# Patient Record
Sex: FEMALE | Race: BLACK OR AFRICAN AMERICAN | Employment: FULL TIME | URBAN - METROPOLITAN AREA
[De-identification: names, ages, dates, MRNs, and addresses within clinical notes are randomized per-mention and may not be internally consistent; named-entity substitution may affect disease eponyms.]

---

## 2017-05-05 ENCOUNTER — APPOINTMENT (OUTPATIENT)
Dept: GENERAL RADIOLOGY | Age: 57
End: 2017-05-05
Attending: EMERGENCY MEDICINE
Payer: SELF-PAY

## 2017-05-05 ENCOUNTER — HOSPITAL ENCOUNTER (EMERGENCY)
Age: 57
Discharge: HOME | End: 2017-05-05
Attending: EMERGENCY MEDICINE
Payer: SELF-PAY

## 2017-05-05 VITALS
BODY MASS INDEX: 26.68 KG/M2 | RESPIRATION RATE: 18 BRPM | TEMPERATURE: 97.9 F | SYSTOLIC BLOOD PRESSURE: 202 MMHG | HEART RATE: 88 BPM | WEIGHT: 170 LBS | OXYGEN SATURATION: 98 % | HEIGHT: 67 IN | DIASTOLIC BLOOD PRESSURE: 86 MMHG

## 2017-05-05 DIAGNOSIS — R60.0 BILATERAL LEG EDEMA: Primary | ICD-10-CM

## 2017-05-05 LAB
ALBUMIN SERPL BCP-MCNC: 1.9 G/DL (ref 3.5–5)
ALBUMIN/GLOB SERPL: 0.3 {RATIO} (ref 1.2–3.5)
ALP SERPL-CCNC: 231 U/L (ref 50–136)
ALT SERPL-CCNC: 55 U/L (ref 12–65)
ANION GAP BLD CALC-SCNC: 12 MMOL/L (ref 7–16)
AST SERPL W P-5'-P-CCNC: 110 U/L (ref 15–37)
BACTERIA URNS QL MICRO: ABNORMAL /HPF
BASOPHILS # BLD AUTO: 0 K/UL (ref 0–0.2)
BASOPHILS # BLD: 1 % (ref 0–2)
BILIRUB SERPL-MCNC: 1.5 MG/DL (ref 0.2–1.1)
BNP SERPL-MCNC: 270 PG/ML
BUN SERPL-MCNC: 14 MG/DL (ref 6–23)
CALCIUM SERPL-MCNC: 8.7 MG/DL (ref 8.3–10.4)
CASTS URNS QL MICRO: 0 /LPF
CHLORIDE SERPL-SCNC: 112 MMOL/L (ref 98–107)
CO2 SERPL-SCNC: 22 MMOL/L (ref 21–32)
CREAT SERPL-MCNC: 1.05 MG/DL (ref 0.6–1)
CRYSTALS URNS QL MICRO: 0 /LPF
DIFFERENTIAL METHOD BLD: ABNORMAL
EOSINOPHIL # BLD: 0.1 K/UL (ref 0–0.8)
EOSINOPHIL NFR BLD: 1 % (ref 0.5–7.8)
EPI CELLS #/AREA URNS HPF: ABNORMAL /HPF
ERYTHROCYTE [DISTWIDTH] IN BLOOD BY AUTOMATED COUNT: 16 % (ref 11.9–14.6)
GLOBULIN SER CALC-MCNC: 6.7 G/DL (ref 2.3–3.5)
GLUCOSE SERPL-MCNC: 110 MG/DL (ref 65–100)
HCT VFR BLD AUTO: 32.3 % (ref 35.8–46.3)
HGB BLD-MCNC: 11.2 G/DL (ref 11.7–15.4)
IMM GRANULOCYTES # BLD: 0 K/UL (ref 0–0.5)
IMM GRANULOCYTES NFR BLD AUTO: 0 % (ref 0–5)
LYMPHOCYTES # BLD AUTO: 42 % (ref 13–44)
LYMPHOCYTES # BLD: 1.8 K/UL (ref 0.5–4.6)
MCH RBC QN AUTO: 30.6 PG (ref 26.1–32.9)
MCHC RBC AUTO-ENTMCNC: 34.7 G/DL (ref 31.4–35)
MCV RBC AUTO: 88.3 FL (ref 79.6–97.8)
MONOCYTES # BLD: 0.4 K/UL (ref 0.1–1.3)
MONOCYTES NFR BLD AUTO: 9 % (ref 4–12)
MUCOUS THREADS URNS QL MICRO: 0 /LPF
NEUTS SEG # BLD: 1.9 K/UL (ref 1.7–8.2)
NEUTS SEG NFR BLD AUTO: 47 % (ref 43–78)
OTHER OBSERVATIONS,UCOM: ABNORMAL
PLATELET # BLD AUTO: 100 K/UL (ref 150–450)
PMV BLD AUTO: 11.7 FL (ref 10.8–14.1)
POTASSIUM SERPL-SCNC: 3.6 MMOL/L (ref 3.5–5.1)
PROT SERPL-MCNC: 8.6 G/DL (ref 6.3–8.2)
RBC # BLD AUTO: 3.66 M/UL (ref 4.05–5.25)
RBC #/AREA URNS HPF: ABNORMAL /HPF
SODIUM SERPL-SCNC: 146 MMOL/L (ref 136–145)
WBC # BLD AUTO: 4.2 K/UL (ref 4.3–11.1)
WBC URNS QL MICRO: ABNORMAL /HPF

## 2017-05-05 PROCEDURE — 81003 URINALYSIS AUTO W/O SCOPE: CPT | Performed by: EMERGENCY MEDICINE

## 2017-05-05 PROCEDURE — 85025 COMPLETE CBC W/AUTO DIFF WBC: CPT | Performed by: EMERGENCY MEDICINE

## 2017-05-05 PROCEDURE — 99284 EMERGENCY DEPT VISIT MOD MDM: CPT | Performed by: EMERGENCY MEDICINE

## 2017-05-05 PROCEDURE — 80053 COMPREHEN METABOLIC PANEL: CPT | Performed by: EMERGENCY MEDICINE

## 2017-05-05 PROCEDURE — 83880 ASSAY OF NATRIURETIC PEPTIDE: CPT | Performed by: EMERGENCY MEDICINE

## 2017-05-05 PROCEDURE — 81015 MICROSCOPIC EXAM OF URINE: CPT | Performed by: EMERGENCY MEDICINE

## 2017-05-05 PROCEDURE — 71020 XR CHEST PA LAT: CPT

## 2017-05-05 RX ORDER — FUROSEMIDE 20 MG/1
20 TABLET ORAL DAILY
Qty: 14 TAB | Refills: 0 | Status: SHIPPED | OUTPATIENT
Start: 2017-05-05 | End: 2017-05-19

## 2017-05-05 RX ORDER — SPIRONOLACTONE 25 MG/1
25 TABLET ORAL DAILY
Qty: 14 TAB | Refills: 0 | Status: SHIPPED | OUTPATIENT
Start: 2017-05-05 | End: 2017-05-19

## 2017-05-05 NOTE — ED PROVIDER NOTES
HPI Comments: 54-year-old lady with a history of bilateral leg swelling that goes up to her abdomen. The patient says as far she knows she has never had this kind of problem before. Patient says that both legs are sore but she's had no redness in them. She denies any fevers or chills. The patient says that the symptoms have progressed over the last several months. She says she has not seen any other  doctors about it. Patient denies any chest pain but says that when she does get up and walk around she gets winded easily. She denies any injuries. Elements of this note were created using speech recognition software. As such, errors of speech recognition may be present. Patient is a 64 y.o. female presenting with ankle swelling. The history is provided by the patient. Ankle swelling           Past Medical History:   Diagnosis Date    Hypertension     Infectious disease     hep c       Past Surgical History:   Procedure Laterality Date    HX ORTHOPAEDIC      tj in right leg         Family History:   Problem Relation Age of Onset    Lung Disease Mother        Social History     Social History    Marital status: SINGLE     Spouse name: N/A    Number of children: N/A    Years of education: N/A     Occupational History    Not on file. Social History Main Topics    Smoking status: Current Every Day Smoker     Packs/day: 2.00     Years: 19.00    Smokeless tobacco: Current User    Alcohol use Yes      Comment: daily- 3 beers daily    Drug use: No    Sexual activity: Not on file     Other Topics Concern    Not on file     Social History Narrative         ALLERGIES: Review of patient's allergies indicates no known allergies. Review of Systems   Constitutional: Negative for chills, diaphoresis and fever. HENT: Negative for congestion, rhinorrhea and sore throat. Eyes: Negative for redness and visual disturbance. Respiratory: Positive for shortness of breath.  Negative for cough, chest tightness and wheezing. Cardiovascular: Positive for leg swelling. Negative for chest pain and palpitations. Gastrointestinal: Negative for abdominal pain, blood in stool, diarrhea, nausea and vomiting. Endocrine: Negative for polydipsia and polyuria. Genitourinary: Negative for dysuria and hematuria. Musculoskeletal: Positive for arthralgias, joint swelling and myalgias. Negative for neck stiffness. Skin: Negative for rash. Allergic/Immunologic: Negative for environmental allergies and food allergies. Neurological: Negative for dizziness, weakness and headaches. Hematological: Negative for adenopathy. Does not bruise/bleed easily. Psychiatric/Behavioral: Negative for confusion and sleep disturbance. The patient is not nervous/anxious. Vitals:    05/05/17 1230 05/05/17 1232   BP:  (!) 206/115   Pulse: 86    Resp: 18    Temp: 97.7 °F (36.5 °C)    SpO2: 98%    Weight: 77.1 kg (170 lb)    Height: 5' 7\" (1.702 m)             Physical Exam   Constitutional: She is oriented to person, place, and time. She appears well-developed and well-nourished. HENT:   Head: Normocephalic and atraumatic. Eyes: Conjunctivae and EOM are normal. Pupils are equal, round, and reactive to light. Neck: Normal range of motion. Cardiovascular: Normal rate and regular rhythm. Pulmonary/Chest: Effort normal and breath sounds normal. No respiratory distress. She has no wheezes. She has no rales. She exhibits no tenderness. Abdominal: Soft. Bowel sounds are normal. There is no rebound and no guarding. Musculoskeletal: Normal range of motion. She exhibits edema. She exhibits no tenderness. 4+ edema up to her umbilicus   Lymphadenopathy:     She has no cervical adenopathy. Neurological: She is alert and oriented to person, place, and time. Skin: Skin is warm and dry. Psychiatric: She has a normal mood and affect. Nursing note and vitals reviewed.        MDM  Number of Diagnoses or Management Options  Bilateral leg edema:   Diagnosis management comments: I'll check a BNP as well as her kidney function and electrolytes function. I will also check her liver function. I will get an x-ray to look for any evidence of pulmonary edema. Given the slow and gradual nature of her symptoms and the lack of chest pain I do not think she has a PE. After reviewing the patient's chart from the [address] she has a fairly extensive history of cirrhosis and liver disease leading to significant ascites. Patient says that she has been out of all of the medicines that were prescribed to her so she has not been taking anything. Pending her test results I may be able to discharge her home on some of her previous medicines. Patient's BNP is mildly elevated but her other labs are not significantly different from her baseline. Therefore, I will re-prescribe her previous medicines and plan to discharge her home. Patient's urine appears to be contaminated and I do not think it represents an infection.     ED Course       Procedures

## 2017-05-05 NOTE — ED TRIAGE NOTES
Per patient bilateral leg swelling 2 weeks ago, and abd distention, patient states sob upon ambulation. Patient denies hx of chf, patient states that she is itching throughout her entire body for 1 year.

## 2017-05-05 NOTE — DISCHARGE INSTRUCTIONS
Return with any difficulty breathing, chest pain, worsening symptoms, or additional concerns. As we discussed, it is very important for you to follow-up with your primary care doctor for further evaluation in the next 3-5 days.

## 2021-10-22 ENCOUNTER — TRANSCRIBE ORDER (OUTPATIENT)
Dept: SCHEDULING | Age: 61
End: 2021-10-22

## 2021-10-22 DIAGNOSIS — Z12.31 ENCOUNTER FOR SCREENING MAMMOGRAM FOR MALIGNANT NEOPLASM OF BREAST: Primary | ICD-10-CM

## 2021-11-02 ENCOUNTER — HOSPITAL ENCOUNTER (EMERGENCY)
Age: 61
Discharge: HOME | End: 2021-11-02
Attending: EMERGENCY MEDICINE
Payer: MEDICAID

## 2021-11-02 ENCOUNTER — APPOINTMENT (OUTPATIENT)
Dept: GENERAL RADIOLOGY | Age: 61
End: 2021-11-02
Attending: PHYSICIAN ASSISTANT
Payer: MEDICAID

## 2021-11-02 VITALS
SYSTOLIC BLOOD PRESSURE: 148 MMHG | TEMPERATURE: 98 F | RESPIRATION RATE: 16 BRPM | HEIGHT: 67 IN | OXYGEN SATURATION: 99 % | BODY MASS INDEX: 25.11 KG/M2 | WEIGHT: 160 LBS | DIASTOLIC BLOOD PRESSURE: 80 MMHG | HEART RATE: 76 BPM

## 2021-11-02 DIAGNOSIS — S63.501A SPRAIN OF RIGHT WRIST, INITIAL ENCOUNTER: Primary | ICD-10-CM

## 2021-11-02 PROCEDURE — 74011250636 HC RX REV CODE- 250/636: Performed by: EMERGENCY MEDICINE

## 2021-11-02 PROCEDURE — 90471 IMMUNIZATION ADMIN: CPT

## 2021-11-02 PROCEDURE — 90715 TDAP VACCINE 7 YRS/> IM: CPT | Performed by: EMERGENCY MEDICINE

## 2021-11-02 PROCEDURE — 73110 X-RAY EXAM OF WRIST: CPT

## 2021-11-02 PROCEDURE — 74011250637 HC RX REV CODE- 250/637: Performed by: PHYSICIAN ASSISTANT

## 2021-11-02 PROCEDURE — 99283 EMERGENCY DEPT VISIT LOW MDM: CPT

## 2021-11-02 RX ORDER — ONDANSETRON 4 MG/1
4 TABLET, ORALLY DISINTEGRATING ORAL
Status: COMPLETED | OUTPATIENT
Start: 2021-11-02 | End: 2021-11-02

## 2021-11-02 RX ORDER — IBUPROFEN 800 MG/1
800 TABLET ORAL
Qty: 30 TABLET | Refills: 0 | Status: SHIPPED | OUTPATIENT
Start: 2021-11-02 | End: 2021-11-12

## 2021-11-02 RX ORDER — HYDROCODONE BITARTRATE AND ACETAMINOPHEN 5; 325 MG/1; MG/1
1 TABLET ORAL
Status: COMPLETED | OUTPATIENT
Start: 2021-11-02 | End: 2021-11-02

## 2021-11-02 RX ADMIN — ONDANSETRON 4 MG: 4 TABLET, ORALLY DISINTEGRATING ORAL at 17:28

## 2021-11-02 RX ADMIN — HYDROCODONE BITARTRATE AND ACETAMINOPHEN 1 TABLET: 5; 325 TABLET ORAL at 17:28

## 2021-11-02 RX ADMIN — TETANUS TOXOID, REDUCED DIPHTHERIA TOXOID AND ACELLULAR PERTUSSIS VACCINE, ADSORBED 0.5 ML: 5; 2.5; 8; 8; 2.5 SUSPENSION INTRAMUSCULAR at 17:28

## 2021-11-02 NOTE — ED NOTES
I have reviewed discharge instructions with the patient. The patient verbalized understanding. Patient left ED via Discharge Method: ambulatory to Home with family      Opportunity for questions and clarification provided. Patient given 1 scripts. To continue your aftercare when you leave the hospital, you may receive an automated call from our care team to check in on how you are doing. This is a free service and part of our promise to provide the best care and service to meet your aftercare needs.  If you have questions, or wish to unsubscribe from this service please call [phone]. Thank you for Choosing our [facility] Emergency Department.

## 2021-11-02 NOTE — ED PROVIDER NOTES
Patient states she fell this morning taking her trash. Landed on the right wrist and right knee. Continued pain and swelling to the right wrist, she is right-hand dominant no relief with over-the-counter meds and ice she brought here by family member she denies any head injury    The history is provided by the patient. Fall  The accident occurred 6 to 12 hours ago. The fall occurred while walking. She fell from a height of ground level. She landed on grass. There was no blood loss. The point of impact was the right wrist and right knee. The pain is present in the right wrist. The pain is at a severity of 10/10. The pain is moderate. She was ambulatory at the scene. There was no entrapment after the fall. There was no drug use involved in the accident. There was no alcohol use involved in the accident. Pertinent negatives include no loss of consciousness. The risk factors include being elderly. Associated symptoms comments: Abrasion rt knee. The symptoms are aggravated by pressure on injury. She has tried ice and acetaminophen for the symptoms. The treatment provided mild relief. It is unknown when the patient last had a tetanus shot.         Past Medical History:   Diagnosis Date    Hypertension     Infectious disease     hep c       Past Surgical History:   Procedure Laterality Date    HX ORTHOPAEDIC      tj in right leg         Family History:   Problem Relation Age of Onset    Lung Disease Mother        Social History     Socioeconomic History    Marital status: SINGLE     Spouse name: Not on file    Number of children: Not on file    Years of education: Not on file    Highest education level: Not on file   Occupational History    Not on file   Tobacco Use    Smoking status: Current Every Day Smoker     Packs/day: 2.00     Years: 19.00     Pack years: 38.00    Smokeless tobacco: Current User   Substance and Sexual Activity    Alcohol use: Yes     Comment: daily- 3 beers daily    Drug use: No    Sexual activity: Not on file   Other Topics Concern    Not on file   Social History Narrative    Not on file     Social Determinants of Health     Financial Resource Strain:     Difficulty of Paying Living Expenses:    Food Insecurity:     Worried About Running Out of Food in the Last Year:     [address] in the Last Year:    Transportation Needs:     Lack of Transportation (Medical):  Lack of Transportation (Non-Medical):    Physical Activity:     Days of Exercise per Week:     Minutes of Exercise per Session:    Stress:     Feeling of Stress :    Social Connections:     Frequency of Communication with Friends and Family:     Frequency of Social Gatherings with Friends and Family:     Attends Zoroastrian Services:     Active Member of Clubs or Organizations:     Attends Club or Organization Meetings:     Marital Status:    Intimate Partner Violence:     Fear of Current or Ex-Partner:     Emotionally Abused:     Physically Abused:     Sexually Abused: ALLERGIES: Patient has no known allergies. Review of Systems   Neurological: Negative for loss of consciousness. All other systems reviewed and are negative. Vitals:    11/02/21 1637   BP: (!) 151/80   Pulse: 79   Resp: 18   Temp: 97.9 °F (36.6 °C)   SpO2: 99%   Weight: 72.6 kg (160 lb)   Height: 5' 7\" (1.702 m)            Physical Exam  Vitals and nursing note reviewed. Constitutional:       General: She is not in acute distress. Appearance: Normal appearance. She is well-developed. She is not diaphoretic. HENT:      Head: Normocephalic and atraumatic. Right Ear: External ear normal.      Left Ear: External ear normal.      Nose: Nose normal.      Mouth/Throat:      Mouth: Mucous membranes are moist.   Eyes:      Pupils: Pupils are equal, round, and reactive to light. Cardiovascular:      Rate and Rhythm: Normal rate and regular rhythm.    Pulmonary:      Effort: Pulmonary effort is normal.      Breath sounds: Normal breath sounds. Abdominal:      General: Abdomen is flat. Bowel sounds are normal.      Palpations: Abdomen is soft. Musculoskeletal:         General: Swelling and tenderness present. Normal range of motion. Cervical back: Normal range of motion and neck supple. Comments: Right wrist with dorsal swelling tenderness to palpation limited range of motion due to pain. No abrasions noted. No pain to the elbow or left shoulder. Right knee with slight abrasion, no swelling full range of motion, she is status post remote total knee arthroplasty   Skin:     General: Skin is warm. Neurological:      General: No focal deficit present. Mental Status: She is alert and oriented to person, place, and time. Psychiatric:         Mood and Affect: Mood normal.         Behavior: Behavior normal.          MDM  Number of Diagnoses or Management Options  Diagnosis management comments: XR WRIST RT AP/LAT/OBL MIN 3V   Final Result    Negative right wrist      Right wrist sprain and knee abrasion.   Patient placed in Velcro wrist splint given prescription for motrin see primary care for routine recheck       Amount and/or Complexity of Data Reviewed  Tests in the radiology section of CPT®: ordered and reviewed  Review and summarize past medical records: yes    Risk of Complications, Morbidity, and/or Mortality  Presenting problems: moderate  Diagnostic procedures: moderate  Management options: low    Patient Progress  Patient progress: improved         Procedures

## 2021-11-02 NOTE — DISCHARGE INSTRUCTIONS
Use med sas directed, ice 2-3 times per day,wear splint for comfort, see your primary md for recheck.  Keep abrasion clean

## 2022-01-13 ENCOUNTER — TRANSCRIBE ORDER (OUTPATIENT)
Dept: SCHEDULING | Age: 62
End: 2022-01-13

## 2022-01-13 DIAGNOSIS — Z12.39 ENCOUNTER FOR OTHER SCREENING FOR MALIGNANT NEOPLASM OF BREAST: Primary | ICD-10-CM

## 2022-09-23 ENCOUNTER — HOSPITAL ENCOUNTER (EMERGENCY)
Age: 62
Discharge: HOME | End: 2022-09-24
Attending: EMERGENCY MEDICINE
Payer: MEDICAID

## 2022-09-23 DIAGNOSIS — R21 RASH AND OTHER NONSPECIFIC SKIN ERUPTION: ICD-10-CM

## 2022-09-23 DIAGNOSIS — E80.6 HYPERBILIRUBINEMIA: Primary | ICD-10-CM

## 2022-09-23 DIAGNOSIS — K59.00 CONSTIPATION, UNSPECIFIED CONSTIPATION TYPE: ICD-10-CM

## 2022-09-23 PROCEDURE — 99284 EMERGENCY DEPT VISIT MOD MDM: CPT

## 2022-09-23 ASSESSMENT — PAIN - FUNCTIONAL ASSESSMENT: PAIN_FUNCTIONAL_ASSESSMENT: NONE - DENIES PAIN

## 2022-09-24 ENCOUNTER — APPOINTMENT (OUTPATIENT)
Dept: GENERAL RADIOLOGY | Age: 62
End: 2022-09-24
Payer: MEDICAID

## 2022-09-24 ENCOUNTER — TELEPHONE (OUTPATIENT)
Dept: EMERGENCY DEPT | Age: 62
End: 2022-09-24

## 2022-09-24 VITALS
DIASTOLIC BLOOD PRESSURE: 92 MMHG | OXYGEN SATURATION: 97 % | SYSTOLIC BLOOD PRESSURE: 186 MMHG | BODY MASS INDEX: 24.64 KG/M2 | TEMPERATURE: 98 F | HEART RATE: 59 BPM | RESPIRATION RATE: 18 BRPM | HEIGHT: 67 IN | WEIGHT: 157 LBS

## 2022-09-24 LAB
ALBUMIN SERPL-MCNC: 2.3 G/DL (ref 3.2–4.6)
ALBUMIN/GLOB SERPL: 0.5 {RATIO} (ref 1.2–3.5)
ALP SERPL-CCNC: 165 U/L (ref 50–136)
ALT SERPL-CCNC: 18 U/L (ref 12–65)
ANION GAP SERPL CALC-SCNC: 5 MMOL/L (ref 4–13)
AST SERPL-CCNC: 48 U/L (ref 15–37)
BILIRUB SERPL-MCNC: 2.3 MG/DL (ref 0.2–1.1)
BUN SERPL-MCNC: 15 MG/DL (ref 8–23)
CALCIUM SERPL-MCNC: 8.2 MG/DL (ref 8.3–10.4)
CHLORIDE SERPL-SCNC: 105 MMOL/L (ref 101–110)
CO2 SERPL-SCNC: 27 MMOL/L (ref 21–32)
CREAT SERPL-MCNC: 1.2 MG/DL (ref 0.6–1)
ERYTHROCYTE [DISTWIDTH] IN BLOOD BY AUTOMATED COUNT: 13.2 % (ref 11.9–14.6)
GLOBULIN SER CALC-MCNC: 4.9 G/DL (ref 2.3–3.5)
GLUCOSE SERPL-MCNC: 79 MG/DL (ref 65–100)
HAV IGM SER QL: NONREACTIVE
HBV CORE IGM SER QL: NONREACTIVE
HBV SURFACE AG SER QL: NONREACTIVE
HCT VFR BLD AUTO: 24.9 % (ref 35.8–46.3)
HCV AB SER QL: REACTIVE
HGB BLD-MCNC: 8.5 G/DL (ref 11.7–15.4)
MCH RBC QN AUTO: 34.7 PG (ref 26.1–32.9)
MCHC RBC AUTO-ENTMCNC: 34.1 G/DL (ref 31.4–35)
MCV RBC AUTO: 101.6 FL (ref 79.6–97.8)
NRBC # BLD: 0 K/UL (ref 0–0.2)
PLATELET # BLD AUTO: 74 K/UL (ref 150–450)
PMV BLD AUTO: 12.2 FL (ref 9.4–12.3)
POTASSIUM SERPL-SCNC: 2.6 MMOL/L (ref 3.5–5.1)
PROT SERPL-MCNC: 7.2 G/DL (ref 6.3–8.2)
RBC # BLD AUTO: 2.45 M/UL (ref 4.05–5.2)
SODIUM SERPL-SCNC: 137 MMOL/L (ref 136–145)
WBC # BLD AUTO: 5.8 K/UL (ref 4.3–11.1)

## 2022-09-24 PROCEDURE — 6370000000 HC RX 637 (ALT 250 FOR IP)

## 2022-09-24 PROCEDURE — 80074 ACUTE HEPATITIS PANEL: CPT

## 2022-09-24 PROCEDURE — 74022 RADEX COMPL AQT ABD SERIES: CPT

## 2022-09-24 PROCEDURE — 80053 COMPREHEN METABOLIC PANEL: CPT

## 2022-09-24 PROCEDURE — 6370000000 HC RX 637 (ALT 250 FOR IP): Performed by: EMERGENCY MEDICINE

## 2022-09-24 PROCEDURE — 85027 COMPLETE CBC AUTOMATED: CPT

## 2022-09-24 RX ORDER — POTASSIUM CHLORIDE 20 MEQ/1
40 TABLET, EXTENDED RELEASE ORAL ONCE
Status: COMPLETED | OUTPATIENT
Start: 2022-09-24 | End: 2022-09-24

## 2022-09-24 RX ORDER — PREDNISONE 50 MG/1
TABLET ORAL
Status: DISCONTINUED
Start: 2022-09-24 | End: 2022-09-24 | Stop reason: HOSPADM

## 2022-09-24 RX ORDER — HYDROXYZINE HYDROCHLORIDE 25 MG/1
TABLET, FILM COATED ORAL
Status: DISCONTINUED
Start: 2022-09-24 | End: 2022-09-24 | Stop reason: HOSPADM

## 2022-09-24 RX ORDER — POTASSIUM CHLORIDE 20 MEQ/1
TABLET, EXTENDED RELEASE ORAL
Status: COMPLETED
Start: 2022-09-24 | End: 2022-09-24

## 2022-09-24 RX ORDER — PREDNISONE 10 MG/1
TABLET ORAL
Status: DISCONTINUED
Start: 2022-09-24 | End: 2022-09-24 | Stop reason: HOSPADM

## 2022-09-24 RX ORDER — HYDROXYZINE HYDROCHLORIDE 25 MG/1
TABLET, FILM COATED ORAL
Status: DISCONTINUED
Start: 2022-09-24 | End: 2022-09-24 | Stop reason: WASHOUT

## 2022-09-24 RX ORDER — HYDROXYZINE HYDROCHLORIDE 25 MG/1
50 TABLET, FILM COATED ORAL
Status: COMPLETED | OUTPATIENT
Start: 2022-09-24 | End: 2022-09-24

## 2022-09-24 RX ADMIN — POTASSIUM CHLORIDE 40 MEQ: 20 TABLET, EXTENDED RELEASE ORAL at 05:20

## 2022-09-24 RX ADMIN — HYDROXYZINE HYDROCHLORIDE 50 MG: 25 TABLET, FILM COATED ORAL at 05:19

## 2022-09-24 RX ADMIN — PREDNISONE 60 MG: 10 TABLET ORAL at 05:21

## 2022-09-24 NOTE — ED NOTES
I have reviewed discharge instructions with the patient. The patient verbalized understanding. Patient left ED via Discharge Method: wheelchair to Home with . Opportunity for questions and clarification provided. Patient given 4 scripts. To continue your aftercare when you leave the hospital, you may receive an automated call from our care team to check in on how you are doing. This is a free service and part of our promise to provide the best care and service to meet your aftercare needs.  If you have questions, or wish to unsubscribe from this service please call [phone]. Thank you for Choosing our [facility] Emergency Department.        Li Kim RN  09/24/22 6059

## 2022-09-24 NOTE — ED TRIAGE NOTES
Pt arrives via POV coming from home c/o having something under her skin \"all over\". Pt states \"I have something under my skin no matter what anyone says\". Pt states she is constipated. Pt had a small bowel movement in the waiting room of the ER. No other complaints at time of triage.

## 2022-09-30 ASSESSMENT — ENCOUNTER SYMPTOMS
NAUSEA: 0
CONSTIPATION: 1
EYE DISCHARGE: 0
VOMITING: 0
BACK PAIN: 0
COLOR CHANGE: 1
ABDOMINAL PAIN: 0
RHINORRHEA: 0
SHORTNESS OF BREATH: 0
COUGH: 0
FACIAL SWELLING: 0
EYE REDNESS: 0

## 2022-09-30 NOTE — ED PROVIDER NOTES
[facility] Provider Note                   PCP:                Fabienne Gutierrez. NYA De Guzman NP               Age: 58 y.o. Sex: female       ICD-10-CM    1. Hyperbilirubinemia  E80.6       2. Constipation, unspecified constipation type  K59.00       3. Rash and other nonspecific skin eruption  R21           DISPOSITION Decision To Discharge 09/24/2022 05:18:40 AM       Discharge Medication List as of 9/24/2022  5:22 AM          Orders Placed This Encounter   Procedures    XR ACUTE ABD SERIES CHEST 1 VW    CBC    Comprehensive Metabolic Panel    Hepatitis Panel, Acute         Nahid Roger is a 58 y.o. female who presents to the Emergency Department with chief complaint of    Chief Complaint   Patient presents with    Skin Problem    Constipation      Chief complaint : Rash and constipation    HISTORY OF PRESENT ILLNESS :  Location : Diffuse rash,    Quality : Pruritic: Slight jaundice/icterus    Quantity : Constant    Timing : 2 weeks    Severity : Mild    Context : BMs are usually pretty regular    Alleviating / exacerbating factors : No remedies attempted    Associated Symptoms : No nausea or vomiting    -------------------------------    SOCIAL HISTORY : Single, smoker, drinks 3 beers a day        Review of Systems   Constitutional:  Negative for chills and fever. HENT:  Negative for facial swelling and rhinorrhea. Eyes:  Negative for discharge and redness. Respiratory:  Negative for cough and shortness of breath. Cardiovascular:  Negative for chest pain and palpitations. Gastrointestinal:  Positive for constipation. Negative for abdominal pain, nausea and vomiting. Endocrine: Negative for polydipsia and polyuria. Genitourinary:  Negative for difficulty urinating and dysuria. Musculoskeletal:  Negative for arthralgias and back pain. Skin:  Positive for color change. Negative for pallor. Neurological:  Negative for dizziness and headaches.    All other systems reviewed and are negative. All other systems reviewed and are negative. Past Medical History:   Diagnosis Date    Hypertension     Infectious disease     hep c        Past Surgical History:   Procedure Laterality Date    ORTHOPEDIC SURGERY      evie in right leg        Family History   Problem Relation Age of Onset    Lung Disease Mother         Social Connections: Not on file        No Known Allergies     Vitals signs and nursing note reviewed. No data found. Physical Exam  Vitals and nursing note reviewed. Constitutional:       General: She is not in acute distress. Appearance: Normal appearance. She is obese. She is not ill-appearing, toxic-appearing or diaphoretic. HENT:      Head: Normocephalic and atraumatic. Right Ear: External ear normal.      Left Ear: External ear normal.      Nose: No rhinorrhea. Mouth/Throat:      Mouth: Mucous membranes are moist.      Pharynx: Oropharynx is clear. No oropharyngeal exudate or posterior oropharyngeal erythema. Eyes:      General: Scleral icterus present. Right eye: No discharge. Left eye: No discharge. Extraocular Movements: Extraocular movements intact. Conjunctiva/sclera: Conjunctivae normal.   Cardiovascular:      Rate and Rhythm: Normal rate and regular rhythm. Pulmonary:      Effort: Pulmonary effort is normal. No respiratory distress. Breath sounds: Normal breath sounds. Abdominal:      General: Abdomen is flat. Palpations: Abdomen is soft. There is no fluid wave or pulsatile mass. Tenderness: There is no abdominal tenderness. There is no guarding or rebound. Genitourinary:     Rectum: Normal.   Musculoskeletal:         General: No deformity or signs of injury. Normal range of motion. Cervical back: Normal range of motion and neck supple. No rigidity. Skin:     General: Skin is warm and dry. Coloration: Skin is not jaundiced or pale.    Neurological:      General: No focal deficit present. Mental Status: She is alert and oriented to person, place, and time. Mental status is at baseline. Psychiatric:         Mood and Affect: Mood normal.         Behavior: Behavior normal.         Thought Content:  Thought content normal.        MDM  Number of Diagnoses or Management Options  Constipation, unspecified constipation type  Hyperbilirubinemia  Rash and other nonspecific skin eruption  Diagnosis management comments: Constipation, no fecal impaction, recommend MiraLAX washout and some Colace  Elevated bilirubin, will have her follow-up with GI,       Amount and/or Complexity of Data Reviewed  Clinical lab tests: ordered and reviewed  Tests in the radiology section of CPT®: ordered and reviewed  Decide to obtain previous medical records or to obtain history from someone other than the patient: yes  Obtain history from someone other than the patient: yes (Family member at bedside)  Discuss the patient with other providers: yes (Gi on-call)    Risk of Complications, Morbidity, and/or Mortality  Presenting problems: moderate  Diagnostic procedures: low  Management options: low  General comments: Patient was seen during a computer downtime, chart reconstructed from memory    Patient Progress  Patient progress: improved      Procedures    Labs Reviewed   CBC - Abnormal; Notable for the following components:       Result Value    RBC 2.45 (*)     Hemoglobin 8.5 (*)     Hematocrit 24.9 (*)     .6 (*)     MCH 34.7 (*)     Platelets 74 (*)     All other components within normal limits   COMPREHENSIVE METABOLIC PANEL - Abnormal; Notable for the following components:    Potassium 2.6 (*)     Creatinine 1.20 (*)     GFR  59 (*)     GFR Non-African American 48 (*)     Calcium 8.2 (*)     Total Bilirubin 2.3 (*)     AST 48 (*)     Alk Phosphatase 165 (*)     Albumin 2.3 (*)     Globulin 4.9 (*)     Albumin/Globulin Ratio 0.5 (*)     All other components within normal limits   HEPATITIS PANEL, ACUTE - Abnormal; Notable for the following components:    Hepatitis C Ab REACTIVE (*)     All other components within normal limits        XR ACUTE ABD SERIES CHEST 1 VW   Final Result      1. Cholelithiasis. 2. Normal/nonobstructive bowel gas pattern. 3. No acute pulmonary disease. [place] Coma Scale  Eye Opening: Spontaneous  Best Verbal Response: Oriented  Best Motor Response: Obeys commands  [place] Coma Scale Score: 15                     Voice dictation software was used during the making of this note. This software is not perfect and grammatical and other typographical errors may be present. This note has not been completely proofread for errors.        Mario West MD  09/30/22 1540

## 2022-10-26 ENCOUNTER — HOSPITAL ENCOUNTER (INPATIENT)
Age: 62
LOS: 4 days | Discharge: HOME HEALTH | DRG: 384 | End: 2022-10-31
Attending: EMERGENCY MEDICINE | Admitting: HOSPITALIST
Payer: MEDICAID

## 2022-10-26 DIAGNOSIS — D69.6 THROMBOCYTOPENIA, UNSPECIFIED (HCC): ICD-10-CM

## 2022-10-26 DIAGNOSIS — I16.1 HYPERTENSIVE EMERGENCY: Primary | ICD-10-CM

## 2022-10-26 DIAGNOSIS — N28.9 RENAL INSUFFICIENCY: ICD-10-CM

## 2022-10-26 DIAGNOSIS — I24.8 DEMAND ISCHEMIA (HCC): ICD-10-CM

## 2022-10-26 LAB
ALBUMIN SERPL-MCNC: 2.8 G/DL (ref 3.2–4.6)
ALBUMIN/GLOB SERPL: 0.5 {RATIO} (ref 0.4–1.6)
ALP SERPL-CCNC: 81 U/L (ref 50–136)
ALT SERPL-CCNC: 14 U/L (ref 12–65)
ANION GAP SERPL CALC-SCNC: 8 MMOL/L (ref 2–11)
AST SERPL-CCNC: 26 U/L (ref 15–37)
BASOPHILS # BLD: 0 K/UL (ref 0–0.2)
BASOPHILS NFR BLD: 0 % (ref 0–2)
BILIRUB SERPL-MCNC: 1.5 MG/DL (ref 0.2–1.1)
BUN SERPL-MCNC: 10 MG/DL (ref 8–23)
CALCIUM SERPL-MCNC: 9.1 MG/DL (ref 8.3–10.4)
CHLORIDE SERPL-SCNC: 110 MMOL/L (ref 101–110)
CO2 SERPL-SCNC: 20 MMOL/L (ref 21–32)
CREAT SERPL-MCNC: 1.3 MG/DL (ref 0.6–1)
DIFFERENTIAL METHOD BLD: ABNORMAL
EOSINOPHIL # BLD: 0 K/UL (ref 0–0.8)
EOSINOPHIL NFR BLD: 0 % (ref 0.5–7.8)
ERYTHROCYTE [DISTWIDTH] IN BLOOD BY AUTOMATED COUNT: 13.5 % (ref 11.9–14.6)
GLOBULIN SER CALC-MCNC: 5.5 G/DL (ref 2.8–4.5)
GLUCOSE SERPL-MCNC: 109 MG/DL (ref 65–100)
HCT VFR BLD AUTO: 26.5 % (ref 35.8–46.3)
HGB BLD-MCNC: 8.8 G/DL (ref 11.7–15.4)
IMM GRANULOCYTES # BLD AUTO: 0 K/UL (ref 0–0.5)
IMM GRANULOCYTES NFR BLD AUTO: 1 % (ref 0–5)
LIPASE SERPL-CCNC: 155 U/L (ref 73–393)
LYMPHOCYTES # BLD: 0.7 K/UL (ref 0.5–4.6)
LYMPHOCYTES NFR BLD: 14 % (ref 13–44)
MCH RBC QN AUTO: 31.7 PG (ref 26.1–32.9)
MCHC RBC AUTO-ENTMCNC: 33.2 G/DL (ref 31.4–35)
MCV RBC AUTO: 95.3 FL (ref 82–102)
MONOCYTES # BLD: 0.3 K/UL (ref 0.1–1.3)
MONOCYTES NFR BLD: 6 % (ref 4–12)
NEUTS SEG # BLD: 4 K/UL (ref 1.7–8.2)
NEUTS SEG NFR BLD: 79 % (ref 43–78)
NRBC # BLD: 0 K/UL (ref 0–0.2)
PLATELET # BLD AUTO: 161 K/UL (ref 150–450)
PMV BLD AUTO: 10.8 FL (ref 9.4–12.3)
POTASSIUM SERPL-SCNC: 3.6 MMOL/L (ref 3.5–5.1)
PROT SERPL-MCNC: 8.3 G/DL (ref 6.3–8.2)
RBC # BLD AUTO: 2.78 M/UL (ref 4.05–5.2)
SODIUM SERPL-SCNC: 138 MMOL/L (ref 133–143)
WBC # BLD AUTO: 5.1 K/UL (ref 4.3–11.1)

## 2022-10-26 PROCEDURE — 80053 COMPREHEN METABOLIC PANEL: CPT

## 2022-10-26 PROCEDURE — 99285 EMERGENCY DEPT VISIT HI MDM: CPT | Performed by: EMERGENCY MEDICINE

## 2022-10-26 PROCEDURE — 85025 COMPLETE CBC W/AUTO DIFF WBC: CPT

## 2022-10-26 PROCEDURE — 83690 ASSAY OF LIPASE: CPT

## 2022-10-26 PROCEDURE — 84484 ASSAY OF TROPONIN QUANT: CPT

## 2022-10-26 PROCEDURE — 93005 ELECTROCARDIOGRAM TRACING: CPT | Performed by: EMERGENCY MEDICINE

## 2022-10-26 ASSESSMENT — PAIN SCALES - GENERAL: PAINLEVEL_OUTOF10: 10

## 2022-10-26 ASSESSMENT — PAIN DESCRIPTION - LOCATION: LOCATION: ABDOMEN

## 2022-10-26 ASSESSMENT — PAIN - FUNCTIONAL ASSESSMENT: PAIN_FUNCTIONAL_ASSESSMENT: 0-10

## 2022-10-26 ASSESSMENT — PAIN DESCRIPTION - DESCRIPTORS: DESCRIPTORS: ACHING;SORE

## 2022-10-27 ENCOUNTER — APPOINTMENT (OUTPATIENT)
Dept: CT IMAGING | Age: 62
DRG: 384 | End: 2022-10-27
Payer: MEDICAID

## 2022-10-27 ENCOUNTER — APPOINTMENT (OUTPATIENT)
Dept: ULTRASOUND IMAGING | Age: 62
DRG: 384 | End: 2022-10-27
Payer: MEDICAID

## 2022-10-27 ENCOUNTER — APPOINTMENT (OUTPATIENT)
Dept: GENERAL RADIOLOGY | Age: 62
DRG: 384 | End: 2022-10-27
Payer: MEDICAID

## 2022-10-27 ENCOUNTER — APPOINTMENT (OUTPATIENT)
Dept: NON INVASIVE DIAGNOSTICS | Age: 62
DRG: 384 | End: 2022-10-27
Payer: MEDICAID

## 2022-10-27 ENCOUNTER — ANESTHESIA EVENT (OUTPATIENT)
Dept: ENDOSCOPY | Age: 62
DRG: 384 | End: 2022-10-27
Payer: MEDICAID

## 2022-10-27 PROBLEM — I16.1 HYPERTENSIVE EMERGENCY: Status: ACTIVE | Noted: 2022-10-27

## 2022-10-27 PROBLEM — R10.13: Status: ACTIVE | Noted: 2022-10-27

## 2022-10-27 PROBLEM — R18.8 ASCITES: Status: ACTIVE | Noted: 2022-10-27

## 2022-10-27 PROBLEM — I24.89 DEMAND ISCHEMIA: Status: ACTIVE | Noted: 2022-10-27

## 2022-10-27 PROBLEM — R94.31 QT PROLONGATION: Status: ACTIVE | Noted: 2022-10-27

## 2022-10-27 PROBLEM — I16.0 HYPERTENSIVE URGENCY: Status: ACTIVE | Noted: 2022-10-27

## 2022-10-27 PROBLEM — I24.8 DEMAND ISCHEMIA (HCC): Status: ACTIVE | Noted: 2022-10-27

## 2022-10-27 LAB
AMMONIA PLAS-SCNC: 46 UMOL/L (ref 11–32)
B PERT DNA SPEC QL NAA+PROBE: NOT DETECTED
BORDETELLA PARAPERTUSSIS BY PCR: NOT DETECTED
C PNEUM DNA SPEC QL NAA+PROBE: NOT DETECTED
ECHO AO ASC DIAM: 3.2 CM
ECHO AO ROOT DIAM: 3 CM
ECHO AR MAX VEL PISA: 3.7 M/S
ECHO AV AREA PEAK VELOCITY: 2 CM2
ECHO AV AREA VTI: 2.2 CM2
ECHO AV MEAN GRADIENT: 4 MMHG
ECHO AV MEAN VELOCITY: 1 M/S
ECHO AV PEAK GRADIENT: 9 MMHG
ECHO AV PEAK VELOCITY: 1.5 M/S
ECHO AV REGURGITANT PHT: 502 MS
ECHO AV VELOCITY RATIO: 0.8
ECHO AV VTI: 26.9 CM
ECHO IVC PROX: 1.5 CM
ECHO LA AREA 2C: 17.3 CM2
ECHO LA AREA 4C: 18.5 CM2
ECHO LA DIAMETER: 4 CM
ECHO LA MAJOR AXIS: 5.4 CM
ECHO LA MINOR AXIS: 5.9 CM
ECHO LA TO AORTIC ROOT RATIO: 1.33
ECHO LA VOL BP: 48 ML (ref 22–52)
ECHO LV E' LATERAL VELOCITY: 6 CM/S
ECHO LV E' SEPTAL VELOCITY: 5 CM/S
ECHO LV EDV A2C: 88 ML
ECHO LV EDV A4C: 98 ML
ECHO LV EJECTION FRACTION A2C: 60 %
ECHO LV EJECTION FRACTION A4C: 52 %
ECHO LV EJECTION FRACTION BIPLANE: 56 % (ref 55–100)
ECHO LV ESV A2C: 35 ML
ECHO LV ESV A4C: 47 ML
ECHO LV FRACTIONAL SHORTENING: 33 % (ref 28–44)
ECHO LV INTERNAL DIMENSION DIASTOLIC: 4.2 CM (ref 3.9–5.3)
ECHO LV INTERNAL DIMENSION SYSTOLIC: 2.8 CM
ECHO LV IVSD: 1.1 CM (ref 0.6–0.9)
ECHO LV MASS 2D: 157.1 G (ref 67–162)
ECHO LV POSTERIOR WALL DIASTOLIC: 1.1 CM (ref 0.6–0.9)
ECHO LV RELATIVE WALL THICKNESS RATIO: 0.52
ECHO LVOT AREA: 2.5 CM2
ECHO LVOT AV VTI INDEX: 0.87
ECHO LVOT DIAM: 1.8 CM
ECHO LVOT MEAN GRADIENT: 3 MMHG
ECHO LVOT PEAK GRADIENT: 6 MMHG
ECHO LVOT PEAK VELOCITY: 1.2 M/S
ECHO LVOT SV: 59.8 ML
ECHO LVOT VTI: 23.5 CM
ECHO MV A VELOCITY: 1.03 M/S
ECHO MV AREA VTI: 2 CM2
ECHO MV E DECELERATION TIME (DT): 246 MS
ECHO MV E VELOCITY: 0.79 M/S
ECHO MV E/A RATIO: 0.77
ECHO MV E/E' LATERAL: 13.17
ECHO MV E/E' RATIO (AVERAGED): 14.48
ECHO MV E/E' SEPTAL: 15.8
ECHO MV LVOT VTI INDEX: 1.3
ECHO MV MAX VELOCITY: 1.1 M/S
ECHO MV MEAN GRADIENT: 2 MMHG
ECHO MV MEAN VELOCITY: 0.6 M/S
ECHO MV PEAK GRADIENT: 4 MMHG
ECHO MV VTI: 30.5 CM
ECHO PV ACCELERATION TIME (AT): 141 MS
ECHO PV MAX VELOCITY: 0.9 M/S
ECHO PV PEAK GRADIENT: 3 MMHG
ECHO RV BASAL DIMENSION: 4 CM
ECHO RV INTERNAL DIMENSION: 3.5 CM
ECHO RV TAPSE: 2.5 CM (ref 1.7–?)
ECHO TV REGURGITANT MAX VELOCITY: 2.21 M/S
ECHO TV REGURGITANT PEAK GRADIENT: 20 MMHG
EKG ATRIAL RATE: 101 BPM
EKG DIAGNOSIS: NORMAL
EKG P AXIS: 46 DEGREES
EKG P-R INTERVAL: 152 MS
EKG Q-T INTERVAL: 426 MS
EKG QRS DURATION: 78 MS
EKG QTC CALCULATION (BAZETT): 552 MS
EKG R AXIS: 26 DEGREES
EKG T AXIS: 42 DEGREES
EKG VENTRICULAR RATE: 101 BPM
FLUAV SUBTYP SPEC NAA+PROBE: NOT DETECTED
FLUBV RNA SPEC QL NAA+PROBE: NOT DETECTED
HADV DNA SPEC QL NAA+PROBE: NOT DETECTED
HCOV 229E RNA SPEC QL NAA+PROBE: NOT DETECTED
HCOV HKU1 RNA SPEC QL NAA+PROBE: NOT DETECTED
HCOV NL63 RNA SPEC QL NAA+PROBE: NOT DETECTED
HCOV OC43 RNA SPEC QL NAA+PROBE: NOT DETECTED
HMPV RNA SPEC QL NAA+PROBE: NOT DETECTED
HPIV1 RNA SPEC QL NAA+PROBE: NOT DETECTED
HPIV2 RNA SPEC QL NAA+PROBE: NOT DETECTED
HPIV3 RNA SPEC QL NAA+PROBE: NOT DETECTED
HPIV4 RNA SPEC QL NAA+PROBE: NOT DETECTED
LV EF: 53 %
LVEF MODALITY: ABNORMAL
M PNEUMO DNA SPEC QL NAA+PROBE: NOT DETECTED
RSV RNA SPEC QL NAA+PROBE: NOT DETECTED
RV+EV RNA SPEC QL NAA+PROBE: DETECTED
SARS-COV-2 RNA RESP QL NAA+PROBE: NOT DETECTED
T4 FREE SERPL-MCNC: 1.7 NG/DL (ref 0.78–1.46)
TROPONIN I SERPL HS-MCNC: 335.2 PG/ML (ref 0–14)
TROPONIN I SERPL HS-MCNC: 464.7 PG/ML (ref 0–14)
TSH, 3RD GENERATION: 2.99 UIU/ML (ref 0.36–3.74)

## 2022-10-27 PROCEDURE — 76700 US EXAM ABDOM COMPLETE: CPT

## 2022-10-27 PROCEDURE — 96375 TX/PRO/DX INJ NEW DRUG ADDON: CPT | Performed by: EMERGENCY MEDICINE

## 2022-10-27 PROCEDURE — 84439 ASSAY OF FREE THYROXINE: CPT

## 2022-10-27 PROCEDURE — 6370000000 HC RX 637 (ALT 250 FOR IP): Performed by: FAMILY MEDICINE

## 2022-10-27 PROCEDURE — 6360000002 HC RX W HCPCS: Performed by: PHYSICIAN ASSISTANT

## 2022-10-27 PROCEDURE — 6360000004 HC RX CONTRAST MEDICATION: Performed by: FAMILY MEDICINE

## 2022-10-27 PROCEDURE — 2500000003 HC RX 250 WO HCPCS: Performed by: EMERGENCY MEDICINE

## 2022-10-27 PROCEDURE — 99223 1ST HOSP IP/OBS HIGH 75: CPT | Performed by: INTERNAL MEDICINE

## 2022-10-27 PROCEDURE — 6360000002 HC RX W HCPCS: Performed by: EMERGENCY MEDICINE

## 2022-10-27 PROCEDURE — 1100000000 HC RM PRIVATE

## 2022-10-27 PROCEDURE — 82140 ASSAY OF AMMONIA: CPT

## 2022-10-27 PROCEDURE — 93306 TTE W/DOPPLER COMPLETE: CPT | Performed by: INTERNAL MEDICINE

## 2022-10-27 PROCEDURE — 6370000000 HC RX 637 (ALT 250 FOR IP): Performed by: HOSPITALIST

## 2022-10-27 PROCEDURE — 74176 CT ABD & PELVIS W/O CONTRAST: CPT

## 2022-10-27 PROCEDURE — 71046 X-RAY EXAM CHEST 2 VIEWS: CPT

## 2022-10-27 PROCEDURE — 84443 ASSAY THYROID STIM HORMONE: CPT

## 2022-10-27 PROCEDURE — 0202U NFCT DS 22 TRGT SARS-COV-2: CPT

## 2022-10-27 PROCEDURE — 96374 THER/PROPH/DIAG INJ IV PUSH: CPT | Performed by: EMERGENCY MEDICINE

## 2022-10-27 PROCEDURE — 93306 TTE W/DOPPLER COMPLETE: CPT

## 2022-10-27 PROCEDURE — 2580000003 HC RX 258: Performed by: HOSPITALIST

## 2022-10-27 PROCEDURE — C9113 INJ PANTOPRAZOLE SODIUM, VIA: HCPCS | Performed by: EMERGENCY MEDICINE

## 2022-10-27 PROCEDURE — A4216 STERILE WATER/SALINE, 10 ML: HCPCS | Performed by: EMERGENCY MEDICINE

## 2022-10-27 PROCEDURE — 2580000003 HC RX 258: Performed by: EMERGENCY MEDICINE

## 2022-10-27 PROCEDURE — 6360000002 HC RX W HCPCS: Performed by: HOSPITALIST

## 2022-10-27 RX ORDER — LEVETIRACETAM 500 MG/1
750 TABLET ORAL 2 TIMES DAILY
Status: DISCONTINUED | OUTPATIENT
Start: 2022-10-27 | End: 2022-10-31 | Stop reason: HOSPADM

## 2022-10-27 RX ORDER — HYDRALAZINE HYDROCHLORIDE 20 MG/ML
10 INJECTION INTRAMUSCULAR; INTRAVENOUS EVERY 6 HOURS PRN
Status: DISCONTINUED | OUTPATIENT
Start: 2022-10-27 | End: 2022-10-31 | Stop reason: HOSPADM

## 2022-10-27 RX ORDER — MAGNESIUM HYDROXIDE/ALUMINUM HYDROXICE/SIMETHICONE 120; 1200; 1200 MG/30ML; MG/30ML; MG/30ML
30 SUSPENSION ORAL EVERY 6 HOURS PRN
Status: DISCONTINUED | OUTPATIENT
Start: 2022-10-27 | End: 2022-10-31 | Stop reason: HOSPADM

## 2022-10-27 RX ORDER — ACETAMINOPHEN 325 MG/1
650 TABLET ORAL EVERY 6 HOURS PRN
Status: DISCONTINUED | OUTPATIENT
Start: 2022-10-27 | End: 2022-10-31 | Stop reason: HOSPADM

## 2022-10-27 RX ORDER — ONDANSETRON 2 MG/ML
4 INJECTION INTRAMUSCULAR; INTRAVENOUS EVERY 6 HOURS PRN
Status: DISCONTINUED | OUTPATIENT
Start: 2022-10-27 | End: 2022-10-27

## 2022-10-27 RX ORDER — HYDROXYZINE HYDROCHLORIDE 25 MG/1
25 TABLET, FILM COATED ORAL EVERY 4 HOURS PRN
Status: DISCONTINUED | OUTPATIENT
Start: 2022-10-27 | End: 2022-10-27

## 2022-10-27 RX ORDER — LANOLIN ALCOHOL/MO/W.PET/CERES
6 CREAM (GRAM) TOPICAL NIGHTLY PRN
Status: DISCONTINUED | OUTPATIENT
Start: 2022-10-27 | End: 2022-10-31 | Stop reason: HOSPADM

## 2022-10-27 RX ORDER — ENOXAPARIN SODIUM 100 MG/ML
40 INJECTION SUBCUTANEOUS DAILY
Status: DISCONTINUED | OUTPATIENT
Start: 2022-10-27 | End: 2022-10-27 | Stop reason: ALTCHOICE

## 2022-10-27 RX ORDER — CLONIDINE HYDROCHLORIDE 0.2 MG/1
0.1 TABLET ORAL EVERY 4 HOURS PRN
Status: DISCONTINUED | OUTPATIENT
Start: 2022-10-27 | End: 2022-10-31 | Stop reason: HOSPADM

## 2022-10-27 RX ORDER — FUROSEMIDE 10 MG/ML
40 INJECTION INTRAMUSCULAR; INTRAVENOUS DAILY
Status: DISCONTINUED | OUTPATIENT
Start: 2022-10-27 | End: 2022-10-30

## 2022-10-27 RX ORDER — ACETAMINOPHEN 650 MG/1
650 SUPPOSITORY RECTAL EVERY 6 HOURS PRN
Status: DISCONTINUED | OUTPATIENT
Start: 2022-10-27 | End: 2022-10-31 | Stop reason: HOSPADM

## 2022-10-27 RX ORDER — POTASSIUM CHLORIDE 7.45 MG/ML
10 INJECTION INTRAVENOUS
Status: COMPLETED | OUTPATIENT
Start: 2022-10-27 | End: 2022-10-27

## 2022-10-27 RX ORDER — SODIUM CHLORIDE 0.9 % (FLUSH) 0.9 %
5-40 SYRINGE (ML) INJECTION PRN
Status: DISCONTINUED | OUTPATIENT
Start: 2022-10-27 | End: 2022-10-31 | Stop reason: HOSPADM

## 2022-10-27 RX ORDER — KETOROLAC TROMETHAMINE 15 MG/ML
15 INJECTION, SOLUTION INTRAMUSCULAR; INTRAVENOUS
Status: COMPLETED | OUTPATIENT
Start: 2022-10-27 | End: 2022-10-27

## 2022-10-27 RX ORDER — ONDANSETRON 4 MG/1
4 TABLET, ORALLY DISINTEGRATING ORAL EVERY 8 HOURS PRN
Status: DISCONTINUED | OUTPATIENT
Start: 2022-10-27 | End: 2022-10-27

## 2022-10-27 RX ORDER — SODIUM CHLORIDE 0.9 % (FLUSH) 0.9 %
5-40 SYRINGE (ML) INJECTION EVERY 12 HOURS SCHEDULED
Status: DISCONTINUED | OUTPATIENT
Start: 2022-10-27 | End: 2022-10-31 | Stop reason: HOSPADM

## 2022-10-27 RX ORDER — AMLODIPINE BESYLATE 10 MG/1
10 TABLET ORAL ONCE
Status: COMPLETED | OUTPATIENT
Start: 2022-10-27 | End: 2022-10-27

## 2022-10-27 RX ORDER — SODIUM CHLORIDE, SODIUM LACTATE, POTASSIUM CHLORIDE, AND CALCIUM CHLORIDE .6; .31; .03; .02 G/100ML; G/100ML; G/100ML; G/100ML
500 INJECTION, SOLUTION INTRAVENOUS
Status: COMPLETED | OUTPATIENT
Start: 2022-10-27 | End: 2022-10-27

## 2022-10-27 RX ORDER — POTASSIUM CHLORIDE 7.45 MG/ML
10 INJECTION INTRAVENOUS
Status: DISPENSED | OUTPATIENT
Start: 2022-10-27 | End: 2022-10-27

## 2022-10-27 RX ORDER — SODIUM CHLORIDE 9 MG/ML
INJECTION, SOLUTION INTRAVENOUS PRN
Status: DISCONTINUED | OUTPATIENT
Start: 2022-10-27 | End: 2022-10-31 | Stop reason: HOSPADM

## 2022-10-27 RX ORDER — LOSARTAN POTASSIUM 50 MG/1
50 TABLET ORAL DAILY
Status: ON HOLD | COMMUNITY
End: 2022-10-31 | Stop reason: HOSPADM

## 2022-10-27 RX ORDER — SPIRONOLACTONE 50 MG/1
50 TABLET, FILM COATED ORAL DAILY
COMMUNITY

## 2022-10-27 RX ORDER — SPIRONOLACTONE 25 MG/1
50 TABLET ORAL DAILY
Status: DISCONTINUED | OUTPATIENT
Start: 2022-10-27 | End: 2022-10-31 | Stop reason: HOSPADM

## 2022-10-27 RX ORDER — HYDROXYZINE HYDROCHLORIDE 25 MG/1
25 TABLET, FILM COATED ORAL EVERY 6 HOURS PRN
Status: DISCONTINUED | OUTPATIENT
Start: 2022-10-27 | End: 2022-10-31 | Stop reason: HOSPADM

## 2022-10-27 RX ORDER — HEPARIN SODIUM 5000 [USP'U]/ML
5000 INJECTION, SOLUTION INTRAVENOUS; SUBCUTANEOUS EVERY 8 HOURS SCHEDULED
Status: DISCONTINUED | OUTPATIENT
Start: 2022-10-27 | End: 2022-10-31 | Stop reason: HOSPADM

## 2022-10-27 RX ORDER — LACTULOSE 10 G/15ML
20 SOLUTION ORAL 2 TIMES DAILY
Status: DISCONTINUED | OUTPATIENT
Start: 2022-10-27 | End: 2022-10-31 | Stop reason: HOSPADM

## 2022-10-27 RX ORDER — POLYETHYLENE GLYCOL 3350 17 G/17G
17 POWDER, FOR SOLUTION ORAL DAILY PRN
Status: DISCONTINUED | OUTPATIENT
Start: 2022-10-27 | End: 2022-10-31 | Stop reason: HOSPADM

## 2022-10-27 RX ORDER — LOSARTAN POTASSIUM 50 MG/1
50 TABLET ORAL DAILY
Status: DISCONTINUED | OUTPATIENT
Start: 2022-10-27 | End: 2022-10-28

## 2022-10-27 RX ADMIN — AMLODIPINE BESYLATE 10 MG: 10 TABLET ORAL at 03:16

## 2022-10-27 RX ADMIN — LOSARTAN POTASSIUM 50 MG: 50 TABLET, FILM COATED ORAL at 08:49

## 2022-10-27 RX ADMIN — SODIUM CHLORIDE, PRESERVATIVE FREE 10 ML: 5 INJECTION INTRAVENOUS at 20:54

## 2022-10-27 RX ADMIN — CLONIDINE HYDROCHLORIDE 0.1 MG: 0.1 TABLET ORAL at 13:25

## 2022-10-27 RX ADMIN — FUROSEMIDE 40 MG: 10 INJECTION, SOLUTION INTRAMUSCULAR; INTRAVENOUS at 08:48

## 2022-10-27 RX ADMIN — LEVETIRACETAM 750 MG: 500 TABLET, FILM COATED ORAL at 20:47

## 2022-10-27 RX ADMIN — LEVETIRACETAM 750 MG: 500 TABLET, FILM COATED ORAL at 12:46

## 2022-10-27 RX ADMIN — POTASSIUM CHLORIDE 10 MEQ: 7.46 INJECTION, SOLUTION INTRAVENOUS at 18:09

## 2022-10-27 RX ADMIN — SPIRONOLACTONE 50 MG: 25 TABLET ORAL at 08:49

## 2022-10-27 RX ADMIN — SODIUM CHLORIDE 5 MG/HR: 9 INJECTION, SOLUTION INTRAVENOUS at 01:34

## 2022-10-27 RX ADMIN — LACTULOSE 20 G: 20 SOLUTION ORAL at 08:48

## 2022-10-27 RX ADMIN — DIATRIZOATE MEGLUMINE AND DIATRIZOATE SODIUM 15 ML: 660; 100 LIQUID ORAL; RECTAL at 08:49

## 2022-10-27 RX ADMIN — SODIUM CHLORIDE, POTASSIUM CHLORIDE, SODIUM LACTATE AND CALCIUM CHLORIDE 500 ML: 600; 310; 30; 20 INJECTION, SOLUTION INTRAVENOUS at 01:28

## 2022-10-27 RX ADMIN — POTASSIUM CHLORIDE 10 MEQ: 7.46 INJECTION, SOLUTION INTRAVENOUS at 17:01

## 2022-10-27 RX ADMIN — SODIUM CHLORIDE 40 MG: 9 INJECTION, SOLUTION INTRAMUSCULAR; INTRAVENOUS; SUBCUTANEOUS at 01:44

## 2022-10-27 RX ADMIN — KETOROLAC TROMETHAMINE 15 MG: 15 INJECTION, SOLUTION INTRAMUSCULAR; INTRAVENOUS at 01:30

## 2022-10-27 ASSESSMENT — PAIN SCALES - GENERAL: PAINLEVEL_OUTOF10: 10

## 2022-10-27 ASSESSMENT — ENCOUNTER SYMPTOMS
ABDOMINAL PAIN: 1
COUGH: 1

## 2022-10-27 NOTE — PROGRESS NOTES
TRANSFER - IN REPORT:    Verbal report received from [facility] on Cletus Kocher  being received from [facility] ED for routine progression of patient care      Report consisted of patient's Situation, Background, Assessment and   Recommendations(SBAR). Information from the following report(s) Adult Overview was reviewed with the receiving nurse. Opportunity for questions and clarification was provided. Assessment completed upon patient's arrival to unit and care assumed.

## 2022-10-27 NOTE — ED PROVIDER NOTES
Emergency Department Provider Note                   PCP:                Mustapha Olivas. NYA De Guzman NP               Age: 58 y.o. Sex: female     No diagnosis found. DISPOSITION          MDM  Number of Diagnoses or Management Options  Diagnosis management comments: Patient does not appear clinically ill or toxic and does not appear to be in any acute distress from pain. Given reports of a decreased activity over the course of the day today and ammonia level will be obtained due to the patient's history of liver disease as well as thyroid studies in addition to evaluation for possible respiratory illness. Patient's hemoglobin is stable and there are no significant abnormalities on laboratory testing at this time to suggest an acute or significant intra-abdominal pathology.        Amount and/or Complexity of Data Reviewed  Clinical lab tests: ordered and reviewed  Tests in the radiology section of CPT®: ordered and reviewed  Review and summarize past medical records: yes  Independent visualization of images, tracings, or specimens: yes (EKG at presentation performed at 2216 on 26 October 2022 shows sinus tachycardia with a rate of 101 bpm.  Poor R wave progression is noted but no acute ischemic changes.)    Risk of Complications, Morbidity, and/or Mortality  Presenting problems: moderate  Diagnostic procedures: moderate  Management options: moderate    Patient Progress  Patient progress: stable             Orders Placed This Encounter   Procedures    Respiratory Panel, Molecular, with COVID-19 (Restricted: peds pts or suitable admitted adults)    XR CHEST (2 VW)    CBC with Diff    CMP    Lipase    Troponin    Troponin    Ammonia    TSH    T4, Free    Diet NPO    POCT Urine Dipstick    EKG 12 Lead (Select if Upper Abd Pain, or SOB, Diaphoresis or Tachy)    Saline lock IV        Medications   lactated ringers bolus (has no administration in time range)   ketorolac (TORADOL) injection 15 mg (has no administration in time range)   pantoprazole (PROTONIX) 40 mg in sodium chloride (PF) 0.9 % 10 mL injection (has no administration in time range)       New Prescriptions    No medications on file        Harrison Alanis is a 58 y.o. female who presents to the Emergency Department with chief complaint of    Chief Complaint   Patient presents with    Abdominal Pain     Epigastric and lower abdominal        Triage note:Patient presents in wheelchair to triage in mild distress with friend. Patient st she has been feeling bad for about a week. Patient sts she just stays on the couch at home and doesn't get up or go out much. Pt has abdominal pain, localized to epigastric region and lower abdomen as well as a cough. Pt expresses that she has had chills at home but no fever. Pt sts she has had nothing but ginger ale and water to drink. Pt cough is junky. Patient's primary complaint on my evaluation is epigastric abdominal pain. She states its been coming and going for better part of a week. She describes it as burning sensation in the epigastric region and nonradiating. No increasing or decreasing factors are noted. She states the only time it seems to go away as if she can get to sleep. She reports having been laying on the couch just all day today. She denies any fever or chills. She does report one episode of vomiting but denies any diarrhea or constipation. She is not currently nauseated. She currently rates the pain as 10/10 in intensity. The history is provided by the patient and medical records. Review of Systems   Constitutional:  Negative for chills and fever. Respiratory:  Positive for cough. Gastrointestinal:  Positive for abdominal pain. All other systems reviewed and are negative.     Past Medical History:   Diagnosis Date    Hypertension     Infectious disease     hep c        Past Surgical History:   Procedure Laterality Date    ORTHOPEDIC SURGERY      evie in right leg        Family History   Problem Relation Age of Onset    Lung Disease Mother         Social History     Socioeconomic History    Marital status: Single   Tobacco Use    Smoking status: Every Day     Packs/day: 2.00     Types: Cigarettes    Smokeless tobacco: Current   Substance and Sexual Activity    Alcohol use: Yes    Drug use: No         Patient has no known allergies. Previous Medications    CHOLESTYRAMINE LIGHT (PREVALITE) 4 GM/DOSE POWDER    Take by mouth 2 times daily (with meals)    DOXEPIN (SINEQUAN) 25 MG CAPSULE    Take 25 mg by mouth    ERGOCALCIFEROL (ERGOCALCIFEROL) 1.25 MG (49477 UT) CAPSULE    Take 50,000 Units by mouth    HYDROXYZINE (ATARAX) 25 MG TABLET    Take 25 mg by mouth every 4 hours as needed    LISINOPRIL (PRINIVIL;ZESTRIL) 20 MG TABLET    Take 20 mg by mouth daily    MAGNESIUM OXIDE (MAG-OX) 400 MG TABLET    Take 400 mg by mouth 2 times daily    POTASSIUM CHLORIDE (KLOR-CON M) 20 MEQ EXTENDED RELEASE TABLET    Take 20 mEq by mouth daily    TRAMADOL (ULTRAM) 50 MG TABLET    Take 50 mg by mouth every 6 hours as needed. Vitals signs and nursing note reviewed. Patient Vitals for the past 4 hrs:   Temp Pulse Resp BP SpO2   10/26/22 2222 98.6 °F (37 °C) (!) 102 16 (!) 208/133 97 %          Physical Exam  Vitals and nursing note reviewed. Constitutional:       General: She is not in acute distress. Appearance: Normal appearance. She is not ill-appearing, toxic-appearing or diaphoretic. HENT:      Head: Normocephalic and atraumatic. Nose: Nose normal.   Eyes:      Extraocular Movements: Extraocular movements intact. Conjunctiva/sclera: Conjunctivae normal.      Pupils: Pupils are equal, round, and reactive to light. Cardiovascular:      Rate and Rhythm: Normal rate and regular rhythm. Pulses: Normal pulses. Heart sounds: Normal heart sounds. Pulmonary:      Effort: Pulmonary effort is normal.      Breath sounds: Normal breath sounds.    Abdominal:      General: Bowel sounds are normal. There is no distension. Palpations: Abdomen is soft. Tenderness: There is abdominal tenderness. There is no right CVA tenderness, left CVA tenderness, guarding or rebound. Comments: Epigastric tenderness is present without rebound rigidity or guarding. Musculoskeletal:      Cervical back: Normal range of motion and neck supple. No rigidity. Lymphadenopathy:      Cervical: No cervical adenopathy. Skin:     General: Skin is warm and dry. Neurological:      General: No focal deficit present. Mental Status: She is alert and oriented to person, place, and time. Mental status is at baseline. Psychiatric:         Mood and Affect: Mood normal.         Behavior: Behavior normal.         Thought Content:  Thought content normal.        Procedures    Results for orders placed or performed during the hospital encounter of 10/26/22   CBC with Diff   Result Value Ref Range    WBC 5.1 4.3 - 11.1 K/uL    RBC 2.78 (L) 4.05 - 5.2 M/uL    Hemoglobin 8.8 (L) 11.7 - 15.4 g/dL    Hematocrit 26.5 (L) 35.8 - 46.3 %    MCV 95.3 82 - 102 FL    MCH 31.7 26.1 - 32.9 PG    MCHC 33.2 31.4 - 35.0 g/dL    RDW 13.5 11.9 - 14.6 %    Platelets 297 505 - 301 K/uL    MPV 10.8 9.4 - 12.3 FL    nRBC 0.00 0.0 - 0.2 K/uL    Differential Type AUTOMATED      Seg Neutrophils 79 (H) 43 - 78 %    Lymphocytes 14 13 - 44 %    Monocytes 6 4.0 - 12.0 %    Eosinophils % 0 (L) 0.5 - 7.8 %    Basophils 0 0.0 - 2.0 %    Immature Granulocytes 1 0.0 - 5.0 %    Segs Absolute 4.0 1.7 - 8.2 K/UL    Absolute Lymph # 0.7 0.5 - 4.6 K/UL    Absolute Mono # 0.3 0.1 - 1.3 K/UL    Absolute Eos # 0.0 0.0 - 0.8 K/UL    Basophils Absolute 0.0 0.0 - 0.2 K/UL    Absolute Immature Granulocyte 0.0 0.0 - 0.5 K/UL   CMP   Result Value Ref Range    Sodium 138 133 - 143 mmol/L    Potassium 3.6 3.5 - 5.1 mmol/L    Chloride 110 101 - 110 mmol/L    CO2 20 (L) 21 - 32 mmol/L    Anion Gap 8 2 - 11 mmol/L    Glucose 109 (H) 65 - 100 mg/dL    BUN 10 8 - 23 MG/DL    Creatinine 1.30 (H) 0.6 - 1.0 MG/DL    Est, Glom Filt Rate 46 (L) >60 ml/min/1.73m2    Calcium 9.1 8.3 - 10.4 MG/DL    Total Bilirubin 1.5 (H) 0.2 - 1.1 MG/DL    ALT 14 12 - 65 U/L    AST 26 15 - 37 U/L    Alk Phosphatase 81 50 - 136 U/L    Total Protein 8.3 (H) 6.3 - 8.2 g/dL    Albumin 2.8 (L) 3.2 - 4.6 g/dL    Globulin 5.5 (H) 2.8 - 4.5 g/dL    Albumin/Globulin Ratio 0.5 0.4 - 1.6     Lipase   Result Value Ref Range    Lipase 155 73 - 393 U/L   EKG 12 Lead (Select if Upper Abd Pain, or SOB, Diaphoresis or Tachy)   Result Value Ref Range    Ventricular Rate 101 BPM    Atrial Rate 101 BPM    P-R Interval 152 ms    QRS Duration 78 ms    Q-T Interval 426 ms    QTc Calculation (Bazett) 552 ms    P Axis 46 degrees    R Axis 26 degrees    T Axis 42 degrees    Diagnosis Sinus tachycardia         XR CHEST (2 VW)    (Results Pending)                       Voice dictation software was used during the making of this note. This software is not perfect and grammatical and other typographical errors may be present. This note has not been completely proofread for errors.      Poppy Ellis, DO  10/27/22 [address], DO  10/27/22 [address], DO  10/27/22 4643

## 2022-10-27 NOTE — ED TRIAGE NOTES
Patient presents in wheelchair to triage in mild distress with friend. Patient st she has been feeling bad for about a week. Patient sts she just stays on the couch at home and doesn't get up or go out much. Pt has abdominal pain, localized to epigastric region and lower abdomen as well as a cough. Pt expresses that she has had chills at home but no fever. Pt sts she has had nothing but ginger ale and water to drink. Pt cough is junky.

## 2022-10-27 NOTE — ED NOTES
Patient given lunch tray, assisted to set up. No further needs or requests at this time.       Jaspal Marrero RN  10/27/22 2977

## 2022-10-27 NOTE — PROGRESS NOTES
Hospitalist Progress Note   Admit Date:  10/26/2022 11:59 PM   Name:  Sophie Flores   Age:  58 y.o. Sex:  female  :  1960   MRN:  [id number]   Room:  ER05/    Presenting Complaint: Abdominal Pain (Epigastric and lower abdominal/)     Reason(s) for Admission: Hypertensive urgency [I16.0]     Hospital Course:   Sophie Flores is a 58 y.o. female with medical history of hypertension, seizure disorder, noncompliance, who presented to triage in mild distress. Patient st she has been feeling bad for about a week. She has lethargy and weakness. Pt has abdominal pain, localized to epigastric region and lower abdomen as well as a cough. Pt expresses that she has had chills at home but no fever. Pt sts she has had nothing but ginger ale and water to drink. Pt cough is junky. Patient's primary complaint on my evaluation is epigastric abdominal pain. She states its been coming and going for better part of a week. She describes it as burning sensation in the epigastric region and nonradiating. No increasing or decreasing factors are noted. She states the only time it seems to go away as if she can get to sleep. She reports having been laying on the couch just all day today. She denies any fever or chills. She does report one episode of vomiting but denies any diarrhea or constipation. She is not currently nauseated. She currently rates the pain as 10/10 in intensity. Initial blood pressure in the emergency room was 208/133. She was placed on a Cardene drip and hospitalist asked to admit. Labs show a hemoglobin of 8.8. Ammonia level 46 and troponin is 464.7.         Subjective & 24hr Events (10/27/22):     C/o chest pain and upper abdominal pain  No chest pain at the time of my examination    Assessment & Plan:         Hypertensive urgency  Off cardene drip  Cont home blood pressure medications        Demand ischemia [facility])  Cardiology following, echo pending      Ascites  GI following, pt for egd Tomorrow  On lasix, spironolactone      QT prolongation    HCV cirrhosis (HCC)    Seizure disorder  Cont keppra    Diet:  ADULT DIET; Regular  DVT PPx: heparin  Code status: Full Code    Hospital Problems:  Principal Problem:    Hypertensive urgency  Active Problems:    Demand ischemia (HCC)    Ascites    QT prolongation    Decompensated HCV cirrhosis ([facility] [address]  Resolved Problems:    * No resolved hospital problems.  *      Objective:   Patient Vitals for the past 24 hrs:   Temp Pulse Resp BP SpO2   10/27/22 0855 -- 93 -- (!) 171/107 97 %   10/27/22 0815 -- 87 -- (!) 169/97 (!) 88 %   10/27/22 0656 -- 91 -- (!) 162/98 97 %   10/27/22 0646 -- 94 -- (!) 173/95 97 %   10/27/22 0626 -- 91 -- (!) 145/96 96 %   10/27/22 0616 -- 88 -- (!) 151/95 93 %   10/27/22 0555 -- 88 -- (!) 144/93 93 %   10/27/22 0540 -- 88 -- (!) 141/84 93 %   10/27/22 0526 -- 92 -- -- 93 %   10/27/22 0525 -- 91 -- (!) 141/81 94 %   10/27/22 0516 -- 91 -- (!) 145/89 96 %   10/27/22 0506 -- 93 -- -- 94 %   10/27/22 0456 -- 95 -- (!) 142/85 96 %   10/27/22 0446 -- 92 -- 137/86 94 %   10/27/22 0436 -- 89 -- -- 93 %   10/27/22 0426 -- 90 -- 139/81 --   10/27/22 0416 -- 93 -- 135/80 --   10/27/22 0406 -- 93 -- -- 100 %   10/27/22 0356 -- 94 -- 134/84 93 %   10/27/22 0346 -- 99 -- -- 94 %   10/27/22 0342 -- 94 -- (!) 144/89 93 %   10/27/22 0336 -- 93 -- -- 94 %   10/27/22 0332 -- 94 -- 138/84 93 %   10/27/22 0326 -- 95 -- -- 94 %   10/27/22 0316 -- -- -- (!) 144/88 --   10/27/22 0312 -- (!) 105 -- (!) 144/88 98 %   10/27/22 0302 -- (!) 101 -- (!) 163/99 95 %   10/27/22 0252 -- (!) 103 -- -- 96 %   10/27/22 0242 -- (!) 101 -- (!) 148/90 96 %   10/27/22 0232 -- 100 -- (!) 143/112 97 %   10/27/22 0222 -- 96 24 -- 94 %   10/27/22 0212 -- 100 28 (!) 165/104 94 %   10/27/22 0202 -- (!) 117 25 (!) 187/128 97 %   10/27/22 0152 -- 95 (!) 32 -- 96 %   10/27/22 0142 -- 94 24 (!) 190/123 96 %   10/27/22 0132 -- 95 17 (!) 194/130 96 %   10/27/22 0112 -- 89 (!) 37 (!) 194/126 94 %   10/27/22 0102 -- 92 17 (!) 192/122 97 %   10/27/22 0052 -- 96 25 (!) 185/123 96 %   10/27/22 0012 -- 94 18 (!) 176/128 96 %   10/26/22 2222 98.6 °F (37 °C) (!) 102 16 (!) 208/133 97 %       Oxygen Therapy  SpO2: 97 %  Pulse via Oximetry: 90 beats per minute  O2 Device: None (Room air)    Estimated body mass index is 24.59 kg/m² as calculated from the following:    Height as of this encounter: 5' 7\" (1.702 m). Weight as of 9/23/22: 157 lb (71.2 kg). No intake or output data in the 24 hours ending 10/27/22 1210      Physical Exam:     Blood pressure (!) 171/107, pulse 93, temperature 98.6 °F (37 °C), temperature source Oral, resp. rate 24, height 5' 7\" (1.702 m), SpO2 97 %. General:    Well nourished. Head:  Normocephalic, atraumatic  Eyes:  Sclerae appear normal.  Pupils equally round. ENT:  Nares appear normal, no drainage. Moist oral mucosa  Neck:  No restricted ROM. Trachea midline   CV:   RRR. No m/r/g. No jugular venous distension. Lungs:   CTAB. No wheezing, rhonchi, or rales. Symmetric expansion. Abdomen: Bowel sounds present. Mild epigastric tenderness, mild guarding,no rebound   Extremities: No cyanosis or clubbing. No edema  Skin:     No rashes and normal coloration. Warm and dry. Neuro:  CN II-XII grossly intact. Sensation intact. A&Ox3  Psych:  Normal mood and affect.       I have personally reviewed labs and tests showing:  Recent Labs:  Recent Results (from the past 48 hour(s))   EKG 12 Lead (Select if Upper Abd Pain, or SOB, Diaphoresis or Tachy)    Collection Time: 10/26/22 10:16 PM   Result Value Ref Range    Ventricular Rate 101 BPM    Atrial Rate 101 BPM    P-R Interval 152 ms    QRS Duration 78 ms    Q-T Interval 426 ms    QTc Calculation (Bazett) 552 ms    P Axis 46 degrees    R Axis 26 degrees    T Axis 42 degrees    Diagnosis Sinus tachycardia    CBC with Diff    Collection Time: 10/26/22 10:35 PM   Result Value Ref Range    WBC 5.1 4.3 - 11.1 K/uL RBC 2.78 (L) 4.05 - 5.2 M/uL    Hemoglobin 8.8 (L) 11.7 - 15.4 g/dL    Hematocrit 26.5 (L) 35.8 - 46.3 %    MCV 95.3 82 - 102 FL    MCH 31.7 26.1 - 32.9 PG    MCHC 33.2 31.4 - 35.0 g/dL    RDW 13.5 11.9 - 14.6 %    Platelets 722 584 - 957 K/uL    MPV 10.8 9.4 - 12.3 FL    nRBC 0.00 0.0 - 0.2 K/uL    Differential Type AUTOMATED      Seg Neutrophils 79 (H) 43 - 78 %    Lymphocytes 14 13 - 44 %    Monocytes 6 4.0 - 12.0 %    Eosinophils % 0 (L) 0.5 - 7.8 %    Basophils 0 0.0 - 2.0 %    Immature Granulocytes 1 0.0 - 5.0 %    Segs Absolute 4.0 1.7 - 8.2 K/UL    Absolute Lymph # 0.7 0.5 - 4.6 K/UL    Absolute Mono # 0.3 0.1 - 1.3 K/UL    Absolute Eos # 0.0 0.0 - 0.8 K/UL    Basophils Absolute 0.0 0.0 - 0.2 K/UL    Absolute Immature Granulocyte 0.0 0.0 - 0.5 K/UL   CMP    Collection Time: 10/26/22 10:35 PM   Result Value Ref Range    Sodium 138 133 - 143 mmol/L    Potassium 3.6 3.5 - 5.1 mmol/L    Chloride 110 101 - 110 mmol/L    CO2 20 (L) 21 - 32 mmol/L    Anion Gap 8 2 - 11 mmol/L    Glucose 109 (H) 65 - 100 mg/dL    BUN 10 8 - 23 MG/DL    Creatinine 1.30 (H) 0.6 - 1.0 MG/DL    Est, Glom Filt Rate 46 (L) >60 ml/min/1.73m2    Calcium 9.1 8.3 - 10.4 MG/DL    Total Bilirubin 1.5 (H) 0.2 - 1.1 MG/DL    ALT 14 12 - 65 U/L    AST 26 15 - 37 U/L    Alk Phosphatase 81 50 - 136 U/L    Total Protein 8.3 (H) 6.3 - 8.2 g/dL    Albumin 2.8 (L) 3.2 - 4.6 g/dL    Globulin 5.5 (H) 2.8 - 4.5 g/dL    Albumin/Globulin Ratio 0.5 0.4 - 1.6     Lipase    Collection Time: 10/26/22 10:35 PM   Result Value Ref Range    Lipase 155 73 - 393 U/L   Troponin    Collection Time: 10/26/22 10:35 PM   Result Value Ref Range    Troponin, High Sensitivity 464.7 (HH) 0 - 14 pg/mL   Respiratory Panel, Molecular, with COVID-19 (Restricted: peds pts or suitable admitted adults)    Collection Time: 10/27/22 12:17 AM    Specimen: Nasopharyngeal   Result Value Ref Range    Adenovirus by PCR NOT DETECTED NOTDET      Coronavirus 229E by PCR NOT DETECTED NOTDET Coronavirus HKU1 by PCR NOT DETECTED NOTDET      Coronavirus NL63 by PCR NOT DETECTED NOTDET      Coronavirus OC43 by PCR NOT DETECTED NOTDET      SARS-CoV-2, PCR NOT DETECTED NOTDET      Human Metapneumovirus by PCR NOT DETECTED NOTDET      Rhinovirus Enterovirus PCR Detected (A) NOTDET      Influenza A by PCR NOT DETECTED NOTDET      Influenza B PCR NOT DETECTED NOTDET      Parainfluenza 1 PCR NOT DETECTED NOTDET      Parainfluenza 2 PCR NOT DETECTED NOTDET      Parainfluenza 3 PCR NOT DETECTED NOTDET      Parainfluenza 4 PCR NOT DETECTED NOTDET      Respiratory Syncytial Virus by PCR NOT DETECTED NOTDET      Bordetella parapertussis by PCR NOT DETECTED NOTDET      Bordetella pertussis by PCR NOT DETECTED NOTDET      Chlamydophila Pneumonia PCR NOT DETECTED NOTDET      Mycoplasma pneumo by PCR NOT DETECTED NOTDET     TSH    Collection Time: 10/27/22 12:30 AM   Result Value Ref Range    TSH, 3RD GENERATION 2.990 0.358 - 3.740 uIU/mL   T4, Free    Collection Time: 10/27/22 12:30 AM   Result Value Ref Range    T4 Free 1.7 (H) 0.78 - 1.46 NG/DL   Ammonia    Collection Time: 10/27/22 12:52 AM   Result Value Ref Range    Ammonia 46 (H) 11 - 32 UMOL/L   Troponin    Collection Time: 10/27/22  9:31 AM   Result Value Ref Range    Troponin, High Sensitivity 335.2 (HH) 0 - 14 pg/mL       I have personally reviewed imaging studies showing: Other Studies:  US ABDOMEN COMPLETE   Final Result   1. Cirrhotic appearance of the liver. Possible mass in the left lobe. Liver   protocol CT/MRI is recommended. 2.  Cholelithiasis. 3.  Mild ascites. XR CHEST (2 VW)   Final Result   Mild blunting of the right costophrenic angle may be due to atelectasis. Otherwise, no evidence of an acute intrathoracic process.          CT ABDOMEN PELVIS WO CONTRAST Additional Contrast? Oral    (Results Pending)       Current Meds:  Current Facility-Administered Medications   Medication Dose Route Frequency    spironolactone (ALDACTONE) tablet 50 mg  50 mg Oral Daily    losartan (COZAAR) tablet 50 mg  50 mg Oral Daily    sodium chloride flush 0.9 % injection 5-40 mL  5-40 mL IntraVENous 2 times per day    sodium chloride flush 0.9 % injection 5-40 mL  5-40 mL IntraVENous PRN    0.9 % sodium chloride infusion   IntraVENous PRN    ondansetron (ZOFRAN-ODT) disintegrating tablet 4 mg  4 mg Oral Q8H PRN    Or    ondansetron (ZOFRAN) injection 4 mg  4 mg IntraVENous Q6H PRN    polyethylene glycol (GLYCOLAX) packet 17 g  17 g Oral Daily PRN    aluminum & magnesium hydroxide-simethicone (MAALOX) 200-200-20 MG/5ML suspension 30 mL  30 mL Oral Q6H PRN    acetaminophen (TYLENOL) tablet 650 mg  650 mg Oral Q6H PRN    Or    acetaminophen (TYLENOL) suppository 650 mg  650 mg Rectal Q6H PRN    furosemide (LASIX) injection 40 mg  40 mg IntraVENous Daily    lactulose (CHRONULAC) 10 GM/15ML solution 20 g  20 g Oral BID    cloNIDine (CATAPRES) tablet 0.1 mg  0.1 mg Oral Q4H PRN    melatonin tablet 6 mg  6 mg Oral Nightly PRN    hydrOXYzine HCl (ATARAX) tablet 25 mg  25 mg Oral Q6H PRN    diatrizoate meglumine-sodium (GASTROGRAFIN) 66-10 % solution 15 mL  15 mL Oral ONCE PRN     Current Outpatient Medications   Medication Sig    spironolactone (ALDACTONE) 50 MG tablet Take 50 mg by mouth daily    losartan (COZAAR) 50 MG tablet Take 50 mg by mouth daily    cholestyramine light (PREVALITE) 4 GM/DOSE powder Take by mouth 2 times daily (with meals)    ergocalciferol (ERGOCALCIFEROL) 1.25 MG (95845 UT) capsule Take 50,000 Units by mouth    hydrOXYzine (ATARAX) 25 MG tablet Take 25 mg by mouth every 4 hours as needed    magnesium oxide (MAG-OX) 400 MG tablet Take 400 mg by mouth 2 times daily    potassium chloride (KLOR-CON M) 20 MEQ extended release tablet Take 20 mEq by mouth daily    traMADol (ULTRAM) 50 MG tablet Take 50 mg by mouth every 6 hours as needed.        Signed:  Srini Burton MD

## 2022-10-27 NOTE — CARE COORDINATION
Chart review complete, CM met with pt at bedside, pt found sleeping but easily awakens, pt normally lives alone but step daughter has been staying with her lately. No anticipated dc needs noted at this time, CM staff will remain available to assist as needed. Please consult if needed. Demographics, insurance and PCP confirmed. CM staff will remain available to assist with needs as needed. Please consult if needed. 10/27/22 1034   Service Assessment   Patient Orientation Alert and Oriented   Cognition Alert   History Provided By Patient   Primary Caregiver Self   Accompanied By/Relationship none   Support Systems Family Members   Patient's Healthcare Decision Maker is: Legal Next of Ro Fisher   PCP Verified by CM Yes  (Laith De Guzman NP has upcoming appointment)   Prior Functional Level Independent in ADLs/IADLs   Current Functional Level Independent in ADLs/IADLs   Can patient return to prior living arrangement Yes   Ability to make needs known: Good   Family able to assist with home care needs: Yes   Would you like for me to discuss the discharge plan with any other family members/significant others, and if so, who?  No   Financial Resources [employer] Other (Comment)  (none)   CM/SW Referral Other (see comment)  (none CM will follow)   Social/Functional History   Lives With Daughter  (pt states she normally lives alone but her step daughter in staying with her right now.)   Type of [address] Two level   Home Access Stairs to enter with rails   Entrance Stairs - Number of Steps none   [address]   (none)   ADL Assistance Independent   Homemaking Assistance Independent   Ambulation Assistance Independent   Transfer Assistance Independent   Active  No   Patient's  Info family   Mode of Transportation Car   Occupation Unemployed   Discharge Planning   Type of [place] Other (Comment)  (step

## 2022-10-27 NOTE — H&P
Hospitalist History and Physical   Admit Date:  10/26/2022 11:59 PM   Name:  Ramandeep Henry   Age:  58 y.o. Sex:  female  :  1960   MRN:  [id number]   Room:  ER05/    Presenting Complaint: Abdominal Pain (Epigastric and lower abdominal/)     Reason(s) for Admission: Hypertensive urgency [I16.0]     History of Present Illness:   Ramandeep Henry is a 58 y.o. female with medical history of hypertension, seizure disorder, noncompliance, who presented to triage in mild distress. Patient st she has been feeling bad for about a week. She has lethargy and weakness. Pt has abdominal pain, localized to epigastric region and lower abdomen as well as a cough. Pt expresses that she has had chills at home but no fever. Pt sts she has had nothing but ginger ale and water to drink. Pt cough is junky. Patient's primary complaint on my evaluation is epigastric abdominal pain. She states its been coming and going for better part of a week. She describes it as burning sensation in the epigastric region and nonradiating. No increasing or decreasing factors are noted. She states the only time it seems to go away as if she can get to sleep. She reports having been laying on the couch just all day today. She denies any fever or chills. She does report one episode of vomiting but denies any diarrhea or constipation. She is not currently nauseated. She currently rates the pain as 10/10 in intensity. Initial blood pressure in the emergency room was 208/133. She was placed on a Cardene drip and hospitalist asked to admit. Labs show a hemoglobin of 8.8. Ammonia level 46 and troponin is 464.7. Review of Systems:  10 systems reviewed and negative except as noted in HPI. Assessment & Plan:     Principal Problem:    Hypertensive urgency -in noncompliant patient that has been prescribed numerous medications. .     Plan:   Admit med bed with remote telemetry, INPATIENT STATUS due to medical complexity, need for close cardiopulmonary monitoring given initial unstable vitals signs & need for IV medication  2. Will wean off Cardene drip and restart her blood pressure medications. 3.  Continue to trend troponins and check echocardiogram.  4.   Check abdominal ultrasound to evaluate liver as well as for ascites  5. Will consult gastroenterology to assist with management of her epigastric pain, anemia, HCV  6. We will start patient on Kayexalate for her elevated ammonia level. Active Problems:    Demand ischemia ([facility] [place] [address]  Plan:     Ascites  Plan:     Decompensated HCV cirrhosis ([facility] [place] [address]  Plan:       Anticipated discharge needs:         Diet: ADULT DIET; Regular  VTE ppx: SCD due to anemia  Code status: Full Code    Hospital Problems:  Principal Problem:    Hypertensive urgency  Active Problems:    Demand ischemia ([facility] [place] [address]    Ascites    Decompensated HCV cirrhosis ([facility] [place] [address]  Resolved Problems:    * No resolved hospital problems.  *       Past History:     Past Medical History:   Diagnosis Date    Hypertension     Infectious disease     hep c       Past Surgical History:   Procedure Laterality Date    ORTHOPEDIC SURGERY      evie in right leg        Social History     Tobacco Use    Smoking status: Every Day     Packs/day: 2.00     Types: Cigarettes    Smokeless tobacco: Current   Substance Use Topics    Alcohol use: Yes      Social History     Substance and Sexual Activity   Drug Use No       Family History   Problem Relation Age of Onset    Lung Disease Mother         Immunization History   Administered Date(s) Administered    Tdap (Boostrix, Adacel) 11/02/2021     No Known Allergies  Prior to Admit Medications:  Current Outpatient Medications   Medication Instructions    cholestyramine light (PREVALITE) 4 GM/DOSE powder Oral, 2 TIMES DAILY WITH MEALS    ergocalciferol (ERGOCALCIFEROL) 50,000 Units, Oral    hydrOXYzine HCl (ATARAX) 25 mg, Oral, EVERY 4 HOURS PRN    losartan (COZAAR) 50 mg, Oral, DAILY    magnesium oxide (MAG-OX) 400 mg, Oral, 2 TIMES DAILY    potassium chloride (KLOR-CON M) 20 MEQ extended release tablet 20 mEq, Oral, DAILY    spironolactone (ALDACTONE) 50 mg, Oral, DAILY    traMADol (ULTRAM) 50 mg, Oral, EVERY 6 HOURS PRN         Objective:   Patient Vitals for the past 24 hrs:   Temp Pulse Resp BP SpO2   10/27/22 0342 -- 94 -- (!) 144/89 93 %   10/27/22 0332 -- 94 -- 138/84 93 %   10/27/22 0316 -- -- -- (!) 144/88 --   10/27/22 0312 -- (!) 105 -- (!) 144/88 98 %   10/27/22 0302 -- (!) 101 -- (!) 163/99 95 %   10/27/22 0252 -- (!) 103 -- -- 96 %   10/27/22 0242 -- (!) 101 -- (!) 148/90 96 %   10/27/22 0232 -- 100 -- (!) 143/112 97 %   10/27/22 0222 -- 96 24 -- 94 %   10/27/22 0212 -- 100 28 (!) 165/104 94 %   10/27/22 0202 -- (!) 117 25 (!) 187/128 97 %   10/27/22 0152 -- 95 (!) 32 -- 96 %   10/27/22 0142 -- 94 24 (!) 190/123 96 %   10/27/22 0132 -- 95 17 (!) 194/130 96 %   10/27/22 0112 -- 89 (!) 37 (!) 194/126 94 %   10/27/22 0102 -- 92 17 (!) 192/122 97 %   10/27/22 0052 -- 96 25 (!) 185/123 96 %   10/27/22 0012 -- 94 18 (!) 176/128 96 %   10/26/22 2222 98.6 °F (37 °C) (!) 102 16 (!) 208/133 97 %       Oxygen Therapy  SpO2: 93 %  O2 Device: None (Room air)    Estimated body mass index is 24.59 kg/m² as calculated from the following:    Height as of this encounter: 5' 7\" (1.702 m). Weight as of 9/23/22: 157 lb (71.2 kg). No intake or output data in the 24 hours ending 10/27/22 0420      Physical Exam:    Blood pressure (!) 144/89, pulse 94, temperature 98.6 °F (37 °C), temperature source Oral, resp. rate 24, height 5' 7\" (1.702 m), SpO2 93 %. General:    Well nourished. No acute distress. Alert and oriented x3. Head:  Normocephalic, atraumatic  Eyes:  Sclerae appear normal.  Pupils equally round. ENT:  Nares appear normal, no drainage. Moist oral mucosa  Neck:  No restricted ROM. Trachea midline   CV:   RRR. No m/r/g. No jugular venous distension. Lungs:   CTAB. No wheezing, rhonchi, or rales.   Symmetric expansion. Abdomen: Bowel sounds present. Soft, epigastric tenderness, mild distend. Extremities: No cyanosis or clubbing. No edema  Skin:     No rashes and normal coloration. Warm and dry. Neuro:  CN II-XII grossly intact. Sensation intact. A&Ox3  Psych:  Normal mood and affect.       I have personally reviewed labs and tests showing:  Recent Labs:  Recent Results (from the past 24 hour(s))   EKG 12 Lead (Select if Upper Abd Pain, or SOB, Diaphoresis or Tachy)    Collection Time: 10/26/22 10:16 PM   Result Value Ref Range    Ventricular Rate 101 BPM    Atrial Rate 101 BPM    P-R Interval 152 ms    QRS Duration 78 ms    Q-T Interval 426 ms    QTc Calculation (Bazett) 552 ms    P Axis 46 degrees    R Axis 26 degrees    T Axis 42 degrees    Diagnosis Sinus tachycardia    CBC with Diff    Collection Time: 10/26/22 10:35 PM   Result Value Ref Range    WBC 5.1 4.3 - 11.1 K/uL    RBC 2.78 (L) 4.05 - 5.2 M/uL    Hemoglobin 8.8 (L) 11.7 - 15.4 g/dL    Hematocrit 26.5 (L) 35.8 - 46.3 %    MCV 95.3 82 - 102 FL    MCH 31.7 26.1 - 32.9 PG    MCHC 33.2 31.4 - 35.0 g/dL    RDW 13.5 11.9 - 14.6 %    Platelets 395 618 - 010 K/uL    MPV 10.8 9.4 - 12.3 FL    nRBC 0.00 0.0 - 0.2 K/uL    Differential Type AUTOMATED      Seg Neutrophils 79 (H) 43 - 78 %    Lymphocytes 14 13 - 44 %    Monocytes 6 4.0 - 12.0 %    Eosinophils % 0 (L) 0.5 - 7.8 %    Basophils 0 0.0 - 2.0 %    Immature Granulocytes 1 0.0 - 5.0 %    Segs Absolute 4.0 1.7 - 8.2 K/UL    Absolute Lymph # 0.7 0.5 - 4.6 K/UL    Absolute Mono # 0.3 0.1 - 1.3 K/UL    Absolute Eos # 0.0 0.0 - 0.8 K/UL    Basophils Absolute 0.0 0.0 - 0.2 K/UL    Absolute Immature Granulocyte 0.0 0.0 - 0.5 K/UL   CMP    Collection Time: 10/26/22 10:35 PM   Result Value Ref Range    Sodium 138 133 - 143 mmol/L    Potassium 3.6 3.5 - 5.1 mmol/L    Chloride 110 101 - 110 mmol/L    CO2 20 (L) 21 - 32 mmol/L    Anion Gap 8 2 - 11 mmol/L    Glucose 109 (H) 65 - 100 mg/dL    BUN 10 8 - 23 MG/DL Creatinine 1.30 (H) 0.6 - 1.0 MG/DL    Est, Glom Filt Rate 46 (L) >60 ml/min/1.73m2    Calcium 9.1 8.3 - 10.4 MG/DL    Total Bilirubin 1.5 (H) 0.2 - 1.1 MG/DL    ALT 14 12 - 65 U/L    AST 26 15 - 37 U/L    Alk Phosphatase 81 50 - 136 U/L    Total Protein 8.3 (H) 6.3 - 8.2 g/dL    Albumin 2.8 (L) 3.2 - 4.6 g/dL    Globulin 5.5 (H) 2.8 - 4.5 g/dL    Albumin/Globulin Ratio 0.5 0.4 - 1.6     Lipase    Collection Time: 10/26/22 10:35 PM   Result Value Ref Range    Lipase 155 73 - 393 U/L   Troponin    Collection Time: 10/26/22 10:35 PM   Result Value Ref Range    Troponin, High Sensitivity 464.7 (HH) 0 - 14 pg/mL   Respiratory Panel, Molecular, with COVID-19 (Restricted: peds pts or suitable admitted adults)    Collection Time: 10/27/22 12:17 AM    Specimen: Nasopharyngeal   Result Value Ref Range    Adenovirus by PCR NOT DETECTED NOTDET      Coronavirus 229E by PCR NOT DETECTED NOTDET      Coronavirus HKU1 by PCR NOT DETECTED NOTDET      Coronavirus NL63 by PCR NOT DETECTED NOTDET      Coronavirus OC43 by PCR NOT DETECTED NOTDET      SARS-CoV-2, PCR NOT DETECTED NOTDET      Human Metapneumovirus by PCR NOT DETECTED NOTDET      Rhinovirus Enterovirus PCR Detected (A) NOTDET      Influenza A by PCR NOT DETECTED NOTDET      Influenza B PCR NOT DETECTED NOTDET      Parainfluenza 1 PCR NOT DETECTED NOTDET      Parainfluenza 2 PCR NOT DETECTED NOTDET      Parainfluenza 3 PCR NOT DETECTED NOTDET      Parainfluenza 4 PCR NOT DETECTED NOTDET      Respiratory Syncytial Virus by PCR NOT DETECTED NOTDET      Bordetella parapertussis by PCR NOT DETECTED NOTDET      Bordetella pertussis by PCR NOT DETECTED NOTDET      Chlamydophila Pneumonia PCR NOT DETECTED NOTDET      Mycoplasma pneumo by PCR NOT DETECTED NOTDET     TSH    Collection Time: 10/27/22 12:30 AM   Result Value Ref Range    TSH, 3RD GENERATION 2.990 0.358 - 3.740 uIU/mL   T4, Free    Collection Time: 10/27/22 12:30 AM   Result Value Ref Range    T4 Free 1.7 (H) 0.78 - 1.46 NG/DL   Ammonia    Collection Time: 10/27/22 12:52 AM   Result Value Ref Range    Ammonia 46 (H) 11 - 32 UMOL/L       I have personally reviewed imaging studies showing:  XR CHEST (2 VW)    Result Date: 10/27/2022  EXAM: XR CHEST (2 VW) HISTORY: fatigue/malaise. TECHNIQUE: Frontal and lateral chest. COMPARISON: 5/5/2017 FINDINGS: The cardiac silhouette, mediastinum, and pulmonary vasculature are within normal limits. There is no consolidation, pleural effusion, or pneumothorax. Mild blunting of the right costophrenic angle may be due to atelectasis. No significant osseous abnormalities are observed. Mild blunting of the right costophrenic angle may be due to atelectasis. Otherwise, no evidence of an acute intrathoracic process. Echocardiogram:  No results found for this or any previous visit. Orders Placed This Encounter   Medications    lactated ringers bolus    ketorolac (TORADOL) injection 15 mg    DISCONTD: pantoprazole (PROTONIX) 40 mg in sodium chloride (PF) 0.9 % 10 mL injection    niCARdipine (CARDENE) 25 mg in sodium chloride 0.9 % 250 mL infusion ([id number])     Order Specific Question:   Titrate Infusion? Answer:   Yes     Order Specific Question:   Initial Infusion Rate: Answer:   5 mg/hr     Order Specific Question:   Goal of Therapy is:      Answer:   SBP less than 160 mmHg     Order Specific Question:   Contact Provider if:     Answer:   Patient is receiving the maximum dose and is not achieving the goal of therapy    pantoprazole (PROTONIX) 40 mg in sodium chloride (PF) 0.9 % 10 mL injection    amLODIPine (NORVASC) tablet 10 mg    spironolactone (ALDACTONE) tablet 50 mg    losartan (COZAAR) tablet 50 mg    hydrOXYzine HCl (ATARAX) tablet 25 mg    sodium chloride flush 0.9 % injection 5-40 mL    sodium chloride flush 0.9 % injection 5-40 mL    0.9 % sodium chloride infusion    OR Linked Order Group     ondansetron (ZOFRAN-ODT) disintegrating tablet 4 mg     ondansetron [facility]) injection 4 mg    polyethylene glycol (GLYCOLAX) packet 17 g    aluminum & magnesium hydroxide-simethicone (MAALOX) 200-200-20 MG/5ML suspension 30 mL    OR Linked Order Group     acetaminophen (TYLENOL) tablet 650 mg     acetaminophen (TYLENOL) suppository 650 mg    DISCONTD: enoxaparin (LOVENOX) injection 40 mg     Order Specific Question:   Indication of Use     Answer:   Prophylaxis-DVT/PE    furosemide (LASIX) injection 40 mg    lactulose (CHRONULAC) 10 GM/15ML solution 20 g    cloNIDine (CATAPRES) tablet 0.1 mg    melatonin tablet 6 mg         Signed:  Sarkis Hammond MD    Part of this note may have been written by using a voice dictation software. The note has been proof read but may still contain some grammatical/other typographical errors.

## 2022-10-27 NOTE — ED NOTES
TRANSFER - OUT REPORT:    Verbal report given to [address] [place] [address] on Ben Chaidez  being transferred to  for routine progression of patient care       Report consisted of patient's Situation, Background, Assessment and   Recommendations(SBAR). Information from the following report(s) ED SBAR was reviewed with the receiving nurse. Lines:   Peripheral IV 10/27/22 Left Antecubital (Active)       Peripheral IV 10/27/22 Left Wrist (Active)        Opportunity for questions and clarification was provided.       Patient transported with:  Milad Ruth RN  10/27/22 6370

## 2022-10-27 NOTE — CONSULTS
Consult Note            Date:10/27/2022        Patient Case López     YOB: 1960     Age:62 y.o. Consults Gastroenterology    Chief Complaint     Chief Complaint   Patient presents with    Abdominal Pain     Epigastric and lower abdominal            History Obtained From   patient    History of Present Illness   Patient is a poor historian she was admitted through the emergency room complaining of severe abdominal discomfort. She is known to have cirrhosis. Ultrasound and CT imaging showed mild ascites. No other abnormality noted on her CT scan GI was consulted for possibility of endoscopic evaluation. Of note that his troponin is very elevated. HemoGlobin is 8.8. Total bilirubin is 1.5    Past Medical History     Past Medical History:   Diagnosis Date    Hypertension     Infectious disease     hep c        Past Surgical History     Past Surgical History:   Procedure Laterality Date    ORTHOPEDIC SURGERY      evie in right leg        Medications     Prior to Admission medications    Medication Sig Start Date End Date Taking?  Authorizing Provider   spironolactone (ALDACTONE) 50 MG tablet Take 50 mg by mouth daily   Yes Historical Provider, MD   losartan (COZAAR) 50 MG tablet Take 50 mg by mouth daily   Yes Historical Provider, MD   cholestyramine light (PREVALITE) 4 GM/DOSE powder Take by mouth 2 times daily (with meals)    Ar Automatic Reconciliation   ergocalciferol (ERGOCALCIFEROL) 1.25 MG (77733 UT) capsule Take 50,000 Units by mouth    Ar Automatic Reconciliation   hydrOXYzine (ATARAX) 25 MG tablet Take 25 mg by mouth every 4 hours as needed 9/10/12   Ar Automatic Reconciliation   magnesium oxide (MAG-OX) 400 MG tablet Take 400 mg by mouth 2 times daily 9/25/12   Ar Automatic Reconciliation   potassium chloride (KLOR-CON M) 20 MEQ extended release tablet Take 20 mEq by mouth daily 9/10/12   Ar Automatic Reconciliation   traMADol (ULTRAM) 50 MG tablet Take 50 mg by mouth every 6 hours 10/26/22  2235   WBC 5.1   RBC 2.78*   HGB 8.8*   HCT 26.5*   MCV 95.3   RDW 13.5        CHEMISTRIES:  Recent Labs     10/26/22  2235      K 3.6      CO2 20*   BUN 10   CREATININE 1.30*   GLUCOSE 109*     PT/INR:No results for input(s): PROTIME, INR in the last 72 hours. APTT:No results for input(s): APTT in the last 72 hours. LIVER PROFILE:  Recent Labs     10/26/22  2235   AST 26   ALT 14   BILITOT 1.5*   ALKPHOS 81       Imaging/Diagnostics   CT ABDOMEN PELVIS WO CONTRAST Additional Contrast? Oral    Result Date: 10/27/2022  1. Cirrhosis. Based on ultrasound findings, consider liver protocol CT/MRI with contrast to exclude a mass. 2.  Cholelithiasis. 3.  Mild ascites and small right pleural effusion. XR CHEST (2 VW)    Result Date: 10/27/2022  Mild blunting of the right costophrenic angle may be due to atelectasis. Otherwise, no evidence of an acute intrathoracic process. US ABDOMEN COMPLETE    Result Date: 10/27/2022  1. Cirrhotic appearance of the liver. Possible mass in the left lobe. Liver protocol CT/MRI is recommended. 2.  Cholelithiasis. 3.  Mild ascites. Assessment      Hospital Problems             Last Modified POA    * (Principal) Hypertensive urgency 10/27/2022 Yes    Hypertensive emergency 10/27/2022 Yes    Demand ischemia ([facility] [place] [address] 10/27/2022 Yes    Ascites 10/27/2022 Yes    QT prolongation 10/27/2022 Yes    Non-surgical epigastric abdominal pain 10/27/2022 Yes    Decompensated HCV cirrhosis ([address] 10/27/2022 Yes       Plan   1. EGD in a.m.     Electronically signed by Cassandra Silva MD on 10/27/22 at 4:51 PM EDT

## 2022-10-27 NOTE — ED TRIAGE NOTES
EMS: Patient arriving from home [facility]. Family sts for the past 1 week patient has been laying on the couch, fatigued. Patient has liver disease and her fluid has been building up on her stomach. Pt has had some shortness of breath. 109 HR, 96% on 2L (EMS dose), 203/130. Pt has not taken her medications recently. Pt family notes that they have all had the flu recently as well.

## 2022-10-27 NOTE — ACP (ADVANCE CARE PLANNING)
Advance Care Planning   Healthcare Decision Maker:  Primary decision maker daughter Clotilda Hashimoto [phone]    Click here to complete Healthcare Decision Makers including selection of the Healthcare Decision Maker Relationship (ie \"Primary\"). Today we documented Decision Maker(s) consistent with Legal Next of Kin hierarchy.

## 2022-10-27 NOTE — H&P
[facility] Cardiology Initial Cardiac Evaluation                 Date of  Admission: 10/26/2022 11:59 PM     Primary Care Physician: Tiana Jiménez. NYA De Guzman NP  Primary Cardiologist: None  Referring Physician: Dr Luisito Skinner  Attending Physician: Dr Rosa Weir    CC: htn      Gary Grajeda is a 58 y.o. female admitted for Hypertensive urgency [I16.0]. She has a h/o alcohol abuse and hep C w cirrhosis and h/o esophageal varices, anemia, traumatic brain injury in 2017 w subdural hematoma possibly secondary to seizure, and htn. It appears she is well known to GI with a long h/o non compliance, including /103 at f/u appt with GI and pt not taking her meds in 2019. Echo 2015 w EF 60-65% w mild-mod LAE, mild AR, trace MR and mild SD. She denies any CP, she has SOB only when her abdominal pain is severe, abdominal pain comes and goes, mild swelling in ankles but increased swelling in abdomen. Mild dizziness w standing but no syncope. She presented to the ER 10-26 w fatigue, increased abdominal ascites. Pt had not been taking her medications recently. /130. C/O epigastric pain 10/10. Hospitalist admitted, placed on cardene drip, given norvasc, lasix, cozaar. , K 3.6, cr 1.3, ammonia 46, HS trop 464, albumin 2.8, WBC 5.1, hgb 8.8, platelets 251. CXR mild blunting of R costophrenic angle. EKG ST w rate 101 w no ST changes and Qtc 552. GI consult pending. Cardene has been weaned and /81 to 173/95. She states she didn't take her meds bc she didn't have refills and didn't see her PCP until January. She doesn't eat much salt.      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23, no current alcohol use  FH: No CAD    Patient Active Problem List   Diagnosis    Thrombocytopenia, unspecified (HCC)    Hypomagnesemia    Hepatitis    Itching    Jaundice    HTN (hypertension)    Decompensated HCV cirrhosis ([facility] [address]    Hypertensive urgency    Demand ischemia (HCC)    Ascites    QT prolongation       Past Medical History:   Diagnosis Date    Hypertension     Infectious disease     hep c      Past Surgical History:   Procedure Laterality Date    ORTHOPEDIC SURGERY      evie in right leg     No Known Allergies   Family History   Problem Relation Age of Onset    Lung Disease Mother       Social History     Tobacco Use    Smoking status: Every Day     Packs/day: 2.00     Types: Cigarettes    Smokeless tobacco: Current   Substance Use Topics    Alcohol use: Yes        Current Facility-Administered Medications   Medication Dose Route Frequency    spironolactone (ALDACTONE) tablet 50 mg  50 mg Oral Daily    losartan (COZAAR) tablet 50 mg  50 mg Oral Daily    sodium chloride flush 0.9 % injection 5-40 mL  5-40 mL IntraVENous 2 times per day    sodium chloride flush 0.9 % injection 5-40 mL  5-40 mL IntraVENous PRN    0.9 % sodium chloride infusion   IntraVENous PRN    ondansetron (ZOFRAN-ODT) disintegrating tablet 4 mg  4 mg Oral Q8H PRN    Or    ondansetron (ZOFRAN) injection 4 mg  4 mg IntraVENous Q6H PRN    polyethylene glycol (GLYCOLAX) packet 17 g  17 g Oral Daily PRN    aluminum & magnesium hydroxide-simethicone (MAALOX) 200-200-20 MG/5ML suspension 30 mL  30 mL Oral Q6H PRN    acetaminophen (TYLENOL) tablet 650 mg  650 mg Oral Q6H PRN    Or    acetaminophen (TYLENOL) suppository 650 mg  650 mg Rectal Q6H PRN    furosemide (LASIX) injection 40 mg  40 mg IntraVENous Daily    lactulose (CHRONULAC) 10 GM/15ML solution 20 g  20 g Oral BID    cloNIDine (CATAPRES) tablet 0.1 mg  0.1 mg Oral Q4H PRN    melatonin tablet 6 mg  6 mg Oral Nightly PRN    hydrOXYzine HCl (ATARAX) tablet 25 mg  25 mg Oral Q6H PRN    diatrizoate meglumine-sodium (GASTROGRAFIN) 66-10 % solution 15 mL  15 mL Oral ONCE PRN     Current Outpatient Medications   Medication Sig    spironolactone (ALDACTONE) 50 MG tablet Take 50 mg by mouth daily losartan (COZAAR) 50 MG tablet Take 50 mg by mouth daily    cholestyramine light (PREVALITE) 4 GM/DOSE powder Take by mouth 2 times daily (with meals)    ergocalciferol (ERGOCALCIFEROL) 1.25 MG (85531 UT) capsule Take 50,000 Units by mouth    hydrOXYzine (ATARAX) 25 MG tablet Take 25 mg by mouth every 4 hours as needed    magnesium oxide (MAG-OX) 400 MG tablet Take 400 mg by mouth 2 times daily    potassium chloride (KLOR-CON M) 20 MEQ extended release tablet Take 20 mEq by mouth daily    traMADol (ULTRAM) 50 MG tablet Take 50 mg by mouth every 6 hours as needed. Review of Symptoms:  General: no weight change,  no weakness, fever or chills  Skin: no rashes, lumps, or other skin changes  HEENT: no headache, dizziness, lightheadedness, vision changes, hearing changes, tinnitus, vertigo, sinus pressure/pain, bleeding gums, sore throat, or hoarseness  Neck: no swollen glands, goiter, pain or stiffness  Respiratory: no cough, sputum, hemoptysis, mild dyspnea w abdominal pain, wheezing  Cardiovascular: + as per HPI  Gastrointestinal: + cirrhosis  Urinary: no frequency, urgency , hematuria, burning/pain with urination, recent flank pain, polyuria, nocturia, or difficulty urinating  Peripheral Vascular: no claudication, leg cramps, prior DVTs, swelling of calves, legs, or feet, color change, or swelling with redness or tenderness  Musculoskeletal: no muscle or joint pain/stiffness, joint swelling, erythema of joints, or back pain  Psychiatric: no depression or excessive stress  Neurological: no sensory or motor loss, seizures, syncope, tremors, numbness, no dementia  Hematologic:+ anemia  Endocrine: no thyroid problems, heat or cold intolerance, excessive sweating, polyuria, polydipsia, no diabetes.        Physical Exam  Vitals:    10/27/22 0616 10/27/22 0626 10/27/22 0646 10/27/22 0656   BP: (!) 151/95 (!) 145/96 (!) 173/95 (!) 162/98   Pulse: 88 91 94 91   Resp:       Temp:       TempSrc:       SpO2: 93% 96% 97% 97% Height:           Physical Exam:  General: Well Developed, Well Nourished, No Acute Distress  Head: normocephalic atraumatic   ENT: pupils equal and round, no abnormalities noted  Neck: supple, no JVD, no carotid bruits  Heart: S1S2 with RRR without murmurs or gallops  Lungs: Decreased   Abd: full, not tender at this time, not tympanic  Ext: warm, trace edema B  Skin: warm and dry  Psychiatric: Normal mood and affect, A&O x 3  Neurologic: normal muscle tone      Cardiographics    Telemetry: NSR rate around 90   ECG: ST w rate 101      Labs:   Recent Labs     10/26/22  2235      K 3.6   BUN 10   WBC 5.1   HGB 8.8*   HCT 26.5*           Assessment/Plan:     Assessment:   Hypertensive urgency- due to non compliance  - tapered off cardene  - cont norvasc, cozaar, adjust meds as needed    Demand ischemia (HCC)- elevated trop without any anginal symptoms, supply demand mismatch due to anemia, cirrhosis      Ascites- s/p IV lasix, per GI      QT prolongation- recheck EKG      Decompensated HCV cirrhosis (HCC)  - Gi consulted       Thank you very much for this referral. We appreciate the opportunity to participate in this patient's care. We will follow along with above stated plan.     SAI Marcus, PA-C  Consulting MD: Laly Rodriguez

## 2022-10-28 ENCOUNTER — ANESTHESIA (OUTPATIENT)
Dept: ENDOSCOPY | Age: 62
DRG: 384 | End: 2022-10-28
Payer: MEDICAID

## 2022-10-28 LAB
ALBUMIN SERPL-MCNC: 2.3 G/DL (ref 3.2–4.6)
ALBUMIN/GLOB SERPL: 0.5 {RATIO} (ref 0.4–1.6)
ALP SERPL-CCNC: 90 U/L (ref 50–136)
ALT SERPL-CCNC: 13 U/L (ref 12–65)
AMMONIA PLAS-SCNC: 38 UMOL/L (ref 11–32)
ANION GAP SERPL CALC-SCNC: 7 MMOL/L (ref 2–11)
AST SERPL-CCNC: 29 U/L (ref 15–37)
BASOPHILS # BLD: 0 K/UL (ref 0–0.2)
BASOPHILS NFR BLD: 1 % (ref 0–2)
BILIRUB SERPL-MCNC: 1 MG/DL (ref 0.2–1.1)
BUN SERPL-MCNC: 14 MG/DL (ref 8–23)
CALCIUM SERPL-MCNC: 8.9 MG/DL (ref 8.3–10.4)
CHLORIDE SERPL-SCNC: 112 MMOL/L (ref 101–110)
CO2 SERPL-SCNC: 23 MMOL/L (ref 21–32)
CREAT SERPL-MCNC: 1.2 MG/DL (ref 0.6–1)
DIFFERENTIAL METHOD BLD: ABNORMAL
EOSINOPHIL # BLD: 0.1 K/UL (ref 0–0.8)
EOSINOPHIL NFR BLD: 2 % (ref 0.5–7.8)
ERYTHROCYTE [DISTWIDTH] IN BLOOD BY AUTOMATED COUNT: 13.4 % (ref 11.9–14.6)
GLOBULIN SER CALC-MCNC: 4.4 G/DL (ref 2.8–4.5)
GLUCOSE SERPL-MCNC: 85 MG/DL (ref 65–100)
HCT VFR BLD AUTO: 26.1 % (ref 35.8–46.3)
HGB BLD-MCNC: 8.5 G/DL (ref 11.7–15.4)
IMM GRANULOCYTES # BLD AUTO: 0 K/UL (ref 0–0.5)
IMM GRANULOCYTES NFR BLD AUTO: 0 % (ref 0–5)
LYMPHOCYTES # BLD: 0.9 K/UL (ref 0.5–4.6)
LYMPHOCYTES NFR BLD: 22 % (ref 13–44)
MCH RBC QN AUTO: 30.9 PG (ref 26.1–32.9)
MCHC RBC AUTO-ENTMCNC: 32.6 G/DL (ref 31.4–35)
MCV RBC AUTO: 94.9 FL (ref 82–102)
MONOCYTES # BLD: 0.5 K/UL (ref 0.1–1.3)
MONOCYTES NFR BLD: 13 % (ref 4–12)
NEUTS SEG # BLD: 2.5 K/UL (ref 1.7–8.2)
NEUTS SEG NFR BLD: 62 % (ref 43–78)
NRBC # BLD: 0 K/UL (ref 0–0.2)
PLATELET # BLD AUTO: 135 K/UL (ref 150–450)
PMV BLD AUTO: 11.4 FL (ref 9.4–12.3)
POTASSIUM SERPL-SCNC: 3.7 MMOL/L (ref 3.5–5.1)
PROT SERPL-MCNC: 6.7 G/DL (ref 6.3–8.2)
RBC # BLD AUTO: 2.75 M/UL (ref 4.05–5.2)
SODIUM SERPL-SCNC: 142 MMOL/L (ref 133–143)
WBC # BLD AUTO: 4 K/UL (ref 4.3–11.1)

## 2022-10-28 PROCEDURE — 82140 ASSAY OF AMMONIA: CPT

## 2022-10-28 PROCEDURE — 80053 COMPREHEN METABOLIC PANEL: CPT

## 2022-10-28 PROCEDURE — 2580000003 HC RX 258: Performed by: ANESTHESIOLOGY

## 2022-10-28 PROCEDURE — 1100000000 HC RM PRIVATE

## 2022-10-28 PROCEDURE — 94640 AIRWAY INHALATION TREATMENT: CPT

## 2022-10-28 PROCEDURE — 36415 COLL VENOUS BLD VENIPUNCTURE: CPT

## 2022-10-28 PROCEDURE — 2500000003 HC RX 250 WO HCPCS: Performed by: ANESTHESIOLOGY

## 2022-10-28 PROCEDURE — 88305 TISSUE EXAM BY PATHOLOGIST: CPT

## 2022-10-28 PROCEDURE — 6360000002 HC RX W HCPCS: Performed by: PHYSICIAN ASSISTANT

## 2022-10-28 PROCEDURE — 6370000000 HC RX 637 (ALT 250 FOR IP): Performed by: HOSPITALIST

## 2022-10-28 PROCEDURE — 6370000000 HC RX 637 (ALT 250 FOR IP): Performed by: FAMILY MEDICINE

## 2022-10-28 PROCEDURE — 6370000000 HC RX 637 (ALT 250 FOR IP): Performed by: PHYSICIAN ASSISTANT

## 2022-10-28 PROCEDURE — 85025 COMPLETE CBC W/AUTO DIFF WBC: CPT

## 2022-10-28 PROCEDURE — 1100000003 HC PRIVATE W/ TELEMETRY

## 2022-10-28 PROCEDURE — 3609012400 HC EGD TRANSORAL BIOPSY SINGLE/MULTIPLE: Performed by: INTERNAL MEDICINE

## 2022-10-28 PROCEDURE — 88312 SPECIAL STAINS GROUP 1: CPT

## 2022-10-28 PROCEDURE — 0DB78ZX EXCISION OF STOMACH, PYLORUS, VIA NATURAL OR ARTIFICIAL OPENING ENDOSCOPIC, DIAGNOSTIC: ICD-10-PCS | Performed by: INTERNAL MEDICINE

## 2022-10-28 PROCEDURE — 3700000000 HC ANESTHESIA ATTENDED CARE: Performed by: INTERNAL MEDICINE

## 2022-10-28 PROCEDURE — 2709999900 HC NON-CHARGEABLE SUPPLY: Performed by: INTERNAL MEDICINE

## 2022-10-28 PROCEDURE — 7100000011 HC PHASE II RECOVERY - ADDTL 15 MIN: Performed by: INTERNAL MEDICINE

## 2022-10-28 PROCEDURE — 6360000002 HC RX W HCPCS: Performed by: NURSE ANESTHETIST, CERTIFIED REGISTERED

## 2022-10-28 PROCEDURE — 7100000010 HC PHASE II RECOVERY - FIRST 15 MIN: Performed by: INTERNAL MEDICINE

## 2022-10-28 PROCEDURE — 94760 N-INVAS EAR/PLS OXIMETRY 1: CPT

## 2022-10-28 PROCEDURE — 2580000003 HC RX 258: Performed by: HOSPITALIST

## 2022-10-28 PROCEDURE — 6360000002 HC RX W HCPCS: Performed by: HOSPITALIST

## 2022-10-28 PROCEDURE — 6360000002 HC RX W HCPCS: Performed by: FAMILY MEDICINE

## 2022-10-28 RX ORDER — SODIUM CHLORIDE 9 MG/ML
INJECTION, SOLUTION INTRAVENOUS PRN
Status: DISCONTINUED | OUTPATIENT
Start: 2022-10-28 | End: 2022-10-28 | Stop reason: HOSPADM

## 2022-10-28 RX ORDER — LORAZEPAM 2 MG/ML
2 INJECTION INTRAMUSCULAR EVERY 4 HOURS PRN
Status: DISCONTINUED | OUTPATIENT
Start: 2022-10-28 | End: 2022-10-31 | Stop reason: HOSPADM

## 2022-10-28 RX ORDER — LOSARTAN POTASSIUM 50 MG/1
50 TABLET ORAL ONCE
Status: COMPLETED | OUTPATIENT
Start: 2022-10-28 | End: 2022-10-28

## 2022-10-28 RX ORDER — LOSARTAN POTASSIUM 50 MG/1
100 TABLET ORAL DAILY
Status: DISCONTINUED | OUTPATIENT
Start: 2022-10-29 | End: 2022-10-31 | Stop reason: HOSPADM

## 2022-10-28 RX ORDER — SODIUM CHLORIDE 0.9 % (FLUSH) 0.9 %
5-40 SYRINGE (ML) INJECTION EVERY 12 HOURS SCHEDULED
Status: DISCONTINUED | OUTPATIENT
Start: 2022-10-28 | End: 2022-10-28 | Stop reason: HOSPADM

## 2022-10-28 RX ORDER — METHYLPREDNISOLONE SODIUM SUCCINATE 125 MG/2ML
125 INJECTION, POWDER, LYOPHILIZED, FOR SOLUTION INTRAMUSCULAR; INTRAVENOUS ONCE
Status: COMPLETED | OUTPATIENT
Start: 2022-10-28 | End: 2022-10-28

## 2022-10-28 RX ORDER — PANTOPRAZOLE SODIUM 40 MG/1
40 TABLET, DELAYED RELEASE ORAL
Status: DISCONTINUED | OUTPATIENT
Start: 2022-10-28 | End: 2022-10-31 | Stop reason: HOSPADM

## 2022-10-28 RX ORDER — BUDESONIDE 0.5 MG/2ML
0.5 INHALANT ORAL 2 TIMES DAILY
Status: DISCONTINUED | OUTPATIENT
Start: 2022-10-28 | End: 2022-10-30

## 2022-10-28 RX ORDER — ARFORMOTEROL TARTRATE 15 UG/2ML
15 SOLUTION RESPIRATORY (INHALATION) 2 TIMES DAILY
Status: DISCONTINUED | OUTPATIENT
Start: 2022-10-28 | End: 2022-10-30

## 2022-10-28 RX ORDER — PROPOFOL 10 MG/ML
INJECTION, EMULSION INTRAVENOUS PRN
Status: DISCONTINUED | OUTPATIENT
Start: 2022-10-28 | End: 2022-10-28 | Stop reason: SDUPTHER

## 2022-10-28 RX ORDER — SODIUM CHLORIDE, SODIUM LACTATE, POTASSIUM CHLORIDE, CALCIUM CHLORIDE 600; 310; 30; 20 MG/100ML; MG/100ML; MG/100ML; MG/100ML
INJECTION, SOLUTION INTRAVENOUS CONTINUOUS
Status: DISCONTINUED | OUTPATIENT
Start: 2022-10-28 | End: 2022-10-29

## 2022-10-28 RX ORDER — LIDOCAINE HYDROCHLORIDE 10 MG/ML
1 INJECTION, SOLUTION INFILTRATION; PERINEURAL
Status: COMPLETED | OUTPATIENT
Start: 2022-10-28 | End: 2022-10-28

## 2022-10-28 RX ORDER — SODIUM CHLORIDE 0.9 % (FLUSH) 0.9 %
5-40 SYRINGE (ML) INJECTION PRN
Status: DISCONTINUED | OUTPATIENT
Start: 2022-10-28 | End: 2022-10-28 | Stop reason: HOSPADM

## 2022-10-28 RX ORDER — NICOTINE 21 MG/24HR
1 PATCH, TRANSDERMAL 24 HOURS TRANSDERMAL DAILY
Status: DISCONTINUED | OUTPATIENT
Start: 2022-10-28 | End: 2022-10-31 | Stop reason: HOSPADM

## 2022-10-28 RX ADMIN — PROPOFOL 50 MG: 10 INJECTION, EMULSION INTRAVENOUS at 12:24

## 2022-10-28 RX ADMIN — BUDESONIDE 500 MCG: 0.5 INHALANT RESPIRATORY (INHALATION) at 19:35

## 2022-10-28 RX ADMIN — LIDOCAINE HYDROCHLORIDE 5 ML: 10 INJECTION, SOLUTION INFILTRATION; PERINEURAL at 12:20

## 2022-10-28 RX ADMIN — SODIUM CHLORIDE, PRESERVATIVE FREE 10 ML: 5 INJECTION INTRAVENOUS at 08:04

## 2022-10-28 RX ADMIN — LOSARTAN POTASSIUM 50 MG: 50 TABLET, FILM COATED ORAL at 17:47

## 2022-10-28 RX ADMIN — LACTULOSE 20 G: 20 SOLUTION ORAL at 21:24

## 2022-10-28 RX ADMIN — LEVETIRACETAM 750 MG: 500 TABLET, FILM COATED ORAL at 08:02

## 2022-10-28 RX ADMIN — HEPARIN SODIUM 5000 UNITS: 5000 INJECTION INTRAVENOUS; SUBCUTANEOUS at 14:12

## 2022-10-28 RX ADMIN — SODIUM CHLORIDE, PRESERVATIVE FREE 10 ML: 5 INJECTION INTRAVENOUS at 21:24

## 2022-10-28 RX ADMIN — LOSARTAN POTASSIUM 50 MG: 50 TABLET, FILM COATED ORAL at 08:03

## 2022-10-28 RX ADMIN — SPIRONOLACTONE 50 MG: 25 TABLET ORAL at 08:02

## 2022-10-28 RX ADMIN — FUROSEMIDE 40 MG: 10 INJECTION, SOLUTION INTRAMUSCULAR; INTRAVENOUS at 08:03

## 2022-10-28 RX ADMIN — METHYLPREDNISOLONE SODIUM SUCCINATE 125 MG: 125 INJECTION, POWDER, FOR SOLUTION INTRAMUSCULAR; INTRAVENOUS at 17:48

## 2022-10-28 RX ADMIN — ARFORMOTEROL TARTRATE 15 MCG: 15 SOLUTION RESPIRATORY (INHALATION) at 19:35

## 2022-10-28 RX ADMIN — HEPARIN SODIUM 5000 UNITS: 5000 INJECTION INTRAVENOUS; SUBCUTANEOUS at 21:24

## 2022-10-28 RX ADMIN — LEVETIRACETAM 750 MG: 500 TABLET, FILM COATED ORAL at 21:24

## 2022-10-28 RX ADMIN — SODIUM CHLORIDE, POTASSIUM CHLORIDE, SODIUM LACTATE AND CALCIUM CHLORIDE: 600; 310; 30; 20 INJECTION, SOLUTION INTRAVENOUS at 11:55

## 2022-10-28 RX ADMIN — PROPOFOL 50 MG: 10 INJECTION, EMULSION INTRAVENOUS at 12:21

## 2022-10-28 RX ADMIN — PANTOPRAZOLE SODIUM 40 MG: 40 TABLET, DELAYED RELEASE ORAL at 17:48

## 2022-10-28 RX ADMIN — PROPOFOL 50 MG: 10 INJECTION, EMULSION INTRAVENOUS at 12:20

## 2022-10-28 ASSESSMENT — PAIN SCALES - GENERAL
PAINLEVEL_OUTOF10: 0

## 2022-10-28 ASSESSMENT — LIFESTYLE VARIABLES: SMOKING_STATUS: 1

## 2022-10-28 ASSESSMENT — PAIN - FUNCTIONAL ASSESSMENT: PAIN_FUNCTIONAL_ASSESSMENT: NONE - DENIES PAIN

## 2022-10-28 NOTE — ANESTHESIA POSTPROCEDURE EVALUATION
Department of Anesthesiology  Postprocedure Note    Patient: Diego Marie  MRN: [id number]  YOB: 1960  Date of evaluation: 10/28/2022      Procedure Summary     Date: 10/28/22 Room / Location: [facility] ENDO 05 / [facility] ENDOSCOPY    Anesthesia Start: 1214 Anesthesia Stop: 3407    Procedure: EGD BIOPSY (Upper GI Region) Diagnosis:       Epigastric pain      (Epigastric pain [R10.13])    Surgeons: Mehul Akers MD Responsible Provider: Harriett Chan MD    Anesthesia Type: TIVA ASA Status: 3          Anesthesia Type: No value filed. José Miguel Phase I: José Miguel Score: 10    José Miguel Phase II: José Miguel Score: 10      Anesthesia Post Evaluation    Patient location during evaluation: PACU  Patient participation: complete - patient participated  Level of consciousness: awake and alert  Airway patency: patent  Nausea: well controlled. Complications: no  Cardiovascular status: acceptable.   Respiratory status: acceptable and nasal cannula  Hydration status: stable

## 2022-10-28 NOTE — PROCEDURES
Endoscopy Note          Operative Report    Patient: Sabina Tellez MRN: [id number]      YOB: 1960  Age: 58 y.o. Sex: female            Indications:   Epigastric abdominal pain    Preoperative Evaluation: The patient was evaluated prior to the procedure in the GI lab admission area, the patient ASA was recorded . Consent was obtained from the patient with the risk of perforation bleeding and aspiration. Anesthesia: FLOR-per anesthesia    Findings: Normal upper mid and distal esophageal mucosa. Severe erosive antral gastritis with shallow ulceration mostly in the antrum. Biopsy from the antrum was taken. Open pylorus.   Normal descending duodenal mucosa    Postoperative Diagnosis: Acute gastric ulcer nonbleeding    62139 Esophagogastroduodenoscopy, Flexible, transoral; biopsy; single or multiple      Recommendations: Await Pathology      Signed By:  Katelyn Salvador MD     October 28, 2022

## 2022-10-28 NOTE — CARE COORDINATION
CM continuing to follow. Patient had EGD today. No anticipated CM needs at discharge. Please consult CM if discharge needs identified.

## 2022-10-28 NOTE — PROGRESS NOTES
Hospitalist Progress Note   Admit Date:  10/26/2022 11:59 PM   Name:  Angelo Palacios   Age:  58 y.o. Sex:  female  :  1960   MRN:  [id number]   Room:  [facility]/01    Presenting Complaint: Abdominal Pain (Epigastric and lower abdominal/)     Reason(s) for Admission: Demand ischemia [facility]) [I24.8]  Hypertensive urgency [I16.0]  Hypertensive emergency [I16.1]     Hospital Course:   Angelo Palacios is a 58 y.o. female with medical history of hypertension, seizure disorder, noncompliance, who presented to triage in mild distress. Patient st she has been feeling bad for about a week. She has lethargy and weakness. Pt has abdominal pain, localized to epigastric region and lower abdomen as well as a cough. Pt expresses that she has had chills at home but no fever. Pt sts she has had nothing but ginger ale and water to drink. Pt cough is junky. Patient's primary complaint is epigastric abdominal pain. She states its been coming and going for better part of a week. She describes it as burning sensation in the epigastric region and nonradiating. No increasing or decreasing factors are noted. She states the only time it seems to go away as if she can get to sleep. She reports having been laying on the couch just all day today. She denies any fever or chills. She does report one episode of vomiting but denies any diarrhea or constipation. She rated the pain as 10/10 in intensity. Initial blood pressure in the emergency room was 208/133. She was placed on a Cardene drip and hospitalist asked to admit. Labs show a hemoglobin of 8.8. Ammonia level 46 and troponin is 464.7. Subjective & 24hr Events (10/28/22): \"I know I should take my medicines. \"  Pleasant 62y. o. AA female sitting up in bed in NAD    Admits to increased congestion with wheezing; denies abd pain this AM without N/V, HA, chest pain or dyspnea.       Assessment & Plan:     Principal Problem:    Hypertensive urgency / emergency  - off cardene gtt  - increase losartan to 100mg daily  - cont lasix / aldactone    Active Problems:    Demand ischemia / abnormal cardiac enzymes  - appreciate cardiology consult note 10/27/2022  - recommending echo but likely etiology is demand ischemia / hypertensive urgency, not NSTEMI  - echo results with preserved EF, no significant findings  - troponin trending down      Abdominal pain  - appreciate GI eval  - s/p EGD today with finding of acute gastric non-bleeding ulcer, path sent  - start PPI bid      Acute COPD exacerbation  - active smoker with 86pack year hx (2ppd x 43 years)  - start [address]  - nicotine pathc ordered, smoking cessation counseled       HCV / ascites / hyperammoniemia   - cont diuretics  - cont lactulose  - ongoing mgmt per GI      Seizure d/o  - cont keppra as dosed  - seizure precautions; prn ativan      Anticipated discharge needs:    - pending clinical course    Diet:  ADULT DIET; Regular  DVT PPx: hep sc  Code status: Full Code    Hospital Problems:  Principal Problem:    Hypertensive urgency  Active Problems:    Hypertensive emergency    Demand ischemia (HCC)    Ascites    QT prolongation    Non-surgical epigastric abdominal pain    Decompensated HCV cirrhosis ([facility] [address]  Resolved Problems:    * No resolved hospital problems.  *      Objective:   Patient Vitals for the past 24 hrs:   Temp Pulse Resp BP SpO2   10/28/22 1521 98.6 °F (37 °C) 64 18 (!) 168/90 98 %   10/28/22 1416 -- 77 -- -- 100 %   10/28/22 1326 98.8 °F (37.1 °C) 64 18 (!) 167/89 97 %   10/28/22 1301 -- 63 19 (!) 170/98 97 %   10/28/22 1252 -- 65 20 (!) 169/104 100 %   10/28/22 1245 -- 88 20 (!) 189/97 100 %   10/28/22 1232 -- 68 21 115/72 94 %   10/28/22 1150 98.2 °F (36.8 °C) 64 18 (!) 193/100 97 %   10/28/22 1118 98.2 °F (36.8 °C) 73 18 (!) 185/105 98 %   10/28/22 0706 99 °F (37.2 °C) 74 18 (!) 165/94 99 %   10/28/22 0335 98.6 °F (37 °C) 76 18 (!) 158/97 97 %   10/27/22 2345 98.2 °F (36.8 °C) 73 18 (!) 142/96 98 % 10/27/22 2026 98.8 °F (37.1 °C) 75 18 (!) 155/94 97 %   10/27/22 1830 98.5 °F (36.9 °C) -- -- -- --   10/27/22 1715 -- 78 25 (!) 160/98 95 %   10/27/22 1645 -- 73 21 (!) 153/93 95 %       Oxygen Therapy  SpO2: 98 %  Pulse via Oximetry: 64 beats per minute  Pulse Oximeter Device Mode: Continuous  Pulse Oximeter Device Location: Right, Hand  O2 Device: None (Room air)  O2 Flow Rate (L/min): 2 L/min    Estimated body mass index is 24.67 kg/m² as calculated from the following:    Height as of this encounter: 5' 7\" (1.702 m). Weight as of this encounter: 157 lb 8 oz (71.4 kg). Intake/Output Summary (Last 24 hours) at 10/28/2022 1613  Last data filed at 10/28/2022 1215  Gross per 24 hour   Intake --   Output 0 ml   Net 0 ml         Physical Exam:     Blood pressure (!) 168/90, pulse 64, temperature 98.6 °F (37 °C), temperature source Oral, resp. rate 18, height 5' 7\" (1.702 m), weight 157 lb 8 oz (71.4 kg), SpO2 98 %. General:    Well nourished. Head:  Normocephalic, atraumatic  Eyes:  Sclerae appear normal.  Pupils equally round. ENT:  Nares appear normal, no drainage. Moist oral mucosa  Neck:  No restricted ROM. Trachea midline   CV:   RRR. No m/r/g. No jugular venous distension. Lungs:   Rhonchi / wheezing b/l; no accessory muscles used  Abdomen: Bowel sounds present. Soft, nontender, nondistended. Extremities: + mvmt x 4  Skin:     No rashes and normal coloration. Warm and dry. Neuro:  CN II-XII grossly intact. Sensation intact. A&Ox3  Psych:  Normal mood and affect.       I have personally reviewed labs and tests showing:  Recent Labs:  Recent Results (from the past 48 hour(s))   EKG 12 Lead (Select if Upper Abd Pain, or SOB, Diaphoresis or Tachy)    Collection Time: 10/26/22 10:16 PM   Result Value Ref Range    Ventricular Rate 101 BPM    Atrial Rate 101 BPM    P-R Interval 152 ms    QRS Duration 78 ms    Q-T Interval 426 ms    QTc Calculation (Bazett) 552 ms    P Axis 46 degrees    R Axis 26 degrees    T Axis 42 degrees    Diagnosis Sinus tachycardia    CBC with Diff    Collection Time: 10/26/22 10:35 PM   Result Value Ref Range    WBC 5.1 4.3 - 11.1 K/uL    RBC 2.78 (L) 4.05 - 5.2 M/uL    Hemoglobin 8.8 (L) 11.7 - 15.4 g/dL    Hematocrit 26.5 (L) 35.8 - 46.3 %    MCV 95.3 82 - 102 FL    MCH 31.7 26.1 - 32.9 PG    MCHC 33.2 31.4 - 35.0 g/dL    RDW 13.5 11.9 - 14.6 %    Platelets 234 812 - 176 K/uL    MPV 10.8 9.4 - 12.3 FL    nRBC 0.00 0.0 - 0.2 K/uL    Differential Type AUTOMATED      Seg Neutrophils 79 (H) 43 - 78 %    Lymphocytes 14 13 - 44 %    Monocytes 6 4.0 - 12.0 %    Eosinophils % 0 (L) 0.5 - 7.8 %    Basophils 0 0.0 - 2.0 %    Immature Granulocytes 1 0.0 - 5.0 %    Segs Absolute 4.0 1.7 - 8.2 K/UL    Absolute Lymph # 0.7 0.5 - 4.6 K/UL    Absolute Mono # 0.3 0.1 - 1.3 K/UL    Absolute Eos # 0.0 0.0 - 0.8 K/UL    Basophils Absolute 0.0 0.0 - 0.2 K/UL    Absolute Immature Granulocyte 0.0 0.0 - 0.5 K/UL   CMP    Collection Time: 10/26/22 10:35 PM   Result Value Ref Range    Sodium 138 133 - 143 mmol/L    Potassium 3.6 3.5 - 5.1 mmol/L    Chloride 110 101 - 110 mmol/L    CO2 20 (L) 21 - 32 mmol/L    Anion Gap 8 2 - 11 mmol/L    Glucose 109 (H) 65 - 100 mg/dL    BUN 10 8 - 23 MG/DL    Creatinine 1.30 (H) 0.6 - 1.0 MG/DL    Est, Glom Filt Rate 46 (L) >60 ml/min/1.73m2    Calcium 9.1 8.3 - 10.4 MG/DL    Total Bilirubin 1.5 (H) 0.2 - 1.1 MG/DL    ALT 14 12 - 65 U/L    AST 26 15 - 37 U/L    Alk Phosphatase 81 50 - 136 U/L    Total Protein 8.3 (H) 6.3 - 8.2 g/dL    Albumin 2.8 (L) 3.2 - 4.6 g/dL    Globulin 5.5 (H) 2.8 - 4.5 g/dL    Albumin/Globulin Ratio 0.5 0.4 - 1.6     Lipase    Collection Time: 10/26/22 10:35 PM   Result Value Ref Range    Lipase 155 73 - 393 U/L   Troponin    Collection Time: 10/26/22 10:35 PM   Result Value Ref Range    Troponin, High Sensitivity 464.7 (HH) 0 - 14 pg/mL   Respiratory Panel, Molecular, with COVID-19 (Restricted: peds pts or suitable admitted adults) Collection Time: 10/27/22 12:17 AM    Specimen: Nasopharyngeal   Result Value Ref Range    Adenovirus by PCR NOT DETECTED NOTDET      Coronavirus 229E by PCR NOT DETECTED NOTDET      Coronavirus HKU1 by PCR NOT DETECTED NOTDET      Coronavirus NL63 by PCR NOT DETECTED NOTDET      Coronavirus OC43 by PCR NOT DETECTED NOTDET      SARS-CoV-2, PCR NOT DETECTED NOTDET      Human Metapneumovirus by PCR NOT DETECTED NOTDET      Rhinovirus Enterovirus PCR Detected (A) NOTDET      Influenza A by PCR NOT DETECTED NOTDET      Influenza B PCR NOT DETECTED NOTDET      Parainfluenza 1 PCR NOT DETECTED NOTDET      Parainfluenza 2 PCR NOT DETECTED NOTDET      Parainfluenza 3 PCR NOT DETECTED NOTDET      Parainfluenza 4 PCR NOT DETECTED NOTDET      Respiratory Syncytial Virus by PCR NOT DETECTED NOTDET      Bordetella parapertussis by PCR NOT DETECTED NOTDET      Bordetella pertussis by PCR NOT DETECTED NOTDET      Chlamydophila Pneumonia PCR NOT DETECTED NOTDET      Mycoplasma pneumo by PCR NOT DETECTED NOTDET     TSH    Collection Time: 10/27/22 12:30 AM   Result Value Ref Range    TSH, 3RD GENERATION 2.990 0.358 - 3.740 uIU/mL   T4, Free    Collection Time: 10/27/22 12:30 AM   Result Value Ref Range    T4 Free 1.7 (H) 0.78 - 1.46 NG/DL   Ammonia    Collection Time: 10/27/22 12:52 AM   Result Value Ref Range    Ammonia 46 (H) 11 - 32 UMOL/L   Troponin    Collection Time: 10/27/22  9:31 AM   Result Value Ref Range    Troponin, High Sensitivity 335.2 (HH) 0 - 14 pg/mL   Transthoracic echocardiogram (TTE) complete with contrast, bubble, strain, and 3D PRN    Collection Time: 10/27/22  3:06 PM   Result Value Ref Range    LV EDV A2C 88 mL    LV EDV A4C 98 mL    LV ESV A2C 35 mL    LV ESV A4C 47 mL    IVSd 1.1 (A) 0.6 - 0.9 cm    LVIDd 4.2 3.9 - 5.3 cm    LVIDs 2.8 cm    LVOT Diameter 1.8 cm    LVOT Mean Gradient 3 mmHg    LVOT VTI 23.5 cm    LVOT Peak Velocity 1.2 m/s    LVOT Peak Gradient 6 mmHg    LVPWd 1.1 (A) 0.6 - 0.9 cm    LV E' Lateral Velocity 6 cm/s    LV E' Septal Velocity 5 cm/s    LV Ejection Fraction A2C 60 %    LV Ejection Fraction A4C 52 %    EF BP 56 55 - 100 %    LVOT Area 2.5 cm2    LVOT SV 59.8 ml    LA Minor Axis 5.9 cm    LA Major Axis 5.4 cm    LA Area 2C 17.3 cm2    LA Area 4C 18.5 cm2    LA Volume BP 48 22 - 52 mL    LA Diameter 4.0 cm    AR .0 ms    AR Max Velocity PISA 3.7 m/s    AV Peak Velocity 1.5 m/s    AV Peak Gradient 9 mmHg    AV Mean Velocity 1.0 m/s    AV Mean Gradient 4 mmHg    AV VTI 26.9 cm    AV Area by VTI 2.2 cm2    AV Area by Peak Velocity 2.0 cm2    Aortic Root 3.0 cm    Ascending Aorta 3.2 cm    IVC Proxmal 1.5 cm    MV E Wave Deceleration Time 246.0 ms    MV A Velocity 1.03 m/s    MV E Velocity 0.79 m/s    MV Mean Gradient 2 mmHg    MV VTI 30.5 cm    MV Mean Velocity 0.6 m/s    MV Max Velocity 1.1 m/s    MV Peak Gradient 4 mmHg    MV Area by VTI 2.0 cm2    PV .0 ms    PV Max Velocity 0.9 m/s    PV Peak Gradient 3 mmHg    RVIDd 3.5 cm    RV Basal Dimension 4.0 cm    TAPSE 2.5 1.7 cm    TR Max Velocity 2.21 m/s    TR Peak Gradient 20 mmHg    Fractional Shortening 2D 33 28 - 44 %    LV RWT Ratio 0.52     LV Mass 2D 157.1 67 - 162 g    MV E/A 0.77     E/E' Ratio (Averaged) 14.48     E/E' Lateral 13.17     E/E' Septal 15.80     LA/AO Root Ratio 1.33     AV Velocity Ratio 0.80     LVOT:AV VTI Index 0.87     MV:LVOT VTI Index 1.30    CBC with Auto Differential    Collection Time: 10/28/22  5:05 AM   Result Value Ref Range    WBC 4.0 (L) 4.3 - 11.1 K/uL    RBC 2.75 (L) 4.05 - 5.2 M/uL    Hemoglobin 8.5 (L) 11.7 - 15.4 g/dL    Hematocrit 26.1 (L) 35.8 - 46.3 %    MCV 94.9 82 - 102 FL    MCH 30.9 26.1 - 32.9 PG    MCHC 32.6 31.4 - 35.0 g/dL    RDW 13.4 11.9 - 14.6 %    Platelets 374 (L) 193 - 450 K/uL    MPV 11.4 9.4 - 12.3 FL    nRBC 0.00 0.0 - 0.2 K/uL    Differential Type AUTOMATED      Seg Neutrophils 62 43 - 78 %    Lymphocytes 22 13 - 44 %    Monocytes 13 (H) 4.0 - 12.0 %    Eosinophils % 2 0.5 - 7.8 %    Basophils 1 0.0 - 2.0 %    Immature Granulocytes 0 0.0 - 5.0 %    Segs Absolute 2.5 1.7 - 8.2 K/UL    Absolute Lymph # 0.9 0.5 - 4.6 K/UL    Absolute Mono # 0.5 0.1 - 1.3 K/UL    Absolute Eos # 0.1 0.0 - 0.8 K/UL    Basophils Absolute 0.0 0.0 - 0.2 K/UL    Absolute Immature Granulocyte 0.0 0.0 - 0.5 K/UL   Comprehensive Metabolic Panel w/ Reflex to MG    Collection Time: 10/28/22  5:05 AM   Result Value Ref Range    Sodium 142 133 - 143 mmol/L    Potassium 3.7 3.5 - 5.1 mmol/L    Chloride 112 (H) 101 - 110 mmol/L    CO2 23 21 - 32 mmol/L    Anion Gap 7 2 - 11 mmol/L    Glucose 85 65 - 100 mg/dL    BUN 14 8 - 23 MG/DL    Creatinine 1.20 (H) 0.6 - 1.0 MG/DL    Est, Glom Filt Rate 51 (L) >60 ml/min/1.73m2    Calcium 8.9 8.3 - 10.4 MG/DL    Total Bilirubin 1.0 0.2 - 1.1 MG/DL    ALT 13 12 - 65 U/L    AST 29 15 - 37 U/L    Alk Phosphatase 90 50 - 136 U/L    Total Protein 6.7 6.3 - 8.2 g/dL    Albumin 2.3 (L) 3.2 - 4.6 g/dL    Globulin 4.4 2.8 - 4.5 g/dL    Albumin/Globulin Ratio 0.5 0.4 - 1.6     Ammonia    Collection Time: 10/28/22  5:05 AM   Result Value Ref Range    Ammonia 38 (H) 11 - 32 UMOL/L       I have personally reviewed imaging studies showing: Other Studies:  CT ABDOMEN PELVIS WO CONTRAST Additional Contrast? Oral   Final Result   1. Cirrhosis. Based on ultrasound findings, consider liver protocol CT/MRI   with contrast to exclude a mass. 2.  Cholelithiasis. 3.  Mild ascites and small right pleural effusion. US ABDOMEN COMPLETE   Final Result   1. Cirrhotic appearance of the liver. Possible mass in the left lobe. Liver   protocol CT/MRI is recommended. 2.  Cholelithiasis. 3.  Mild ascites. XR CHEST (2 VW)   Final Result   Mild blunting of the right costophrenic angle may be due to atelectasis. Otherwise, no evidence of an acute intrathoracic process.              Current Meds:  Current Facility-Administered Medications   Medication Dose Route Frequency lactated ringers infusion   IntraVENous Continuous    methylPREDNISolone sodium (SOLU-MEDROL) injection 125 mg  125 mg IntraVENous Once    Arformoterol Tartrate (BROVANA) nebulizer solution 15 mcg  15 mcg Nebulization BID    budesonide (PULMICORT) nebulizer suspension 500 mcg  0.5 mg Nebulization BID    tiotropium (SPIRIVA RESPIMAT) 2.5 MCG/ACT inhaler 2 puff  2 puff Inhalation Daily    [START ON 10/29/2022] losartan (COZAAR) tablet 100 mg  100 mg Oral Daily    losartan (COZAAR) tablet 50 mg  50 mg Oral Once    spironolactone (ALDACTONE) tablet 50 mg  50 mg Oral Daily    sodium chloride flush 0.9 % injection 5-40 mL  5-40 mL IntraVENous 2 times per day    sodium chloride flush 0.9 % injection 5-40 mL  5-40 mL IntraVENous PRN    0.9 % sodium chloride infusion   IntraVENous PRN    polyethylene glycol (GLYCOLAX) packet 17 g  17 g Oral Daily PRN    aluminum & magnesium hydroxide-simethicone (MAALOX) 200-200-20 MG/5ML suspension 30 mL  30 mL Oral Q6H PRN    acetaminophen (TYLENOL) tablet 650 mg  650 mg Oral Q6H PRN    Or    acetaminophen (TYLENOL) suppository 650 mg  650 mg Rectal Q6H PRN    furosemide (LASIX) injection 40 mg  40 mg IntraVENous Daily    lactulose (CHRONULAC) 10 GM/15ML solution 20 g  20 g Oral BID    cloNIDine (CATAPRES) tablet 0.1 mg  0.1 mg Oral Q4H PRN    melatonin tablet 6 mg  6 mg Oral Nightly PRN    hydrOXYzine HCl (ATARAX) tablet 25 mg  25 mg Oral Q6H PRN    diatrizoate meglumine-sodium (GASTROGRAFIN) 66-10 % solution 15 mL  15 mL Oral ONCE PRN    levETIRAcetam (KEPPRA) tablet 750 mg  750 mg Oral BID    heparin (porcine) injection 5,000 Units  5,000 Units SubCUTAneous 3 times per day    hydrALAZINE (APRESOLINE) injection 10 mg  10 mg IntraVENous Q6H PRN       Signed:  Jakub Mccoy    Part of this note may have been written by using a voice dictation software. The note has been proof read but may still contain some grammatical/other typographical errors.

## 2022-10-28 NOTE — PROGRESS NOTES
TRANSFER - IN REPORT:    Verbal report received from [facility] on Lynn Amezquita  being received from 5 for ordered procedure      Report consisted of patient's Situation, Background, Assessment and   Recommendations(SBAR). Information from the following report(s) Nurse Handoff Report was reviewed with the receiving nurse. Opportunity for questions and clarification was provided. Assessment completed upon patient's arrival to unit and care assumed.

## 2022-10-28 NOTE — PROGRESS NOTES
TRANSFER - OUT REPORT:    Verbal report given to Karen Weiss RN on Jayla Chow  being transferred to  for routine progression of patient care       Report consisted of patient's Situation, Background, Assessment and   Recommendations(SBAR). Information from the following report(s) Nurse Handoff Report and Procedure Summary was reviewed with the receiving nurse. Lines:   Peripheral IV 10/27/22 Left Antecubital (Active)   Site Assessment Clean, dry & intact 10/28/22 0400   Line Status Normal saline locked 10/28/22 0400   Phlebitis Assessment No symptoms 10/28/22 0400   Infiltration Assessment 0 10/28/22 0400   Dressing Status Clean, dry & intact 10/28/22 0400   Dressing Type Transparent 10/28/22 0400       Peripheral IV 10/27/22 Left Wrist (Active)   Site Assessment Clean, dry & intact 10/28/22 1253   Line Status Flushed 10/28/22 1253   Phlebitis Assessment No symptoms 10/28/22 1253   Infiltration Assessment 0 10/28/22 1253   Dressing Status Clean, dry & intact 10/28/22 1253   Dressing Type Transparent 10/28/22 1253        Opportunity for questions and clarification was provided.

## 2022-10-28 NOTE — ANESTHESIA PRE PROCEDURE
Department of Anesthesiology  Preprocedure Note       Name:  Gemma Jones   Age:  58 y.o.  :  1960                                          MRN:  [id number]         Date:  10/28/2022      Surgeon: Juan Alberto Maria):  Franko Rodriguez MD    Procedure: Procedure(s):  EGD ESOPHAGOGASTRODUODENOSCOPY Rm 720    Medications prior to admission:   Prior to Admission medications    Medication Sig Start Date End Date Taking? Authorizing Provider   spironolactone (ALDACTONE) 50 MG tablet Take 50 mg by mouth daily   Yes Historical Provider, MD   losartan (COZAAR) 50 MG tablet Take 50 mg by mouth daily   Yes Historical Provider, MD   cholestyramine light (PREVALITE) 4 GM/DOSE powder Take by mouth 2 times daily (with meals)    Ar Automatic Reconciliation   ergocalciferol (ERGOCALCIFEROL) 1.25 MG (14423 UT) capsule Take 50,000 Units by mouth    Ar Automatic Reconciliation   hydrOXYzine (ATARAX) 25 MG tablet Take 25 mg by mouth every 4 hours as needed 9/10/12   Ar Automatic Reconciliation   magnesium oxide (MAG-OX) 400 MG tablet Take 400 mg by mouth 2 times daily 12   Ar Automatic Reconciliation   potassium chloride (KLOR-CON M) 20 MEQ extended release tablet Take 20 mEq by mouth daily 9/10/12   Ar Automatic Reconciliation   traMADol (ULTRAM) 50 MG tablet Take 50 mg by mouth every 6 hours as needed.  3/16/15   Ar Automatic Reconciliation       Current medications:    Current Facility-Administered Medications   Medication Dose Route Frequency Provider Last Rate Last Admin    lidocaine 1 % injection 1 mL  1 mL IntraDERmal Once PRN Gato Lambert MD        lactated ringers infusion   IntraVENous Continuous Gato Lambert  mL/hr at 10/28/22 1155 New Bag at 10/28/22 1155    sodium chloride flush 0.9 % injection 5-40 mL  5-40 mL IntraVENous 2 times per day Gato Lambert MD        sodium chloride flush 0.9 % injection 5-40 mL  5-40 mL IntraVENous PRN Gato Lambert MD        0.9 % sodium chloride infusion   IntraVENous PRN Macrina Jara MD        spironolactone (ALDACTONE) tablet 50 mg  50 mg Oral Daily Ilsa Mcgregor MD   50 mg at 10/28/22 0802    losartan (COZAAR) tablet 50 mg  50 mg Oral Daily Ilsa Mcgregor MD   50 mg at 10/28/22 0803    sodium chloride flush 0.9 % injection 5-40 mL  5-40 mL IntraVENous 2 times per day Ilsa Mcgregor MD   10 mL at 10/28/22 0804    sodium chloride flush 0.9 % injection 5-40 mL  5-40 mL IntraVENous PRN Ilsa Mcgregor MD        0.9 % sodium chloride infusion   IntraVENous PRN Ilsa Mcgregor MD        polyethylene glycol [facility]) packet 17 g  17 g Oral Daily PRN Ilsa Mcgregor MD        aluminum & magnesium hydroxide-simethicone (MAALOX) 115-694-68 MG/5ML suspension 30 mL  30 mL Oral Q6H PRN Ilsa Mcgregor MD        acetaminophen (TYLENOL) tablet 650 mg  650 mg Oral Q6H PRN Ilsa Mcgregor MD        Or    acetaminophen (TYLENOL) suppository 650 mg  650 mg Rectal Q6H PRN Ilsa Mcgregor MD        furosemide (LASIX) injection 40 mg  40 mg IntraVENous Daily Ilsa Mcgregor MD   40 mg at 10/28/22 0803    lactulose (CHRONULAC) 10 GM/15ML solution 20 g  20 g Oral BID Ilsa Mcgregor MD   20 g at 10/27/22 0848    cloNIDine (CATAPRES) tablet 0.1 mg  0.1 mg Oral Q4H PRN Ilsa Mcgregor MD   0.1 mg at 10/27/22 1325    melatonin tablet 6 mg  6 mg Oral Nightly PRN Ilsa Mcgregor MD        hydrOXYzine HCl (ATARAX) tablet 25 mg  25 mg Oral Q6H PRN Lamar Vinson MD        diatrizoate meglumine-sodium (GASTROGRAFIN) 66-10 % solution 15 mL  15 mL Oral ONCE PRN Lamar Vinson MD   15 mL at 10/27/22 0849    levETIRAcetam (KEPPRA) tablet 750 mg  750 mg Oral BID Lamar Vinson MD   750 mg at 10/28/22 0802    heparin (porcine) injection 5,000 Units  5,000 Units SubCUTAneous 3 times per day Lamar Vinson MD        hydrALAZINE (APRESOLINE) injection 10 mg  10 mg IntraVENous Q6H PRN Lamar Vinson MD           Allergies:  No Known Allergies    Problem List:    Patient Active Problem List   Diagnosis Code    Thrombocytopenia, unspecified (HCC) D69.6    Hypomagnesemia E83.42    Hepatitis K75.9    Itching L29.9    Jaundice R17    HTN (hypertension) I10    Decompensated HCV cirrhosis (HCC) B19.20, K74.69    Hypertensive urgency I16.0    Demand ischemia (HCC) I24.8    Ascites R18.8    QT prolongation R94.31    Hypertensive emergency I16.1    Non-surgical epigastric abdominal pain R10.13       Past Medical History:        Diagnosis Date    Hypertension     Infectious disease     hep c       Past Surgical History:        Procedure Laterality Date    ORTHOPEDIC SURGERY      evie in right leg       Social History:    Social History     Tobacco Use    Smoking status: Every Day     Packs/day: 2.00     Types: Cigarettes    Smokeless tobacco: Current   Substance Use Topics    Alcohol use: Yes                                Ready to quit: Not Answered  Counseling given: Not Answered      Vital Signs (Current):   Vitals:    10/28/22 0335 10/28/22 0706 10/28/22 1118 10/28/22 1150   BP: (!) 158/97 (!) 165/94 (!) 185/105 (!) 193/100   Pulse: 76 74 73 64   Resp: 18 18 18 18   Temp: 98.6 °F (37 °C) 99 °F (37.2 °C) 98.2 °F (36.8 °C) 98.2 °F (36.8 °C)   TempSrc: Oral Oral Oral Oral   SpO2: 97% 99% 98% 97%   Weight:       Height:                                                  BP Readings from Last 3 Encounters:   10/28/22 (!) 193/100   09/24/22 (!) 186/92       NPO Status:                                                                                 BMI:   Wt Readings from Last 3 Encounters:   10/27/22 157 lb 8 oz (71.4 kg)   09/23/22 157 lb (71.2 kg)     Body mass index is 24.67 kg/m².     CBC:   Lab Results   Component Value Date/Time    WBC 4.0 10/28/2022 05:05 AM    RBC 2.75 10/28/2022 05:05 AM    HGB 8.5 10/28/2022 05:05 AM    HCT 26.1 10/28/2022 05:05 AM    MCV 94.9 10/28/2022 05:05 AM    RDW 13.4 10/28/2022 05:05 AM     10/28/2022 05:05 AM       CMP:   Lab Results   Component Value Date/Time     10/28/2022 05:05 AM    K 3.7 10/28/2022 05:05 AM     10/28/2022 05:05 AM    CO2 23 10/28/2022 05:05 AM    BUN 14 10/28/2022 05:05 AM    CREATININE 1.20 10/28/2022 05:05 AM    GFRAA 59 09/24/2022 01:45 AM    LABGLOM 51 10/28/2022 05:05 AM    GLUCOSE 85 10/28/2022 05:05 AM    PROT 6.7 10/28/2022 05:05 AM    CALCIUM 8.9 10/28/2022 05:05 AM    BILITOT 1.0 10/28/2022 05:05 AM    ALKPHOS 90 10/28/2022 05:05 AM    AST 29 10/28/2022 05:05 AM    ALT 13 10/28/2022 05:05 AM       POC Tests: No results for input(s): POCGLU, POCNA, POCK, POCCL, POCBUN, POCHEMO, POCHCT in the last 72 hours. Coags: No results found for: PROTIME, INR, APTT    HCG (If Applicable): No results found for: PREGTESTUR, PREGSERUM, HCG, HCGQUANT     ABGs: No results found for: PHART, PO2ART, QOK9ARD, PSM6VTV, BEART, T1RUQPKW     Type & Screen (If Applicable):  No results found for: LABABO, LABRH    Drug/Infectious Status (If Applicable):  Lab Results   Component Value Date/Time    HEPCAB REACTIVE 09/24/2022 03:45 AM       COVID-19 Screening (If Applicable):   Lab Results   Component Value Date/Time    COVID19 NOT DETECTED 10/27/2022 12:17 AM           Anesthesia Evaluation  Patient summary reviewed and Nursing notes reviewed no history of anesthetic complications:   Airway: Mallampati: II  TM distance: >3 FB   Neck ROM: full  Mouth opening: > = 3 FB   Dental: normal exam         Pulmonary:   (+) rales, bilateral current smoker (~2 ppd)                          ROS comment: Currently positive for Rhinovirus, productive cough   Cardiovascular:  Exercise tolerance: poor (<4 METS),   (+) hypertension:,       ECG reviewed      Echocardiogram reviewed    Cleared by cardiology           ROS comment: Tachycardia with QT prolongation    Admitted for HTN emergency. Found to have elevated trops, thought to be demand ischemia.  Now downtrending    TTE 10/27/2022   Left Ventricle: Normal left ventricular systolic function with a visually estimated EF of 50 - 55%. Left ventricle size is normal. Mildly increased wall thickness. Normal wall motion. Abnormal diastolic function.   Aortic Valve: Tricuspid valve. Mild regurgitation with a centrally directed jet.   Pericardium: Trivial pericardial effusion present. No indication of cardiac tamponade. Evidence includes normal LV size, no septal bounce, no IVC dilation and no chamber collapse. Neuro/Psych:   (+) seizures:,             GI/Hepatic/Renal:   (+) hepatitis: C, liver disease (EtOH cirrhosis):,          ROS comment: Abdominal pain, ascites. Endo/Other:    (+) blood dyscrasia: thrombocytopenia and anemia:., .                 Abdominal:   (+) obese,           Vascular: Other Findings:           Anesthesia Plan      TIVA     ASA 3       Induction: intravenous. Anesthetic plan and risks discussed with patient.                         Anaya Flood MD   10/28/2022

## 2022-10-29 PROBLEM — R77.8 ELEVATED TROPONIN: Status: ACTIVE | Noted: 2022-10-29

## 2022-10-29 PROBLEM — R79.89 ELEVATED TROPONIN: Status: ACTIVE | Noted: 2022-10-29

## 2022-10-29 LAB
ANION GAP SERPL CALC-SCNC: 8 MMOL/L (ref 2–11)
BUN SERPL-MCNC: 15 MG/DL (ref 8–23)
CALCIUM SERPL-MCNC: 8.8 MG/DL (ref 8.3–10.4)
CHLORIDE SERPL-SCNC: 105 MMOL/L (ref 101–110)
CO2 SERPL-SCNC: 23 MMOL/L (ref 21–32)
CREAT SERPL-MCNC: 1.5 MG/DL (ref 0.6–1)
ERYTHROCYTE [DISTWIDTH] IN BLOOD BY AUTOMATED COUNT: 13.3 % (ref 11.9–14.6)
GLUCOSE SERPL-MCNC: 173 MG/DL (ref 65–100)
HCT VFR BLD AUTO: 29.2 % (ref 35.8–46.3)
HGB BLD-MCNC: 9.7 G/DL (ref 11.7–15.4)
MAGNESIUM SERPL-MCNC: 1.3 MG/DL (ref 1.8–2.4)
MCH RBC QN AUTO: 31 PG (ref 26.1–32.9)
MCHC RBC AUTO-ENTMCNC: 33.2 G/DL (ref 31.4–35)
MCV RBC AUTO: 93.3 FL (ref 82–102)
NRBC # BLD: 0 K/UL (ref 0–0.2)
PLATELET # BLD AUTO: 148 K/UL (ref 150–450)
PMV BLD AUTO: 10.7 FL (ref 9.4–12.3)
POTASSIUM SERPL-SCNC: 3.3 MMOL/L (ref 3.5–5.1)
RBC # BLD AUTO: 3.13 M/UL (ref 4.05–5.2)
SODIUM SERPL-SCNC: 136 MMOL/L (ref 133–143)
WBC # BLD AUTO: 3.3 K/UL (ref 4.3–11.1)

## 2022-10-29 PROCEDURE — 80048 BASIC METABOLIC PNL TOTAL CA: CPT

## 2022-10-29 PROCEDURE — 6370000000 HC RX 637 (ALT 250 FOR IP): Performed by: FAMILY MEDICINE

## 2022-10-29 PROCEDURE — 94640 AIRWAY INHALATION TREATMENT: CPT

## 2022-10-29 PROCEDURE — 6370000000 HC RX 637 (ALT 250 FOR IP): Performed by: INTERNAL MEDICINE

## 2022-10-29 PROCEDURE — 6360000002 HC RX W HCPCS: Performed by: FAMILY MEDICINE

## 2022-10-29 PROCEDURE — 6360000002 HC RX W HCPCS: Performed by: PHYSICIAN ASSISTANT

## 2022-10-29 PROCEDURE — 6360000002 HC RX W HCPCS: Performed by: HOSPITALIST

## 2022-10-29 PROCEDURE — 2580000003 HC RX 258: Performed by: HOSPITALIST

## 2022-10-29 PROCEDURE — 99232 SBSQ HOSP IP/OBS MODERATE 35: CPT | Performed by: INTERNAL MEDICINE

## 2022-10-29 PROCEDURE — 1100000003 HC PRIVATE W/ TELEMETRY

## 2022-10-29 PROCEDURE — 94760 N-INVAS EAR/PLS OXIMETRY 1: CPT

## 2022-10-29 PROCEDURE — 6370000000 HC RX 637 (ALT 250 FOR IP): Performed by: PHYSICIAN ASSISTANT

## 2022-10-29 PROCEDURE — 6370000000 HC RX 637 (ALT 250 FOR IP): Performed by: HOSPITALIST

## 2022-10-29 PROCEDURE — 1100000000 HC RM PRIVATE

## 2022-10-29 PROCEDURE — 83735 ASSAY OF MAGNESIUM: CPT

## 2022-10-29 PROCEDURE — 85027 COMPLETE CBC AUTOMATED: CPT

## 2022-10-29 PROCEDURE — 36415 COLL VENOUS BLD VENIPUNCTURE: CPT

## 2022-10-29 RX ORDER — POTASSIUM CHLORIDE 7.45 MG/ML
10 INJECTION INTRAVENOUS ONCE
Status: COMPLETED | OUTPATIENT
Start: 2022-10-29 | End: 2022-10-29

## 2022-10-29 RX ORDER — AMLODIPINE BESYLATE 5 MG/1
5 TABLET ORAL DAILY
Status: DISCONTINUED | OUTPATIENT
Start: 2022-10-29 | End: 2022-10-30

## 2022-10-29 RX ORDER — POTASSIUM CHLORIDE 20 MEQ/1
40 TABLET, EXTENDED RELEASE ORAL ONCE
Status: COMPLETED | OUTPATIENT
Start: 2022-10-29 | End: 2022-10-29

## 2022-10-29 RX ADMIN — PANTOPRAZOLE SODIUM 40 MG: 40 TABLET, DELAYED RELEASE ORAL at 05:18

## 2022-10-29 RX ADMIN — ARFORMOTEROL TARTRATE 15 MCG: 15 SOLUTION RESPIRATORY (INHALATION) at 08:27

## 2022-10-29 RX ADMIN — HEPARIN SODIUM 5000 UNITS: 5000 INJECTION INTRAVENOUS; SUBCUTANEOUS at 14:51

## 2022-10-29 RX ADMIN — FUROSEMIDE 40 MG: 10 INJECTION, SOLUTION INTRAMUSCULAR; INTRAVENOUS at 08:39

## 2022-10-29 RX ADMIN — LACTULOSE 20 G: 20 SOLUTION ORAL at 21:42

## 2022-10-29 RX ADMIN — TIOTROPIUM BROMIDE INHALATION SPRAY 2 PUFF: 3.12 SPRAY, METERED RESPIRATORY (INHALATION) at 08:27

## 2022-10-29 RX ADMIN — BUDESONIDE 500 MCG: 0.5 INHALANT RESPIRATORY (INHALATION) at 20:36

## 2022-10-29 RX ADMIN — POTASSIUM CHLORIDE 40 MEQ: 1500 TABLET, EXTENDED RELEASE ORAL at 12:22

## 2022-10-29 RX ADMIN — AMLODIPINE BESYLATE 5 MG: 5 TABLET ORAL at 14:51

## 2022-10-29 RX ADMIN — LEVETIRACETAM 750 MG: 500 TABLET, FILM COATED ORAL at 21:42

## 2022-10-29 RX ADMIN — SODIUM CHLORIDE, PRESERVATIVE FREE 10 ML: 5 INJECTION INTRAVENOUS at 08:40

## 2022-10-29 RX ADMIN — POTASSIUM CHLORIDE 10 MEQ: 7.46 INJECTION, SOLUTION INTRAVENOUS at 12:22

## 2022-10-29 RX ADMIN — LACTULOSE 20 G: 20 SOLUTION ORAL at 08:39

## 2022-10-29 RX ADMIN — PANTOPRAZOLE SODIUM 40 MG: 40 TABLET, DELAYED RELEASE ORAL at 16:13

## 2022-10-29 RX ADMIN — LOSARTAN POTASSIUM 100 MG: 50 TABLET, FILM COATED ORAL at 08:39

## 2022-10-29 RX ADMIN — LEVETIRACETAM 750 MG: 500 TABLET, FILM COATED ORAL at 08:39

## 2022-10-29 RX ADMIN — SODIUM CHLORIDE, PRESERVATIVE FREE 10 ML: 5 INJECTION INTRAVENOUS at 21:45

## 2022-10-29 RX ADMIN — ARFORMOTEROL TARTRATE 15 MCG: 15 SOLUTION RESPIRATORY (INHALATION) at 20:36

## 2022-10-29 RX ADMIN — HEPARIN SODIUM 5000 UNITS: 5000 INJECTION INTRAVENOUS; SUBCUTANEOUS at 21:44

## 2022-10-29 RX ADMIN — CLONIDINE HYDROCHLORIDE 0.1 MG: 0.1 TABLET ORAL at 12:29

## 2022-10-29 RX ADMIN — HYDRALAZINE HYDROCHLORIDE 10 MG: 20 INJECTION INTRAMUSCULAR; INTRAVENOUS at 16:24

## 2022-10-29 RX ADMIN — BUDESONIDE 500 MCG: 0.5 INHALANT RESPIRATORY (INHALATION) at 08:27

## 2022-10-29 RX ADMIN — SPIRONOLACTONE 50 MG: 25 TABLET ORAL at 08:39

## 2022-10-29 RX ADMIN — HEPARIN SODIUM 5000 UNITS: 5000 INJECTION INTRAVENOUS; SUBCUTANEOUS at 05:18

## 2022-10-29 ASSESSMENT — PAIN SCALES - GENERAL
PAINLEVEL_OUTOF10: 0
PAINLEVEL_OUTOF10: 0

## 2022-10-29 NOTE — PLAN OF CARE
Problem: Respiratory - Adult  Goal: Achieves optimal ventilation and oxygenation  Outcome: Progressing  Flowsheets (Taken 10/29/2022 8257)  Achieves optimal ventilation and oxygenation:   Assess for changes in respiratory status   Respiratory therapy support as indicated   Assess for changes in mentation and behavior   Oxygen supplementation based on oxygen saturation or arterial blood gases   Encourage broncho-pulmonary hygiene including cough, deep breathe, incentive spirometry   Assess and instruct to report shortness of breath or any respiratory difficulty

## 2022-10-29 NOTE — PROGRESS NOTES
Hospitalist Progress Note   Admit Date:  10/26/2022 11:59 PM   Name:  Kem Persaud   Age:  58 y.o. Sex:  female  :  1960   MRN:  [id number]   Room:  [facility]/01    Presenting Complaint: Abdominal Pain (Epigastric and lower abdominal/)     Reason(s) for Admission: Demand ischemia [facility]) [I24.8]  Hypertensive urgency [I16.0]  Hypertensive emergency [I16.1]     Hospital Course:   Kem Persaud is a 58 y.o. female with medical history of hypertension, seizure disorder, noncompliance, who presented to triage in mild distress. Patient st she has been feeling bad for about a week. She has lethargy and weakness. Pt has abdominal pain, localized to epigastric region and lower abdomen as well as a cough. Pt expresses that she has had chills at home but no fever. Pt sts she has had nothing but ginger ale and water to drink. Pt cough is junky. Patient's primary complaint is epigastric abdominal pain. She states its been coming and going for better part of a week. She describes it as burning sensation in the epigastric region and nonradiating. No increasing or decreasing factors are noted. She states the only time it seems to go away as if she can get to sleep. She reports having been laying on the couch just all day today. She denies any fever or chills. She does report one episode of vomiting but denies any diarrhea or constipation. She rated the pain as 10/10 in intensity. Initial blood pressure in the emergency room was 208/133. She was placed on a Cardene drip and hospitalist asked to admit. Labs show a hemoglobin of 8.8. Ammonia level 46 and troponin is 464.7. Subjective & 24hr Events (10/29/22): \"I feel like I'm breathing better. \" Pleasant simple 62y. o. AA female sitting up in bed in NAD    Admits to improved congestion / wheezing; denies abd pain, N/V, HA, chest pain or dyspnea.       Assessment & Plan:     Principal Problem:    Hypertensive urgency / emergency  - off cardene gtt  - cont losartan / lasix / aldactone   - norvasc 5mg started per cards today, with thanks  - dc IVF  - prn hydralazine    Active Problems:    Demand ischemia / abnormal cardiac enzymes  - appreciate cardiology note, demand ischemia suspected  - no immediate need for ischemia w//u  - echo results with preserved EF, no significant findings  - troponin trending down      Abdominal pain  - s/p EGD 10/28/2022, acute gastric non-bleeding ulcer, path sent  - cont PPI bid  - denies abd pain today      Acute COPD exacerbation  - active smoker with 86pack year hx (2ppd x 43 years)  - better with [id number]  - cont nicotine patch  - NEEDS TO STOP SMOKING!!!      HCV / ascites / hyperammoniemia   - cont diuretics  - cont lactulose  - ongoing mgmt per GI      Seizure d/o  - cont keppra as dosed  - seizure precautions; prn ativan      Anticipated discharge needs:    - pending clinical course    Diet:  ADULT DIET; Regular  DVT PPx: hep sc  Code status: Full Code    Hospital Problems:  Principal Problem:    Hypertensive urgency  Active Problems:    Hypertensive emergency    Elevated troponin    Demand ischemia (HCC)    Ascites    QT prolongation    Non-surgical epigastric abdominal pain    Decompensated HCV cirrhosis ([facility] [address]  Resolved Problems:    * No resolved hospital problems.  *      Objective:   Patient Vitals for the past 24 hrs:   Temp Pulse Resp BP SpO2   10/29/22 1451 -- -- -- (!) 174/107 --   10/29/22 1226 97.3 °F (36.3 °C) 85 19 (!) 189/111 100 %   10/29/22 0830 -- -- -- (!) 187/119 --   10/29/22 0829 -- 76 16 -- 97 %   10/29/22 0821 97.5 °F (36.4 °C) 81 -- (!) 179/120 98 %   10/29/22 0333 97.7 °F (36.5 °C) 78 16 (!) 169/104 96 %   10/28/22 2347 97.9 °F (36.6 °C) 74 18 (!) 186/113 92 %   10/28/22 1935 -- 62 18 -- 97 %   10/28/22 1921 98.4 °F (36.9 °C) 66 18 (!) 164/100 97 %         Oxygen Therapy  SpO2: 100 %  Pulse via Oximetry: 64 beats per minute  Pulse Oximeter Device Mode: Intermittent  Pulse Oximeter Device Location: Right, Finger  O2 Device: None (Room air)  O2 Flow Rate (L/min): 0 L/min    Estimated body mass index is 24.67 kg/m² as calculated from the following:    Height as of this encounter: 5' 7\" (1.702 m). Weight as of this encounter: 157 lb 8 oz (71.4 kg). Intake/Output Summary (Last 24 hours) at 10/29/2022 1523  Last data filed at 10/29/2022 1347  Gross per 24 hour   Intake 260 ml   Output 750 ml   Net -490 ml           Physical Exam:     Blood pressure (!) 174/107, pulse 85, temperature 97.3 °F (36.3 °C), resp. rate 19, height 5' 7\" (1.702 m), weight 157 lb 8 oz (71.4 kg), SpO2 100 %. General:    Well nourished. Head:  Normocephalic, atraumatic  Eyes:  Sclerae appear normal.  Pupils equally round. ENT:  Nares appear normal, no drainage. Moist oral mucosa  Neck:  No restricted ROM. Trachea midline   CV:   RRR. No m/r/g. No jugular venous distension. Lungs:   Improved rhonchi, no wheezing b/l; no accessory muscles used  Abdomen: Bowel sounds present. Soft, nontender, nondistended. Extremities: + mvmt x 4  Skin:     No rashes and normal coloration. Warm and dry. Neuro:  CN II-XII grossly intact. Sensation intact. A&Ox3  Psych:  Normal mood and affect.       I have personally reviewed labs and tests showing:  Recent Labs:  Recent Results (from the past 48 hour(s))   CBC with Auto Differential    Collection Time: 10/28/22  5:05 AM   Result Value Ref Range    WBC 4.0 (L) 4.3 - 11.1 K/uL    RBC 2.75 (L) 4.05 - 5.2 M/uL    Hemoglobin 8.5 (L) 11.7 - 15.4 g/dL    Hematocrit 26.1 (L) 35.8 - 46.3 %    MCV 94.9 82 - 102 FL    MCH 30.9 26.1 - 32.9 PG    MCHC 32.6 31.4 - 35.0 g/dL    RDW 13.4 11.9 - 14.6 %    Platelets 974 (L) 551 - 450 K/uL    MPV 11.4 9.4 - 12.3 FL    nRBC 0.00 0.0 - 0.2 K/uL    Differential Type AUTOMATED      Seg Neutrophils 62 43 - 78 %    Lymphocytes 22 13 - 44 %    Monocytes 13 (H) 4.0 - 12.0 %    Eosinophils % 2 0.5 - 7.8 %    Basophils 1 0.0 - 2.0 %    Immature Granulocytes 0 0.0 - 5.0 %    Segs Absolute 2.5 1.7 - 8.2 K/UL    Absolute Lymph # 0.9 0.5 - 4.6 K/UL    Absolute Mono # 0.5 0.1 - 1.3 K/UL    Absolute Eos # 0.1 0.0 - 0.8 K/UL    Basophils Absolute 0.0 0.0 - 0.2 K/UL    Absolute Immature Granulocyte 0.0 0.0 - 0.5 K/UL   Comprehensive Metabolic Panel w/ Reflex to MG    Collection Time: 10/28/22  5:05 AM   Result Value Ref Range    Sodium 142 133 - 143 mmol/L    Potassium 3.7 3.5 - 5.1 mmol/L    Chloride 112 (H) 101 - 110 mmol/L    CO2 23 21 - 32 mmol/L    Anion Gap 7 2 - 11 mmol/L    Glucose 85 65 - 100 mg/dL    BUN 14 8 - 23 MG/DL    Creatinine 1.20 (H) 0.6 - 1.0 MG/DL    Est, Glom Filt Rate 51 (L) >60 ml/min/1.73m2    Calcium 8.9 8.3 - 10.4 MG/DL    Total Bilirubin 1.0 0.2 - 1.1 MG/DL    ALT 13 12 - 65 U/L    AST 29 15 - 37 U/L    Alk Phosphatase 90 50 - 136 U/L    Total Protein 6.7 6.3 - 8.2 g/dL    Albumin 2.3 (L) 3.2 - 4.6 g/dL    Globulin 4.4 2.8 - 4.5 g/dL    Albumin/Globulin Ratio 0.5 0.4 - 1.6     Ammonia    Collection Time: 10/28/22  5:05 AM   Result Value Ref Range    Ammonia 38 (H) 11 - 32 UMOL/L   CBC    Collection Time: 10/29/22  5:00 AM   Result Value Ref Range    WBC 3.3 (L) 4.3 - 11.1 K/uL    RBC 3.13 (L) 4.05 - 5.2 M/uL    Hemoglobin 9.7 (L) 11.7 - 15.4 g/dL    Hematocrit 29.2 (L) 35.8 - 46.3 %    MCV 93.3 82 - 102 FL    MCH 31.0 26.1 - 32.9 PG    MCHC 33.2 31.4 - 35.0 g/dL    RDW 13.3 11.9 - 14.6 %    Platelets 931 (L) 106 - 450 K/uL    MPV 10.7 9.4 - 12.3 FL    nRBC 0.00 0.0 - 0.2 K/uL   Basic Metabolic Panel    Collection Time: 10/29/22  5:00 AM   Result Value Ref Range    Sodium 136 133 - 143 mmol/L    Potassium 3.3 (L) 3.5 - 5.1 mmol/L    Chloride 105 101 - 110 mmol/L    CO2 23 21 - 32 mmol/L    Anion Gap 8 2 - 11 mmol/L    Glucose 173 (H) 65 - 100 mg/dL    BUN 15 8 - 23 MG/DL    Creatinine 1.50 (H) 0.6 - 1.0 MG/DL    Est, Glom Filt Rate 39 (L) >60 ml/min/1.73m2    Calcium 8.8 8.3 - 10.4 MG/DL   Magnesium    Collection Time: 10/29/22  5:00 AM   Result Value Ref Range    Magnesium 1.3 (L) 1.8 - 2.4 mg/dL       I have personally reviewed imaging studies showing: Other Studies:  CT ABDOMEN PELVIS WO CONTRAST Additional Contrast? Oral   Final Result   1. Cirrhosis. Based on ultrasound findings, consider liver protocol CT/MRI   with contrast to exclude a mass. 2.  Cholelithiasis. 3.  Mild ascites and small right pleural effusion. US ABDOMEN COMPLETE   Final Result   1. Cirrhotic appearance of the liver. Possible mass in the left lobe. Liver   protocol CT/MRI is recommended. 2.  Cholelithiasis. 3.  Mild ascites. XR CHEST (2 VW)   Final Result   Mild blunting of the right costophrenic angle may be due to atelectasis. Otherwise, no evidence of an acute intrathoracic process.              Current Meds:  Current Facility-Administered Medications   Medication Dose Route Frequency    amLODIPine (NORVASC) tablet 5 mg  5 mg Oral Daily    lactated ringers infusion   IntraVENous Continuous    Arformoterol Tartrate (BROVANA) nebulizer solution 15 mcg  15 mcg Nebulization BID    budesonide (PULMICORT) nebulizer suspension 500 mcg  0.5 mg Nebulization BID    tiotropium (SPIRIVA RESPIMAT) 2.5 MCG/ACT inhaler 2 puff  2 puff Inhalation Daily    losartan (COZAAR) tablet 100 mg  100 mg Oral Daily    pantoprazole (PROTONIX) tablet 40 mg  40 mg Oral BID AC    nicotine (NICODERM CQ) 21 MG/24HR 1 patch  1 patch TransDERmal Daily    LORazepam (ATIVAN) injection 2 mg  2 mg IntraVENous Q4H PRN    spironolactone (ALDACTONE) tablet 50 mg  50 mg Oral Daily    sodium chloride flush 0.9 % injection 5-40 mL  5-40 mL IntraVENous 2 times per day    sodium chloride flush 0.9 % injection 5-40 mL  5-40 mL IntraVENous PRN    0.9 % sodium chloride infusion   IntraVENous PRN    polyethylene glycol (GLYCOLAX) packet 17 g  17 g Oral Daily PRN    aluminum & magnesium hydroxide-simethicone (MAALOX) 200-200-20 MG/5ML suspension 30 mL  30 mL Oral Q6H PRN    acetaminophen (TYLENOL) tablet 650 mg  650 mg Oral Q6H PRN    Or    acetaminophen (TYLENOL) suppository 650 mg  650 mg Rectal Q6H PRN    furosemide (LASIX) injection 40 mg  40 mg IntraVENous Daily    lactulose (CHRONULAC) 10 GM/15ML solution 20 g  20 g Oral BID    cloNIDine (CATAPRES) tablet 0.1 mg  0.1 mg Oral Q4H PRN    melatonin tablet 6 mg  6 mg Oral Nightly PRN    hydrOXYzine HCl (ATARAX) tablet 25 mg  25 mg Oral Q6H PRN    diatrizoate meglumine-sodium (GASTROGRAFIN) 66-10 % solution 15 mL  15 mL Oral ONCE PRN    levETIRAcetam (KEPPRA) tablet 750 mg  750 mg Oral BID    heparin (porcine) injection 5,000 Units  5,000 Units SubCUTAneous 3 times per day    hydrALAZINE (APRESOLINE) injection 10 mg  10 mg IntraVENous Q6H PRN       Signed:  Jakub Mccoy    Part of this note may have been written by using a voice dictation software. The note has been proof read but may still contain some grammatical/other typographical errors.

## 2022-10-29 NOTE — PROGRESS NOTES
[facility] [place] CARDIOLOGY PROGRESS NOTE           10/29/2022 2:25 PM    Admit Date: 10/26/2022      Subjective:   Patient denies any active chest pain or dyspnea. Blood pressure remains poorly controlled. Echo yesterday showed normal heart function. ROS:  Cardiovascular:  As noted above    Objective:      Vitals:    10/29/22 0821 10/29/22 0829 10/29/22 0830 10/29/22 1226   BP: (!) 179/120  (!) 187/119 (!) 189/111   Pulse: 81 76  85   Resp:  16  19   Temp: 97.5 °F (36.4 °C)   97.3 °F (36.3 °C)   TempSrc: Oral      SpO2: 98% 97%  100%   Weight:       Height:           Physical Exam:  General-No Acute Distress  Neck- supple, no JVD  CV- regular rate and rhythm no MRG  Lung- clear bilaterally  Abd- soft, nontender, nondistended  Ext- no edema bilaterally. Skin- warm and dry      Data Review:   Recent Labs     10/29/22  0500 10/28/22  0505 10/26/22  2235   WBC 3.3* 4.0* 5.1   HGB 9.7* 8.5* 8.8*   HCT 29.2* 26.1* 26.5*   MCV 93.3 94.9 95.3   * 135* 161       Recent Labs     10/29/22  0500 10/28/22  0505    142   K 3.3* 3.7    112*   CO2 23 23   BUN 15 14   CREATININE 1.50* 1.20*   GLUCOSE 173* 85   CALCIUM 8.8 8.9   PROT  --  6.7   LABALBU  --  2.3*   BILITOT  --  1.0   ALKPHOS  --  90   AST  --  29   ALT  --  13   LABGLOM 39* 51*   GLOB  --  4.4       No results for input(s): CKTOTAL, CKMB, CKMBINDEX, DDIMER, TROPONINI in the last 720 hours. Echo (10/26/22): Left Ventricle: Normal left ventricular systolic function with a visually estimated EF of 50 - 55%. Left ventricle size is normal. Mildly increased wall thickness. Normal wall motion. Abnormal diastolic function. Aortic Valve: Tricuspid valve. Mild regurgitation with a centrally directed jet. Pericardium: Trivial pericardial effusion present. No indication of cardiac tamponade. Evidence includes normal LV size, no septal bounce, no IVC dilation and no chamber collapse.     Assessment/Plan:     C/Shay Quiroz 4627 Problems    Elevated troponin  No angina. Felt likely supply/demand imbalance. Normal ejection fraction without any segmental wall motion abnormalities. Continue titration of antihypertensive therapy. No plans for inpatient ischemia evaluation. Hypertensive emergency  Blood pressures remain severely elevated. Start amlodipine 5 mg a day and titrate      *Hypertensive urgency  See above. Demand ischemia ([facility] [address]  See above.       Non-surgical epigastric abdominal pain  Defer management to primary team.        Decompensated HCV cirrhosis ([facility] [address]  Defer management to primary team.                    Berkley Kebede MD

## 2022-10-30 LAB
ANION GAP SERPL CALC-SCNC: 7 MMOL/L (ref 2–11)
BUN SERPL-MCNC: 18 MG/DL (ref 8–23)
CALCIUM SERPL-MCNC: 9 MG/DL (ref 8.3–10.4)
CHLORIDE SERPL-SCNC: 107 MMOL/L (ref 101–110)
CO2 SERPL-SCNC: 23 MMOL/L (ref 21–32)
CREAT SERPL-MCNC: 1.6 MG/DL (ref 0.6–1)
ERYTHROCYTE [DISTWIDTH] IN BLOOD BY AUTOMATED COUNT: 13.4 % (ref 11.9–14.6)
GLUCOSE SERPL-MCNC: 92 MG/DL (ref 65–100)
HCT VFR BLD AUTO: 26.8 % (ref 35.8–46.3)
HGB BLD-MCNC: 8.8 G/DL (ref 11.7–15.4)
MAGNESIUM SERPL-MCNC: 1.4 MG/DL (ref 1.8–2.4)
MCH RBC QN AUTO: 30.6 PG (ref 26.1–32.9)
MCHC RBC AUTO-ENTMCNC: 32.8 G/DL (ref 31.4–35)
MCV RBC AUTO: 93.1 FL (ref 82–102)
NRBC # BLD: 0 K/UL (ref 0–0.2)
PLATELET # BLD AUTO: 140 K/UL (ref 150–450)
PMV BLD AUTO: 11.3 FL (ref 9.4–12.3)
POTASSIUM SERPL-SCNC: 2.9 MMOL/L (ref 3.5–5.1)
RBC # BLD AUTO: 2.88 M/UL (ref 4.05–5.2)
SODIUM SERPL-SCNC: 137 MMOL/L (ref 133–143)
WBC # BLD AUTO: 6.4 K/UL (ref 4.3–11.1)

## 2022-10-30 PROCEDURE — 99232 SBSQ HOSP IP/OBS MODERATE 35: CPT | Performed by: INTERNAL MEDICINE

## 2022-10-30 PROCEDURE — 36415 COLL VENOUS BLD VENIPUNCTURE: CPT

## 2022-10-30 PROCEDURE — 80048 BASIC METABOLIC PNL TOTAL CA: CPT

## 2022-10-30 PROCEDURE — 6370000000 HC RX 637 (ALT 250 FOR IP): Performed by: INTERNAL MEDICINE

## 2022-10-30 PROCEDURE — 1100000000 HC RM PRIVATE

## 2022-10-30 PROCEDURE — 6370000000 HC RX 637 (ALT 250 FOR IP): Performed by: FAMILY MEDICINE

## 2022-10-30 PROCEDURE — 6370000000 HC RX 637 (ALT 250 FOR IP): Performed by: PHYSICIAN ASSISTANT

## 2022-10-30 PROCEDURE — 94760 N-INVAS EAR/PLS OXIMETRY 1: CPT

## 2022-10-30 PROCEDURE — 85027 COMPLETE CBC AUTOMATED: CPT

## 2022-10-30 PROCEDURE — 6360000002 HC RX W HCPCS: Performed by: PHYSICIAN ASSISTANT

## 2022-10-30 PROCEDURE — 94640 AIRWAY INHALATION TREATMENT: CPT

## 2022-10-30 PROCEDURE — 2580000003 HC RX 258: Performed by: HOSPITALIST

## 2022-10-30 PROCEDURE — 83735 ASSAY OF MAGNESIUM: CPT

## 2022-10-30 PROCEDURE — 1100000003 HC PRIVATE W/ TELEMETRY

## 2022-10-30 PROCEDURE — 6360000002 HC RX W HCPCS: Performed by: FAMILY MEDICINE

## 2022-10-30 PROCEDURE — 6360000002 HC RX W HCPCS: Performed by: HOSPITALIST

## 2022-10-30 PROCEDURE — 6370000000 HC RX 637 (ALT 250 FOR IP): Performed by: HOSPITALIST

## 2022-10-30 RX ORDER — HYDRALAZINE HYDROCHLORIDE 25 MG/1
25 TABLET, FILM COATED ORAL EVERY 8 HOURS SCHEDULED
Status: DISCONTINUED | OUTPATIENT
Start: 2022-10-30 | End: 2022-10-31

## 2022-10-30 RX ORDER — LANOLIN ALCOHOL/MO/W.PET/CERES
400 CREAM (GRAM) TOPICAL 2 TIMES DAILY
Status: DISCONTINUED | OUTPATIENT
Start: 2022-10-30 | End: 2022-10-31 | Stop reason: HOSPADM

## 2022-10-30 RX ORDER — POTASSIUM CHLORIDE 7.45 MG/ML
10 INJECTION INTRAVENOUS ONCE
Status: COMPLETED | OUTPATIENT
Start: 2022-10-31 | End: 2022-10-31

## 2022-10-30 RX ORDER — MAGNESIUM SULFATE IN WATER 40 MG/ML
4000 INJECTION, SOLUTION INTRAVENOUS ONCE
Status: COMPLETED | OUTPATIENT
Start: 2022-10-30 | End: 2022-10-30

## 2022-10-30 RX ORDER — AMLODIPINE BESYLATE 10 MG/1
10 TABLET ORAL DAILY
Status: DISCONTINUED | OUTPATIENT
Start: 2022-10-31 | End: 2022-10-31 | Stop reason: HOSPADM

## 2022-10-30 RX ORDER — POTASSIUM CHLORIDE 20 MEQ/1
40 TABLET, EXTENDED RELEASE ORAL ONCE
Status: COMPLETED | OUTPATIENT
Start: 2022-10-30 | End: 2022-10-30

## 2022-10-30 RX ORDER — POTASSIUM CHLORIDE 7.45 MG/ML
10 INJECTION INTRAVENOUS
Status: DISPENSED | OUTPATIENT
Start: 2022-10-30 | End: 2022-10-30

## 2022-10-30 RX ADMIN — SODIUM CHLORIDE, PRESERVATIVE FREE 10 ML: 5 INJECTION INTRAVENOUS at 09:57

## 2022-10-30 RX ADMIN — HEPARIN SODIUM 5000 UNITS: 5000 INJECTION INTRAVENOUS; SUBCUTANEOUS at 06:03

## 2022-10-30 RX ADMIN — TIOTROPIUM BROMIDE INHALATION SPRAY 2 PUFF: 3.12 SPRAY, METERED RESPIRATORY (INHALATION) at 08:58

## 2022-10-30 RX ADMIN — PANTOPRAZOLE SODIUM 40 MG: 40 TABLET, DELAYED RELEASE ORAL at 16:02

## 2022-10-30 RX ADMIN — MOMETASONE FUROATE AND FORMOTEROL FUMARATE DIHYDRATE 2 PUFF: 200; 5 AEROSOL RESPIRATORY (INHALATION) at 19:45

## 2022-10-30 RX ADMIN — BUDESONIDE 500 MCG: 0.5 INHALANT RESPIRATORY (INHALATION) at 08:58

## 2022-10-30 RX ADMIN — MAGNESIUM SULFATE HEPTAHYDRATE 4000 MG: 40 INJECTION, SOLUTION INTRAVENOUS at 16:02

## 2022-10-30 RX ADMIN — LEVETIRACETAM 750 MG: 500 TABLET, FILM COATED ORAL at 09:55

## 2022-10-30 RX ADMIN — LEVETIRACETAM 750 MG: 500 TABLET, FILM COATED ORAL at 20:48

## 2022-10-30 RX ADMIN — AMLODIPINE BESYLATE 5 MG: 5 TABLET ORAL at 09:55

## 2022-10-30 RX ADMIN — FUROSEMIDE 40 MG: 10 INJECTION, SOLUTION INTRAMUSCULAR; INTRAVENOUS at 09:55

## 2022-10-30 RX ADMIN — HEPARIN SODIUM 5000 UNITS: 5000 INJECTION INTRAVENOUS; SUBCUTANEOUS at 20:48

## 2022-10-30 RX ADMIN — HYDRALAZINE HYDROCHLORIDE 25 MG: 25 TABLET, FILM COATED ORAL at 20:48

## 2022-10-30 RX ADMIN — SODIUM CHLORIDE, PRESERVATIVE FREE 10 ML: 5 INJECTION INTRAVENOUS at 20:56

## 2022-10-30 RX ADMIN — POTASSIUM CHLORIDE 40 MEQ: 1500 TABLET, EXTENDED RELEASE ORAL at 16:02

## 2022-10-30 RX ADMIN — POTASSIUM CHLORIDE 10 MEQ: 7.46 INJECTION, SOLUTION INTRAVENOUS at 16:07

## 2022-10-30 RX ADMIN — LACTULOSE 20 G: 20 SOLUTION ORAL at 20:49

## 2022-10-30 RX ADMIN — HEPARIN SODIUM 5000 UNITS: 5000 INJECTION INTRAVENOUS; SUBCUTANEOUS at 14:31

## 2022-10-30 RX ADMIN — Medication 400 MG: at 20:48

## 2022-10-30 RX ADMIN — LACTULOSE 20 G: 20 SOLUTION ORAL at 09:55

## 2022-10-30 RX ADMIN — LOSARTAN POTASSIUM 100 MG: 50 TABLET, FILM COATED ORAL at 09:55

## 2022-10-30 RX ADMIN — PANTOPRAZOLE SODIUM 40 MG: 40 TABLET, DELAYED RELEASE ORAL at 06:03

## 2022-10-30 RX ADMIN — ARFORMOTEROL TARTRATE 15 MCG: 15 SOLUTION RESPIRATORY (INHALATION) at 08:58

## 2022-10-30 RX ADMIN — SPIRONOLACTONE 50 MG: 25 TABLET ORAL at 09:55

## 2022-10-30 RX ADMIN — HYDRALAZINE HYDROCHLORIDE 25 MG: 25 TABLET, FILM COATED ORAL at 14:31

## 2022-10-30 ASSESSMENT — PAIN SCALES - GENERAL: PAINLEVEL_OUTOF10: 0

## 2022-10-30 NOTE — PROGRESS NOTES
[facility] [[email or web address]] CARDIOLOGY PROGRESS NOTE           10/30/2022 10:26 AM    Admit Date: 10/26/2022      Subjective:   Patient denies any active chest pain or dyspnea. Blood pressure remains poorly controlled. Echo yesterday showed normal heart function. ROS:  Cardiovascular:  As noted above    Objective:      Vitals:    10/30/22 0306 10/30/22 0400 10/30/22 0713 10/30/22 0900   BP: (!) 160/97 (!) 157/97 (!) 174/97    Pulse: 79 79 76 80   Resp: 16  18 16   Temp: 97.8 °F (36.6 °C)  98.2 °F (36.8 °C)    TempSrc: Oral  Oral    SpO2: 96%  97% 98%   Weight:       Height:           Physical Exam:  General-No Acute Distress  Neck- supple, no JVD  CV- regular rate and rhythm no MRG  Lung- clear bilaterally  Abd- soft, nontender, nondistended  Ext- no edema bilaterally. Skin- warm and dry      Data Review:   Recent Labs     10/30/22  0527 10/29/22  0500 10/28/22  0505   WBC 6.4 3.3* 4.0*   HGB 8.8* 9.7* 8.5*   HCT 26.8* 29.2* 26.1*   MCV 93.1 93.3 94.9   * 148* 135*         Recent Labs     10/30/22  0527 10/29/22  0500 10/28/22  0505      < > 142   K 2.9*   < > 3.7      < > 112*   CO2 23   < > 23   BUN 18   < > 14   CREATININE 1.60*   < > 1.20*   GLUCOSE 92   < > 85   CALCIUM 9.0   < > 8.9   PROT  --   --  6.7   LABALBU  --   --  2.3*   BILITOT  --   --  1.0   ALKPHOS  --   --  90   AST  --   --  29   ALT  --   --  13   LABGLOM 36*   < > 51*   GLOB  --   --  4.4    < > = values in this interval not displayed. No results for input(s): CKTOTAL, CKMB, CKMBINDEX, DDIMER, TROPONINI in the last 720 hours. Echo (10/26/22): Left Ventricle: Normal left ventricular systolic function with a visually estimated EF of 50 - 55%. Left ventricle size is normal. Mildly increased wall thickness. Normal wall motion. Abnormal diastolic function. Aortic Valve: Tricuspid valve. Mild regurgitation with a centrally directed jet. Pericardium: Trivial pericardial effusion present.  No indication of cardiac tamponade. Evidence includes normal LV size, no septal bounce, no IVC dilation and no chamber collapse. Assessment/Plan:     Active Hospital Problems    Elevated troponin  No angina. Felt likely supply/demand imbalance. Normal ejection fraction without any segmental wall motion abnormalities. Continue titration of antihypertensive therapy. No plans for inpatient ischemia evaluation. Hypertensive emergency  Blood pressures poorly controlled. Increase Norvasc to 10 mg a day. *Hypertensive urgency  See above. Demand ischemia ([facility] [address]  See above.       Non-surgical epigastric abdominal pain  Defer management to primary team.        Decompensated HCV cirrhosis ([facility] [address]  Defer management to primary team.                    Juwan Menchaca MD

## 2022-10-30 NOTE — PROGRESS NOTES
Hospitalist Progress Note   Admit Date:  10/26/2022 11:59 PM   Name:  Sabina Tellez   Age:  58 y.o. Sex:  female  :  1960   MRN:  [id number]   Room:  720/01    Presenting Complaint: Abdominal Pain (Epigastric and lower abdominal/)     Reason(s) for Admission: Demand ischemia [facility]) [I24.8]  Hypertensive urgency [I16.0]  Hypertensive emergency [I16.1]     Hospital Course:   Sabina Tellez is a 58 y.o. female with medical history of hypertension, seizure disorder, noncompliance, who presented to triage in mild distress. Patient st she has been feeling bad for about a week. She has lethargy and weakness. Pt has abdominal pain, localized to epigastric region and lower abdomen as well as a cough. Pt expresses that she has had chills at home but no fever. Pt sts she has had nothing but ginger ale and water to drink. Pt cough is junky. Patient's primary complaint is epigastric abdominal pain. She states its been coming and going for better part of a week. She describes it as burning sensation in the epigastric region and nonradiating. No increasing or decreasing factors are noted. She states the only time it seems to go away as if she can get to sleep. She reports having been laying on the couch just all day today. She denies any fever or chills. She does report one episode of vomiting but denies any diarrhea or constipation. She rated the pain as 10/10 in intensity. Initial blood pressure in the emergency room was 208/133. She was placed on a Cardene drip and hospitalist asked to admit. Labs show a hemoglobin of 8.8. Ammonia level 46 and troponin is 464.7. Subjective & 24hr Events (10/30/22):   \"I'm doing ok; why is my blood pressure so high. \"  Pleasant simple 62y. o. AA female sitting up in bed in NAD    Denies dyspnea, cough, congestion or wheezing; further denies abd pain, N/V, HA.       Assessment & Plan:     Principal Problem:    Hypertensive urgency / emergency  - off cardene gtt, appreciate cardiology assistance, norvasc titrated up to 10mg daily  - cont losartan / aldactone, add hydralazine 25mg tid  - prn clonidine    Active Problems:    Demand ischemia / abnormal cardiac enzymes  - no immediate need for ischemia w/u; pt remains asymptomatic  - echo results with preserved EF, no significant findings  - troponin trended down 464.7 ~> 335.2      Abdominal pain  - s/p EGD 10/28/2022, acute gastric non-bleeding ulcer, path sent  - cont PPI bid  - continues to denies abd pains      Acute COPD exacerbation  - active smoker with 86pack year hx (2ppd x 43 years)  - resolved exacerbation  - transition to dulera, cont spiriva, cont nicotine patch  - NEEDS TO STOP SMOKING!!! Hypokalemia / hypomagnesemia  - attributed to GI loss and diuretics  - KCL supplementation continued (noted on aldactone)  - mag replaced; start bid mag oxide  - f/u AM labs      HCV / ascites / hyperammoniemia   - cont diuretics / lactulose  - outpatient mgmt per GI      Seizure d/o  - cont keppra as dosed  - seizure precautions; prn ativan      Anticipated discharge needs:    - pending clinical course    Diet:  ADULT DIET; Regular  DVT PPx: hep sc  Code status: Full Code    Hospital Problems:  Principal Problem:    Hypertensive urgency  Active Problems:    Hypertensive emergency    Elevated troponin    Demand ischemia (HCC)    Ascites    QT prolongation    Non-surgical epigastric abdominal pain    Decompensated HCV cirrhosis ([facility] [address]  Resolved Problems:    * No resolved hospital problems.  *      Objective:   Patient Vitals for the past 24 hrs:   Temp Pulse Resp BP SpO2   10/30/22 1430 -- -- -- (!) 169/107 --   10/30/22 1124 97.3 °F (36.3 °C) 86 19 (!) 169/108 98 %   10/30/22 0900 -- 80 16 -- 98 %   10/30/22 0713 98.2 °F (36.8 °C) 76 18 (!) 174/97 97 %   10/30/22 0400 -- 79 -- (!) 157/97 --   10/30/22 0306 97.8 °F (36.6 °C) 79 16 (!) 160/97 96 %   10/29/22 2241 98.6 °F (37 °C) 89 16 139/82 91 %   10/29/22 2036 -- 94 18 -- 98 % 10/29/22 1921 97.9 °F (36.6 °C) 93 17 (!) 153/96 98 %   10/29/22 1616 -- -- -- (!) 176/104 --   10/29/22 1529 98.6 °F (37 °C) 83 18 (!) 174/106 97 %   10/29/22 1451 -- -- -- (!) 174/107 --         Oxygen Therapy  SpO2: 98 %  Pulse via Oximetry: 64 beats per minute  Pulse Oximeter Device Mode: Intermittent  Pulse Oximeter Device Location: Right, Finger  O2 Device: None (Room air)  O2 Flow Rate (L/min): 0 L/min    Estimated body mass index is 24.67 kg/m² as calculated from the following:    Height as of this encounter: 5' 7\" (1.702 m). Weight as of this encounter: 157 lb 8 oz (71.4 kg). No intake or output data in the 24 hours ending 10/30/22 1448        Physical Exam:     Blood pressure (!) 169/107, pulse 86, temperature 97.3 °F (36.3 °C), temperature source Axillary, resp. rate 19, height 5' 7\" (1.702 m), weight 157 lb 8 oz (71.4 kg), SpO2 98 %. General:    Well developed in NAD. Head:  Normocephalic, atraumatic  Eyes:  Sclerae appear normal.  Pupils equally round. ENT:  Nares appear normal, no drainage. Moist oral mucosa  Neck:  No restricted ROM. Trachea midline   CV:   RRR. No m/r/g. No jugular venous distension. Lungs:   CTA b/l; no wheezing / rhonchi, ; no accessory muscles used  Abdomen: Bowel sounds present. Soft, nontender, nondistended. Extremities: + mvmt x 4  Skin:     No rashes and normal coloration. Warm and dry. Neuro:  CN II-XII grossly intact. Sensation intact. A&Ox3  Psych:  Normal mood and affect.       I have personally reviewed labs and tests showing:  Recent Labs:  Recent Results (from the past 48 hour(s))   CBC    Collection Time: 10/29/22  5:00 AM   Result Value Ref Range    WBC 3.3 (L) 4.3 - 11.1 K/uL    RBC 3.13 (L) 4.05 - 5.2 M/uL    Hemoglobin 9.7 (L) 11.7 - 15.4 g/dL    Hematocrit 29.2 (L) 35.8 - 46.3 %    MCV 93.3 82 - 102 FL    MCH 31.0 26.1 - 32.9 PG    MCHC 33.2 31.4 - 35.0 g/dL    RDW 13.3 11.9 - 14.6 %    Platelets 555 (L) 870 - 450 K/uL    MPV 10.7 9.4 - 12.3 FL    nRBC 0.00 0.0 - 0.2 K/uL   Basic Metabolic Panel    Collection Time: 10/29/22  5:00 AM   Result Value Ref Range    Sodium 136 133 - 143 mmol/L    Potassium 3.3 (L) 3.5 - 5.1 mmol/L    Chloride 105 101 - 110 mmol/L    CO2 23 21 - 32 mmol/L    Anion Gap 8 2 - 11 mmol/L    Glucose 173 (H) 65 - 100 mg/dL    BUN 15 8 - 23 MG/DL    Creatinine 1.50 (H) 0.6 - 1.0 MG/DL    Est, Glom Filt Rate 39 (L) >60 ml/min/1.73m2    Calcium 8.8 8.3 - 10.4 MG/DL   Magnesium    Collection Time: 10/29/22  5:00 AM   Result Value Ref Range    Magnesium 1.3 (L) 1.8 - 2.4 mg/dL   CBC    Collection Time: 10/30/22  5:27 AM   Result Value Ref Range    WBC 6.4 4.3 - 11.1 K/uL    RBC 2.88 (L) 4.05 - 5.2 M/uL    Hemoglobin 8.8 (L) 11.7 - 15.4 g/dL    Hematocrit 26.8 (L) 35.8 - 46.3 %    MCV 93.1 82 - 102 FL    MCH 30.6 26.1 - 32.9 PG    MCHC 32.8 31.4 - 35.0 g/dL    RDW 13.4 11.9 - 14.6 %    Platelets 490 (L) 815 - 450 K/uL    MPV 11.3 9.4 - 12.3 FL    nRBC 0.00 0.0 - 0.2 K/uL   Basic Metabolic Panel    Collection Time: 10/30/22  5:27 AM   Result Value Ref Range    Sodium 137 133 - 143 mmol/L    Potassium 2.9 (L) 3.5 - 5.1 mmol/L    Chloride 107 101 - 110 mmol/L    CO2 23 21 - 32 mmol/L    Anion Gap 7 2 - 11 mmol/L    Glucose 92 65 - 100 mg/dL    BUN 18 8 - 23 MG/DL    Creatinine 1.60 (H) 0.6 - 1.0 MG/DL    Est, Glom Filt Rate 36 (L) >60 ml/min/1.73m2    Calcium 9.0 8.3 - 10.4 MG/DL   Magnesium    Collection Time: 10/30/22  5:27 AM   Result Value Ref Range    Magnesium 1.4 (L) 1.8 - 2.4 mg/dL       I have personally reviewed imaging studies showing: Other Studies:  CT ABDOMEN PELVIS WO CONTRAST Additional Contrast? Oral   Final Result   1. Cirrhosis. Based on ultrasound findings, consider liver protocol CT/MRI   with contrast to exclude a mass. 2.  Cholelithiasis. 3.  Mild ascites and small right pleural effusion. US ABDOMEN COMPLETE   Final Result   1. Cirrhotic appearance of the liver. Possible mass in the left lobe. Liver   protocol CT/MRI is recommended. 2.  Cholelithiasis. 3.  Mild ascites. XR CHEST (2 VW)   Final Result   Mild blunting of the right costophrenic angle may be due to atelectasis. Otherwise, no evidence of an acute intrathoracic process.              Current Meds:  Current Facility-Administered Medications   Medication Dose Route Frequency    [START ON 10/31/2022] amLODIPine (NORVASC) tablet 10 mg  10 mg Oral Daily    hydrALAZINE (APRESOLINE) tablet 25 mg  25 mg Oral 3 times per day    mometasone-formoterol (DULERA) 200-5 MCG/ACT inhaler 2 puff  2 puff Inhalation BID    tiotropium (SPIRIVA RESPIMAT) 2.5 MCG/ACT inhaler 2 puff  2 puff Inhalation Daily    losartan (COZAAR) tablet 100 mg  100 mg Oral Daily    pantoprazole (PROTONIX) tablet 40 mg  40 mg Oral BID AC    nicotine (NICODERM CQ) 21 MG/24HR 1 patch  1 patch TransDERmal Daily    LORazepam (ATIVAN) injection 2 mg  2 mg IntraVENous Q4H PRN    spironolactone (ALDACTONE) tablet 50 mg  50 mg Oral Daily    sodium chloride flush 0.9 % injection 5-40 mL  5-40 mL IntraVENous 2 times per day    sodium chloride flush 0.9 % injection 5-40 mL  5-40 mL IntraVENous PRN    0.9 % sodium chloride infusion   IntraVENous PRN    polyethylene glycol (GLYCOLAX) packet 17 g  17 g Oral Daily PRN    aluminum & magnesium hydroxide-simethicone (MAALOX) 200-200-20 MG/5ML suspension 30 mL  30 mL Oral Q6H PRN    acetaminophen (TYLENOL) tablet 650 mg  650 mg Oral Q6H PRN    Or    acetaminophen (TYLENOL) suppository 650 mg  650 mg Rectal Q6H PRN    lactulose (CHRONULAC) 10 GM/15ML solution 20 g  20 g Oral BID    cloNIDine (CATAPRES) tablet 0.1 mg  0.1 mg Oral Q4H PRN    melatonin tablet 6 mg  6 mg Oral Nightly PRN    hydrOXYzine HCl (ATARAX) tablet 25 mg  25 mg Oral Q6H PRN    diatrizoate meglumine-sodium (GASTROGRAFIN) 66-10 % solution 15 mL  15 mL Oral ONCE PRN    levETIRAcetam (KEPPRA) tablet 750 mg  750 mg Oral BID    heparin (porcine) injection 5,000 Units  5,000 Units SubCUTAneous 3 times per day    hydrALAZINE (APRESOLINE) injection 10 mg  10 mg IntraVENous Q6H PRN       Signed:  SAI Mccoy    Part of this note may have been written by using a voice dictation software. The note has been proof read but may still contain some grammatical/other typographical errors.

## 2022-10-30 NOTE — PLAN OF CARE
Problem: Respiratory - Adult  Goal: Achieves optimal ventilation and oxygenation  10/30/2022 0902 by Antonio Mcarthur RCP  Outcome: Progressing  Flowsheets (Taken 10/30/2022 0902)  Achieves optimal ventilation and oxygenation:   Assess for changes in respiratory status   Respiratory therapy support as indicated   Assess for changes in mentation and behavior   Oxygen supplementation based on oxygen saturation or arterial blood gases   Encourage broncho-pulmonary hygiene including cough, deep breathe, incentive spirometry   Assess and instruct to report shortness of breath or any respiratory difficulty

## 2022-10-31 ENCOUNTER — HOME HEALTH ADMISSION (OUTPATIENT)
Dept: HOME HEALTH SERVICES | Facility: HOME HEALTH | Age: 62
End: 2022-10-31
Payer: MEDICAID

## 2022-10-31 VITALS
HEART RATE: 79 BPM | HEIGHT: 67 IN | SYSTOLIC BLOOD PRESSURE: 168 MMHG | RESPIRATION RATE: 14 BRPM | BODY MASS INDEX: 24.72 KG/M2 | DIASTOLIC BLOOD PRESSURE: 97 MMHG | OXYGEN SATURATION: 98 % | WEIGHT: 157.5 LBS | TEMPERATURE: 97.5 F

## 2022-10-31 LAB
ANION GAP SERPL CALC-SCNC: 4 MMOL/L (ref 2–11)
BUN SERPL-MCNC: 18 MG/DL (ref 8–23)
CALCIUM SERPL-MCNC: 8.9 MG/DL (ref 8.3–10.4)
CHLORIDE SERPL-SCNC: 111 MMOL/L (ref 101–110)
CO2 SERPL-SCNC: 22 MMOL/L (ref 21–32)
CREAT SERPL-MCNC: 1.8 MG/DL (ref 0.6–1)
ERYTHROCYTE [DISTWIDTH] IN BLOOD BY AUTOMATED COUNT: 13.8 % (ref 11.9–14.6)
GLUCOSE SERPL-MCNC: 93 MG/DL (ref 65–100)
HCT VFR BLD AUTO: 25.6 % (ref 35.8–46.3)
HGB BLD-MCNC: 8.3 G/DL (ref 11.7–15.4)
MAGNESIUM SERPL-MCNC: 2.3 MG/DL (ref 1.8–2.4)
MCH RBC QN AUTO: 30.6 PG (ref 26.1–32.9)
MCHC RBC AUTO-ENTMCNC: 32.4 G/DL (ref 31.4–35)
MCV RBC AUTO: 94.5 FL (ref 82–102)
NRBC # BLD: 0 K/UL (ref 0–0.2)
PLATELET # BLD AUTO: 137 K/UL (ref 150–450)
PMV BLD AUTO: 10.9 FL (ref 9.4–12.3)
POTASSIUM SERPL-SCNC: 3.9 MMOL/L (ref 3.5–5.1)
RBC # BLD AUTO: 2.71 M/UL (ref 4.05–5.2)
SODIUM SERPL-SCNC: 137 MMOL/L (ref 133–143)
WBC # BLD AUTO: 5.8 K/UL (ref 4.3–11.1)

## 2022-10-31 PROCEDURE — 83735 ASSAY OF MAGNESIUM: CPT

## 2022-10-31 PROCEDURE — 6360000002 HC RX W HCPCS: Performed by: FAMILY MEDICINE

## 2022-10-31 PROCEDURE — 6360000002 HC RX W HCPCS: Performed by: HOSPITALIST

## 2022-10-31 PROCEDURE — 6370000000 HC RX 637 (ALT 250 FOR IP): Performed by: FAMILY MEDICINE

## 2022-10-31 PROCEDURE — 99231 SBSQ HOSP IP/OBS SF/LOW 25: CPT | Performed by: INTERNAL MEDICINE

## 2022-10-31 PROCEDURE — 6370000000 HC RX 637 (ALT 250 FOR IP): Performed by: PHYSICIAN ASSISTANT

## 2022-10-31 PROCEDURE — 36415 COLL VENOUS BLD VENIPUNCTURE: CPT

## 2022-10-31 PROCEDURE — 94760 N-INVAS EAR/PLS OXIMETRY 1: CPT

## 2022-10-31 PROCEDURE — 6370000000 HC RX 637 (ALT 250 FOR IP): Performed by: INTERNAL MEDICINE

## 2022-10-31 PROCEDURE — 85027 COMPLETE CBC AUTOMATED: CPT

## 2022-10-31 PROCEDURE — 6370000000 HC RX 637 (ALT 250 FOR IP): Performed by: HOSPITALIST

## 2022-10-31 PROCEDURE — 80048 BASIC METABOLIC PNL TOTAL CA: CPT

## 2022-10-31 PROCEDURE — 94640 AIRWAY INHALATION TREATMENT: CPT

## 2022-10-31 RX ORDER — LOSARTAN POTASSIUM 100 MG/1
100 TABLET ORAL DAILY
Qty: 30 TABLET | Refills: 0 | Status: SHIPPED | OUTPATIENT
Start: 2022-11-01 | End: 2022-12-01

## 2022-10-31 RX ORDER — AMLODIPINE BESYLATE 10 MG/1
10 TABLET ORAL DAILY
Qty: 30 TABLET | Refills: 0 | Status: SHIPPED | OUTPATIENT
Start: 2022-11-01 | End: 2022-12-01

## 2022-10-31 RX ORDER — LEVETIRACETAM 750 MG/1
750 TABLET ORAL 2 TIMES DAILY
Qty: 60 TABLET | Refills: 0 | Status: SHIPPED | OUTPATIENT
Start: 2022-10-31 | End: 2022-11-30

## 2022-10-31 RX ORDER — PANTOPRAZOLE SODIUM 40 MG/1
40 TABLET, DELAYED RELEASE ORAL
Qty: 60 TABLET | Refills: 0 | Status: SHIPPED | OUTPATIENT
Start: 2022-10-31 | End: 2022-11-30

## 2022-10-31 RX ORDER — HYDRALAZINE HYDROCHLORIDE 50 MG/1
50 TABLET, FILM COATED ORAL EVERY 8 HOURS SCHEDULED
Qty: 90 TABLET | Refills: 0 | Status: SHIPPED | OUTPATIENT
Start: 2022-10-31 | End: 2022-11-30

## 2022-10-31 RX ORDER — HYDRALAZINE HYDROCHLORIDE 50 MG/1
50 TABLET, FILM COATED ORAL EVERY 8 HOURS SCHEDULED
Status: DISCONTINUED | OUTPATIENT
Start: 2022-10-31 | End: 2022-10-31 | Stop reason: HOSPADM

## 2022-10-31 RX ADMIN — HYDRALAZINE HYDROCHLORIDE 50 MG: 50 TABLET, FILM COATED ORAL at 14:23

## 2022-10-31 RX ADMIN — LACTULOSE 20 G: 20 SOLUTION ORAL at 09:15

## 2022-10-31 RX ADMIN — HYDRALAZINE HYDROCHLORIDE 25 MG: 25 TABLET, FILM COATED ORAL at 05:09

## 2022-10-31 RX ADMIN — PANTOPRAZOLE SODIUM 40 MG: 40 TABLET, DELAYED RELEASE ORAL at 16:07

## 2022-10-31 RX ADMIN — AMLODIPINE BESYLATE 10 MG: 10 TABLET ORAL at 09:13

## 2022-10-31 RX ADMIN — HEPARIN SODIUM 5000 UNITS: 5000 INJECTION INTRAVENOUS; SUBCUTANEOUS at 05:08

## 2022-10-31 RX ADMIN — LEVETIRACETAM 750 MG: 500 TABLET, FILM COATED ORAL at 09:13

## 2022-10-31 RX ADMIN — TIOTROPIUM BROMIDE INHALATION SPRAY 2 PUFF: 3.12 SPRAY, METERED RESPIRATORY (INHALATION) at 08:44

## 2022-10-31 RX ADMIN — Medication 400 MG: at 09:13

## 2022-10-31 RX ADMIN — SPIRONOLACTONE 50 MG: 25 TABLET ORAL at 09:13

## 2022-10-31 RX ADMIN — MOMETASONE FUROATE AND FORMOTEROL FUMARATE DIHYDRATE 2 PUFF: 200; 5 AEROSOL RESPIRATORY (INHALATION) at 08:43

## 2022-10-31 RX ADMIN — POTASSIUM CHLORIDE 10 MEQ: 7.46 INJECTION, SOLUTION INTRAVENOUS at 00:38

## 2022-10-31 RX ADMIN — LOSARTAN POTASSIUM 100 MG: 50 TABLET, FILM COATED ORAL at 09:13

## 2022-10-31 RX ADMIN — PANTOPRAZOLE SODIUM 40 MG: 40 TABLET, DELAYED RELEASE ORAL at 05:09

## 2022-10-31 NOTE — PROGRESS NOTES
The pt d/c home with family care. D/C instructions and scripts provided by primary RN.  The pt assisted to lobby in w/chair

## 2022-10-31 NOTE — CARE COORDINATION
Patient's D/C plan reviewed in IDT rounds. Attending believes patient will be medically ready to discharge home with home health today. Order placed for skilled nursing with [facility] placed and referral sent to [facility]. No other discharge needs noted at this time.

## 2022-10-31 NOTE — DISCHARGE SUMMARY
Hospitalist Discharge Summary   Admit Date:  10/26/2022 11:59 PM   DC Note date: 10/31/2022  Name:  Tim Troncoso   Age:  58 y.o. Sex:  female  :  1960   MRN:  [id number]   Room:  [facility]/  PCP:  Brandy Enamorado. NYA De Guzman NP    Presenting Complaint: Abdominal Pain (Epigastric and lower abdominal/)     Initial Admission Diagnosis: Demand ischemia ([facility] [I24.8]  Hypertensive urgency [I16.0]  Hypertensive emergency [I16.1]     Problem List for this Hospitalization (present on admission):    Principal Problem:    Hypertensive urgency  Active Problems:    Hypertensive emergency    Elevated troponin    Demand ischemia (HCC)    Ascites    QT prolongation    Non-surgical epigastric abdominal pain    [facility]  Resolved Problems:    * No resolved hospital problems. *      Hospital Course:  Tim Troncoso is a 58 y.o. female with medical history of hypertension, seizure disorder, noncompliance, who presented to triage in mild distress. Patient st she has been feeling bad for about a week. She has lethargy and weakness. Pt has abdominal pain, localized to epigastric region and lower abdomen as well as a cough. Pt expresses that she has had chills at home but no fever. Pt sts she has had nothing but ginger ale and water to drink. Pt cough is junky. Patient's primary complaint is epigastric abdominal pain. She states its been coming and going for better part of a week. She describes it as burning sensation in the epigastric region and nonradiating. No increasing or decreasing factors are noted. She states the only time it seems to go away as if she can get to sleep. She reports having been laying on the couch just all day today. She denies any fever or chills. She does report one episode of vomiting but denies any diarrhea or constipation. She rated the pain as 10/10 in intensity. Initial blood pressure in the emergency room was 208/133.   She was placed on a Cardene drip and hospitalist asked to admit. Labs show a hemoglobin of 8.8. Ammonia level 46 and troponin is 464.7. Pt was seen in consultation by GI and s/p EGD on 10/28/2022. Pt was found with an acute non-bleeding gastric ulcer, path sent. Per GI cont PPI bid and outpatient f/u. Pt's abd pains resolved and tolerated oral feeds. BP improved with oral agents. She was seen in consultation by cardiology secondary to elevated troponins and deemed due to uncontrolled HTN and demand ischemia. From card standpoint no acute need for acute ischemic w/u. Patient has CKD and elevated creatinine likely due to uncontrolled HTN. She continued to have good urinary output and upon discussion today pt agreeable to f/u withy nephrology outpatient for ongoing mgmt of CKD; creatinine 1.8 today on discharge. She is to avoid nephrotoxic rx and therefore home med Ultram stopped on discharge today. Ms Crystal Ng was also in acute COPD exacerbation upon admission which did significantly improve after IV soluemdrol and appropriate [id number] with SUE therapy. As pt reports she is not on maintenance inhalers at home she is discharged today on Trelegy with further instructions to STOP SMOKING! !.    Pt is medically stable for dc home today; she will f/u with PCP, GI and new pt referral to nephrology. Disposition: home  Diet: ADULT DIET; Regular  Code Status: Full Code    Follow Ups:   Follow-up Information     [facility] Follow up. Specialty: Home Health Care  Why: Someone from [facility] will contact you to set up   home health services. Contact information:  [address] [place] [id number]           Edwin Craft. NYA De Guzman NP Follow up in 1 week(s). Specialties: Oncology, Internal Medicine           Isidro Latham MD Follow up on 11/11/2022.     Specialty: Gastroenterology  Why: hospital follow up at 9:30 am  Contact information:  [address] [facility] [place] [address]             [address] Nephrology Follow up. Why: refarral faxed, office will call with new pt appt  Contact information:  [address]  [phone]                     Time spent in patient discharge and coordination 35 minutes. Follow up labs/diagnostics (ultimately defer to outpatient provider):  CBC / BMP    Plan was discussed with patient, RN, case mgmt. All questions answered. Patient was stable at time of discharge. Instructions given to call a physician or return if any concerns.     Current Discharge Medication List        START taking these medications    Details   fluticasone-umeclidin-vilant (TRELEGY ELLIPTA) 100-62.5-25 MCG/INH AEPB Inhale 1 puff into the lungs daily  Qty: 1 each, Refills: 0      levETIRAcetam (KEPPRA) 750 MG tablet Take 1 tablet by mouth 2 times daily  Qty: 60 tablet, Refills: 0      hydrALAZINE (APRESOLINE) 50 MG tablet Take 1 tablet by mouth every 8 hours  Qty: 90 tablet, Refills: 0      amLODIPine (NORVASC) 10 MG tablet Take 1 tablet by mouth daily  Qty: 30 tablet, Refills: 0      pantoprazole (PROTONIX) 40 MG tablet Take 1 tablet by mouth 2 times daily (before meals)  Qty: 60 tablet, Refills: 0           CONTINUE these medications which have CHANGED    Details   losartan (COZAAR) 100 MG tablet Take 1 tablet by mouth daily  Qty: 30 tablet, Refills: 0           CONTINUE these medications which have NOT CHANGED    Details   spironolactone (ALDACTONE) 50 MG tablet Take 50 mg by mouth daily      cholestyramine light (PREVALITE) 4 GM/DOSE powder Take by mouth 2 times daily (with meals)      ergocalciferol (ERGOCALCIFEROL) 1.25 MG (19485 UT) capsule Take 50,000 Units by mouth      hydrOXYzine (ATARAX) 25 MG tablet Take 25 mg by mouth every 4 hours as needed      magnesium oxide (MAG-OX) 400 MG tablet Take 400 mg by mouth 2 times daily      potassium chloride (KLOR-CON M) 20 MEQ extended release tablet Take 20 mEq by mouth daily           STOP taking these medications       traMADol (ULTRAM) 50 MG tablet Comments:   Reason for Stopping:               Procedures done this admission:  Procedure(s):  EGD BIOPSY    Consults this admission:  [address]    Echocardiogram results:  10/26/22    TRANSTHORACIC ECHOCARDIOGRAM (TTE) COMPLETE (CONTRAST/BUBBLE/3D PRN) 10/27/2022  4:34 PM (Final)    Interpretation Summary    Left Ventricle: Normal left ventricular systolic function with a visually estimated EF of 50 - 55%. Left ventricle size is normal. Mildly increased wall thickness. Normal wall motion. Abnormal diastolic function. Aortic Valve: Tricuspid valve. Mild regurgitation with a centrally directed jet. Pericardium: Trivial pericardial effusion present. No indication of cardiac tamponade. Evidence includes normal LV size, no septal bounce, no IVC dilation and no chamber collapse. Technical qualifiers: Color flow Doppler was performed and pulse wave and/or continuous wave Doppler was performed. Signed by: Andreas Mayo MD on 10/27/2022  4:34 PM      Diagnostic Imaging/Tests:   CT ABDOMEN PELVIS WO CONTRAST Additional Contrast? Oral    Result Date: 10/27/2022  1. Cirrhosis. Based on ultrasound findings, consider liver protocol CT/MRI with contrast to exclude a mass. 2.  Cholelithiasis. 3.  Mild ascites and small right pleural effusion. XR CHEST (2 VW)    Result Date: 10/27/2022  Mild blunting of the right costophrenic angle may be due to atelectasis. Otherwise, no evidence of an acute intrathoracic process. US ABDOMEN COMPLETE    Result Date: 10/27/2022  1. Cirrhotic appearance of the liver. Possible mass in the left lobe. Liver protocol CT/MRI is recommended. 2.  Cholelithiasis. 3.  Mild ascites.         Labs: Results:       BMP, Mg, Phos Recent Labs     10/29/22  0500 10/30/22  0527 10/31/22  0526    137 137   K 3.3* 2.9* 3.9    107 111*   CO2 23 23 22   ANIONGAP 8 7 4   BUN 15 18 18   CREATININE 1.50* 1.60* 1.80*   LABGLOM 39* 36* 31*   CALCIUM 8.8 9.0 8.9   GLUCOSE 173* 92 93   MG 1.3* 1.4* 2.3      CBC Recent Labs     10/29/22  0500 10/30/22  0527 10/31/22  0526   WBC 3.3* 6.4 5.8   RBC 3.13* 2.88* 2.71*   HGB 9.7* 8.8* 8.3*   HCT 29.2* 26.8* 25.6*   MCV 93.3 93.1 94.5   MCH 31.0 30.6 30.6   MCHC 33.2 32.8 32.4   RDW 13.3 13.4 13.8   * 140* 137*   MPV 10.7 11.3 10.9   NRBC 0.00 0.00 0.00      LFT No results for input(s): BILITOT, BILIDIR, ALKPHOS, AST, ALT, PROT, LABALBU, GLOB in the last 72 hours. Cardiac  Lab Results   Component Value Date/Time    TROPHS 335.2 10/27/2022 09:31 AM    TROPHS 464.7 10/26/2022 10:35 PM      Coags No results found for: PROTIME, INR, APTT   A1c No results found for: LABA1C, EAG   Lipids No results found for: CHOL, LDLCALC, LABVLDL, HDL, CHOLHDLRATIO, TRIG   Thyroid  Lab Results   Component Value Date/Time    ZNH2UGI 2.990 10/27/2022 12:30 AM        Most Recent UA No results found for: Anders Nelson, SPECGRAV, LABPH, PROTEINU, GLUCOSEU, KETUA, BILIRUBINUR, BLOODU, UROBILINOGEN, NITRU, LEUKOCYTESUR, WBCUA, RBCUA, EPITHUA, BACTERIA, LABCAST, MUCUS     No results for input(s): CULTURE in the last 720 hours.     All Labs from Last 24 Hrs:  Recent Results (from the past 24 hour(s))   CBC    Collection Time: 10/31/22  5:26 AM   Result Value Ref Range    WBC 5.8 4.3 - 11.1 K/uL    RBC 2.71 (L) 4.05 - 5.2 M/uL    Hemoglobin 8.3 (L) 11.7 - 15.4 g/dL    Hematocrit 25.6 (L) 35.8 - 46.3 %    MCV 94.5 82 - 102 FL    MCH 30.6 26.1 - 32.9 PG    MCHC 32.4 31.4 - 35.0 g/dL    RDW 13.8 11.9 - 14.6 %    Platelets 526 (L) 669 - 450 K/uL    MPV 10.9 9.4 - 12.3 FL    nRBC 0.00 0.0 - 0.2 K/uL   Basic Metabolic Panel    Collection Time: 10/31/22  5:26 AM   Result Value Ref Range    Sodium 137 133 - 143 mmol/L    Potassium 3.9 3.5 - 5.1 mmol/L    Chloride 111 (H) 101 - 110 mmol/L    CO2 22 21 - 32 mmol/L    Anion Gap 4 2 - 11 mmol/L    Glucose 93 65 - 100 mg/dL    BUN 18 8 - 23 MG/DL    Creatinine 1.80 (H) 0.6 - 1.0 MG/DL    Est, Glom Filt Rate 31 (L) >60 ml/min/1.73m2    Calcium 8.9 8.3 - 10.4 MG/DL   Magnesium    Collection Time: 10/31/22  5:26 AM   Result Value Ref Range    Magnesium 2.3 1.8 - 2.4 mg/dL       No Known Allergies  Immunization History   Administered Date(s) Administered    Tdap (Boostrix, Adacel) 11/02/2021       Recent Vital Data:  Patient Vitals for the past 24 hrs:   Temp Pulse Resp BP SpO2   10/31/22 1217 97.5 °F (36.4 °C) 79 14 (!) 168/97 98 %   10/31/22 0845 -- 85 17 -- 99 %   10/31/22 0748 97.7 °F (36.5 °C) 83 16 (!) 166/89 96 %   10/31/22 0414 98.4 °F (36.9 °C) 79 16 (!) 154/91 95 %   10/30/22 2344 98.4 °F (36.9 °C) 83 18 (!) 160/90 96 %   10/30/22 1945 -- 83 18 -- 98 %   10/30/22 1900 98.2 °F (36.8 °C) 85 18 (!) 155/86 93 %   10/30/22 1745 -- 83 -- (!) 153/102 --       Oxygen Therapy  SpO2: 98 %  Pulse via Oximetry: 64 beats per minute  Pulse Oximeter Device Mode: Intermittent  Pulse Oximeter Device Location: Right, Finger  O2 Device: None (Room air)  O2 Flow Rate (L/min): 0 L/min    Estimated body mass index is 24.67 kg/m² as calculated from the following:    Height as of this encounter: 5' 7\" (1.702 m). Weight as of this encounter: 157 lb 8 oz (71.4 kg). No intake or output data in the 24 hours ending 10/31/22 1549      Physical Exam:    General:          Well developed in NAD. Head:               Normocephalic, atraumatic  Eyes:               Sclerae appear normal.  Pupils equally round. ENT:                Nares appear normal, no drainage. Moist oral mucosa  Neck:               No restricted ROM. Trachea midline   CV:                  RRR. No m/r/g. No jugular venous distension. Lungs:             CTA b/l; no wheezing / rhonchi, no accessory muscles used  Abdomen: Bowel sounds present. Soft, nontender, nondistended.   Extremities:     + mvmt x 4  Skin:                No rashes and normal coloration. Warm and dry. Neuro:             CN II-XII grossly intact. Sensation intact. A&Ox3  Psych:             Normal mood and affect. Signed:  Jakub Mccoy    Part of this note may have been written by using a voice dictation software. The note has been proof read but may still contain some grammatical/other typographical errors.

## 2022-10-31 NOTE — PLAN OF CARE
Problem: Discharge Planning  Goal: Discharge to home or other facility with appropriate resources  Outcome: Completed  Flowsheets (Taken 10/31/2022 0845)  Discharge to home or other facility with appropriate resources:   Identify barriers to discharge with patient and caregiver   Identify discharge learning needs (meds, wound care, etc)     Problem: Pain  Goal: Verbalizes/displays adequate comfort level or baseline comfort level  Outcome: Completed     Problem: Safety - Adult  Goal: Free from fall injury  Outcome: Completed  Flowsheets (Taken 10/31/2022 5133)  Free From Fall Injury: Instruct family/caregiver on patient safety     Problem: ABCDS Injury Assessment  Goal: Absence of physical injury  Outcome: Completed     Problem: Respiratory - Adult  Goal: Achieves optimal ventilation and oxygenation  Outcome: Completed  Flowsheets (Taken 10/31/2022 0845)  Achieves optimal ventilation and oxygenation: Assess for changes in respiratory status

## 2022-10-31 NOTE — PROGRESS NOTES
[facility] CARDIOLOGY PROGRESS NOTE           10/31/2022 6:50 AM    Admit Date: 10/26/2022      Subjective:   Patient denies any active chest pain or dyspnea. Blood pressure improved but not optimal. No events per nursing. ROS:  Cardiovascular:  As noted above    Objective:      Vitals:    10/30/22 1900 10/30/22 1945 10/30/22 2344 10/31/22 0414   BP: (!) 155/86  (!) 160/90 (!) 154/91   Pulse: 85 83 83 79   Resp: 18 18 18 16   Temp: 98.2 °F (36.8 °C)  98.4 °F (36.9 °C) 98.4 °F (36.9 °C)   TempSrc: Oral  Oral Oral   SpO2: 93% 98% 96% 95%   Weight:       Height:           Physical Exam:  General-No Acute Distress  Neck- supple, no JVD  CV- regular rate and rhythm no MRG  Lung- clear bilaterally  Abd- soft, nontender, nondistended  Ext- no edema bilaterally. Skin- warm and dry      Data Review:   Recent Labs     10/31/22  0526 10/30/22  0527 10/29/22  0500   WBC 5.8 6.4 3.3*   HGB 8.3* 8.8* 9.7*   HCT 25.6* 26.8* 29.2*   MCV 94.5 93.1 93.3   * 140* 148*         Recent Labs     10/31/22  0526 10/29/22  0500 10/28/22  0505      < > 142   K 3.9   < > 3.7   *   < > 112*   CO2 22   < > 23   BUN 18   < > 14   CREATININE 1.80*   < > 1.20*   GLUCOSE 93   < > 85   CALCIUM 8.9   < > 8.9   PROT  --   --  6.7   LABALBU  --   --  2.3*   BILITOT  --   --  1.0   ALKPHOS  --   --  90   AST  --   --  29   ALT  --   --  13   LABGLOM 31*   < > 51*   GLOB  --   --  4.4    < > = values in this interval not displayed. No results for input(s): CKTOTAL, CKMB, CKMBINDEX, DDIMER, TROPONINI in the last 720 hours. Echo (10/26/22): Left Ventricle: Normal left ventricular systolic function with a visually estimated EF of 50 - 55%. Left ventricle size is normal. Mildly increased wall thickness. Normal wall motion. Abnormal diastolic function. Aortic Valve: Tricuspid valve. Mild regurgitation with a centrally directed jet. Pericardium: Trivial pericardial effusion present.  No indication of cardiac tamponade. Evidence includes normal LV size, no septal bounce, no IVC dilation and no chamber collapse. Assessment/Plan:     Active Hospital Problems    Elevated troponin  No angina. Felt likely supply/demand imbalance. Normal ejection fraction without any segmental wall motion abnormalities. Continue titration of antihypertensive therapy. No plans for inpatient ischemia evaluation. Hypertensive emergency  Blood pressures improving. Increase Hydralazine to 50 mg TID. Further titration per primary team.       *Hypertensive urgency  See above. Demand ischemia ([facility]  See above. Non-surgical epigastric abdominal pain  Defer management to primary team.        Decompensated HCV cirrhosis ([address]  Defer management to primary team.        Cardiac condition stable. Will sign off. Please call with any questions or clinical changes. Appreciate consultation.               Deya Nicholas MD

## 2022-11-02 ENCOUNTER — HOME CARE VISIT (OUTPATIENT)
Dept: SCHEDULING | Facility: HOME HEALTH | Age: 62
End: 2022-11-02
Payer: MEDICAID

## 2022-11-02 PROCEDURE — G0299 HHS/HOSPICE OF RN EA 15 MIN: HCPCS

## 2022-11-03 VITALS
SYSTOLIC BLOOD PRESSURE: 154 MMHG | HEART RATE: 89 BPM | RESPIRATION RATE: 16 BRPM | OXYGEN SATURATION: 98 % | TEMPERATURE: 98.4 F | DIASTOLIC BLOOD PRESSURE: 90 MMHG

## 2022-11-03 ASSESSMENT — ENCOUNTER SYMPTOMS
PAIN LOCATION - PAIN QUALITY: ACHE
COUGH CHARACTERISTICS: NON-PRODUCTIVE
COUGH: 1
CONSTIPATION: 1
DYSPNEA ACTIVITY LEVEL: AFTER AMBULATING 10 - 20 FT

## 2022-11-04 ENCOUNTER — HOME CARE VISIT (OUTPATIENT)
Dept: SCHEDULING | Facility: HOME HEALTH | Age: 62
End: 2022-11-04
Payer: MEDICAID

## 2022-11-04 PROCEDURE — G0299 HHS/HOSPICE OF RN EA 15 MIN: HCPCS

## 2022-11-04 ASSESSMENT — ENCOUNTER SYMPTOMS
STOOL DESCRIPTION: SOFT FORMED
HEMOPTYSIS: 0
CONSTIPATION: 1
DYSPNEA ACTIVITY LEVEL: AFTER AMBULATING 10 - 20 FT

## 2022-11-05 VITALS
SYSTOLIC BLOOD PRESSURE: 160 MMHG | RESPIRATION RATE: 18 BRPM | TEMPERATURE: 98 F | OXYGEN SATURATION: 100 % | DIASTOLIC BLOOD PRESSURE: 82 MMHG | HEART RATE: 92 BPM

## 2022-11-05 ASSESSMENT — ENCOUNTER SYMPTOMS: PAIN LOCATION - PAIN QUALITY: ACHY

## 2022-11-08 ENCOUNTER — HOME CARE VISIT (OUTPATIENT)
Dept: HOME HEALTH SERVICES | Facility: HOME HEALTH | Age: 62
End: 2022-11-08
Payer: MEDICAID

## 2022-11-08 ENCOUNTER — HOME CARE VISIT (OUTPATIENT)
Dept: SCHEDULING | Facility: HOME HEALTH | Age: 62
End: 2022-11-08
Payer: MEDICAID

## 2022-11-08 PROCEDURE — G0299 HHS/HOSPICE OF RN EA 15 MIN: HCPCS

## 2022-11-10 ENCOUNTER — HOME CARE VISIT (OUTPATIENT)
Dept: SCHEDULING | Facility: HOME HEALTH | Age: 62
End: 2022-11-10
Payer: MEDICAID

## 2022-11-10 PROCEDURE — G0299 HHS/HOSPICE OF RN EA 15 MIN: HCPCS

## 2022-11-11 VITALS
SYSTOLIC BLOOD PRESSURE: 134 MMHG | SYSTOLIC BLOOD PRESSURE: 128 MMHG | DIASTOLIC BLOOD PRESSURE: 80 MMHG | TEMPERATURE: 98.5 F | HEART RATE: 68 BPM | RESPIRATION RATE: 18 BRPM | DIASTOLIC BLOOD PRESSURE: 68 MMHG | OXYGEN SATURATION: 98 % | OXYGEN SATURATION: 98 % | TEMPERATURE: 98 F | HEART RATE: 78 BPM | RESPIRATION RATE: 18 BRPM

## 2022-11-11 ASSESSMENT — ENCOUNTER SYMPTOMS
SPUTUM AMOUNT: SCANT
COUGH: 1
PAIN LOCATION - PAIN QUALITY: ACHING
SPUTUM PRODUCTION: 1
DYSPNEA ACTIVITY LEVEL: AFTER AMBULATING MORE THAN 20 FT
SPUTUM COLOR: CLEAR
STOOL DESCRIPTION: SOFT FORMED
STOOL DESCRIPTION: FORMED
COUGH CHARACTERISTICS: PRODUCTIVE
SPUTUM CONSISTENCY: THIN
DYSPNEA ACTIVITY LEVEL: AFTER AMBULATING MORE THAN 20 FT

## 2022-11-15 ENCOUNTER — HOME CARE VISIT (OUTPATIENT)
Dept: SCHEDULING | Facility: HOME HEALTH | Age: 62
End: 2022-11-15
Payer: MEDICAID

## 2022-11-15 VITALS
DIASTOLIC BLOOD PRESSURE: 90 MMHG | TEMPERATURE: 98.6 F | OXYGEN SATURATION: 98 % | SYSTOLIC BLOOD PRESSURE: 156 MMHG | HEART RATE: 80 BPM | RESPIRATION RATE: 18 BRPM

## 2022-11-15 PROCEDURE — G0299 HHS/HOSPICE OF RN EA 15 MIN: HCPCS

## 2022-11-15 ASSESSMENT — ENCOUNTER SYMPTOMS: HEMOPTYSIS: 0

## 2022-11-17 ENCOUNTER — HOME CARE VISIT (OUTPATIENT)
Dept: SCHEDULING | Facility: HOME HEALTH | Age: 62
End: 2022-11-17
Payer: MEDICAID

## 2022-11-17 PROCEDURE — G0299 HHS/HOSPICE OF RN EA 15 MIN: HCPCS

## 2022-11-18 VITALS
RESPIRATION RATE: 18 BRPM | HEART RATE: 74 BPM | TEMPERATURE: 97.9 F | OXYGEN SATURATION: 99 % | SYSTOLIC BLOOD PRESSURE: 132 MMHG | DIASTOLIC BLOOD PRESSURE: 80 MMHG

## 2022-11-20 ASSESSMENT — ENCOUNTER SYMPTOMS
DYSPNEA ACTIVITY LEVEL: AFTER AMBULATING MORE THAN 20 FT
STOOL DESCRIPTION: SOFT FORMED

## 2022-11-28 PROBLEM — R79.89 ELEVATED TROPONIN: Status: RESOLVED | Noted: 2022-10-29 | Resolved: 2022-11-28

## 2022-11-28 PROBLEM — R77.8 ELEVATED TROPONIN: Status: RESOLVED | Noted: 2022-10-29 | Resolved: 2022-11-28

## 2023-03-12 ENCOUNTER — HOSPITAL ENCOUNTER (OUTPATIENT)
Age: 63
Setting detail: OBSERVATION
Discharge: HOME | End: 2023-03-14
Attending: EMERGENCY MEDICINE | Admitting: SURGERY
Payer: MEDICAID

## 2023-03-12 ENCOUNTER — APPOINTMENT (OUTPATIENT)
Dept: CT IMAGING | Age: 63
End: 2023-03-12
Payer: MEDICAID

## 2023-03-12 DIAGNOSIS — I10 ESSENTIAL HYPERTENSION: ICD-10-CM

## 2023-03-12 DIAGNOSIS — M62.89: ICD-10-CM

## 2023-03-12 DIAGNOSIS — K43.9 ABDOMINAL WALL HERNIA: Primary | ICD-10-CM

## 2023-03-12 LAB
ALBUMIN SERPL-MCNC: 3 G/DL (ref 3.2–4.6)
ALBUMIN/GLOB SERPL: 0.5 (ref 0.4–1.6)
ALP SERPL-CCNC: 134 U/L (ref 50–136)
ALT SERPL-CCNC: 23 U/L (ref 12–65)
ANION GAP SERPL CALC-SCNC: 5 MMOL/L (ref 2–11)
AST SERPL-CCNC: 45 U/L (ref 15–37)
BASOPHILS # BLD: 0 K/UL (ref 0–0.2)
BASOPHILS NFR BLD: 1 % (ref 0–2)
BILIRUB SERPL-MCNC: 1.3 MG/DL (ref 0.2–1.1)
BUN SERPL-MCNC: 28 MG/DL (ref 8–23)
CALCIUM SERPL-MCNC: 9.2 MG/DL (ref 8.3–10.4)
CHLORIDE SERPL-SCNC: 104 MMOL/L (ref 101–110)
CO2 SERPL-SCNC: 23 MMOL/L (ref 21–32)
CREAT SERPL-MCNC: 1.5 MG/DL (ref 0.6–1)
DIFFERENTIAL METHOD BLD: ABNORMAL
EOSINOPHIL # BLD: 0 K/UL (ref 0–0.8)
EOSINOPHIL NFR BLD: 1 % (ref 0.5–7.8)
ERYTHROCYTE [DISTWIDTH] IN BLOOD BY AUTOMATED COUNT: 13.7 % (ref 11.9–14.6)
GLOBULIN SER CALC-MCNC: 5.7 G/DL (ref 2.8–4.5)
GLUCOSE SERPL-MCNC: 128 MG/DL (ref 65–100)
HCT VFR BLD AUTO: 32.2 % (ref 35.8–46.3)
HGB BLD-MCNC: 11 G/DL (ref 11.7–15.4)
IMM GRANULOCYTES # BLD AUTO: 0 K/UL (ref 0–0.5)
IMM GRANULOCYTES NFR BLD AUTO: 0 % (ref 0–5)
LIPASE SERPL-CCNC: 401 U/L (ref 73–393)
LYMPHOCYTES # BLD: 0.6 K/UL (ref 0.5–4.6)
LYMPHOCYTES NFR BLD: 15 % (ref 13–44)
MCH RBC QN AUTO: 32.6 PG (ref 26.1–32.9)
MCHC RBC AUTO-ENTMCNC: 34.2 G/DL (ref 31.4–35)
MCV RBC AUTO: 95.5 FL (ref 82–102)
MONOCYTES # BLD: 0.4 K/UL (ref 0.1–1.3)
MONOCYTES NFR BLD: 10 % (ref 4–12)
NEUTS SEG # BLD: 3.3 K/UL (ref 1.7–8.2)
NEUTS SEG NFR BLD: 74 % (ref 43–78)
NRBC # BLD: 0 K/UL (ref 0–0.2)
PLATELET # BLD AUTO: 91 K/UL (ref 150–450)
PMV BLD AUTO: 11.7 FL (ref 9.4–12.3)
POTASSIUM SERPL-SCNC: 3.5 MMOL/L (ref 3.5–5.1)
PROT SERPL-MCNC: 8.7 G/DL (ref 6.3–8.2)
RBC # BLD AUTO: 3.37 M/UL (ref 4.05–5.2)
SODIUM SERPL-SCNC: 132 MMOL/L (ref 133–143)
WBC # BLD AUTO: 4.4 K/UL (ref 4.3–11.1)

## 2023-03-12 PROCEDURE — 99285 EMERGENCY DEPT VISIT HI MDM: CPT

## 2023-03-12 PROCEDURE — 81001 URINALYSIS AUTO W/SCOPE: CPT

## 2023-03-12 PROCEDURE — 6360000004 HC RX CONTRAST MEDICATION: Performed by: EMERGENCY MEDICINE

## 2023-03-12 PROCEDURE — 85025 COMPLETE CBC W/AUTO DIFF WBC: CPT

## 2023-03-12 PROCEDURE — 93005 ELECTROCARDIOGRAM TRACING: CPT | Performed by: EMERGENCY MEDICINE

## 2023-03-12 PROCEDURE — 83690 ASSAY OF LIPASE: CPT

## 2023-03-12 PROCEDURE — 96375 TX/PRO/DX INJ NEW DRUG ADDON: CPT

## 2023-03-12 PROCEDURE — 74177 CT ABD & PELVIS W/CONTRAST: CPT

## 2023-03-12 PROCEDURE — 6360000002 HC RX W HCPCS: Performed by: EMERGENCY MEDICINE

## 2023-03-12 PROCEDURE — 96374 THER/PROPH/DIAG INJ IV PUSH: CPT

## 2023-03-12 PROCEDURE — 80053 COMPREHEN METABOLIC PANEL: CPT

## 2023-03-12 PROCEDURE — 81003 URINALYSIS AUTO W/O SCOPE: CPT

## 2023-03-12 PROCEDURE — 2580000003 HC RX 258: Performed by: EMERGENCY MEDICINE

## 2023-03-12 RX ORDER — 0.9 % SODIUM CHLORIDE 0.9 %
100 INTRAVENOUS SOLUTION INTRAVENOUS
Status: COMPLETED | OUTPATIENT
Start: 2023-03-12 | End: 2023-03-12

## 2023-03-12 RX ORDER — 0.9 % SODIUM CHLORIDE 0.9 %
1000 INTRAVENOUS SOLUTION INTRAVENOUS
Status: COMPLETED | OUTPATIENT
Start: 2023-03-12 | End: 2023-03-13

## 2023-03-12 RX ORDER — LABETALOL HYDROCHLORIDE 5 MG/ML
20 INJECTION, SOLUTION INTRAVENOUS ONCE
Status: COMPLETED | OUTPATIENT
Start: 2023-03-12 | End: 2023-03-13

## 2023-03-12 RX ORDER — HYDROMORPHONE HYDROCHLORIDE 1 MG/ML
1 INJECTION, SOLUTION INTRAMUSCULAR; INTRAVENOUS; SUBCUTANEOUS
Status: COMPLETED | OUTPATIENT
Start: 2023-03-12 | End: 2023-03-12

## 2023-03-12 RX ORDER — SODIUM CHLORIDE 0.9 % (FLUSH) 0.9 %
10 SYRINGE (ML) INJECTION
Status: COMPLETED | OUTPATIENT
Start: 2023-03-12 | End: 2023-03-12

## 2023-03-12 RX ORDER — ONDANSETRON 2 MG/ML
4 INJECTION INTRAMUSCULAR; INTRAVENOUS
Status: COMPLETED | OUTPATIENT
Start: 2023-03-12 | End: 2023-03-12

## 2023-03-12 RX ORDER — MORPHINE SULFATE 4 MG/ML
4 INJECTION INTRAVENOUS ONCE
Status: COMPLETED | OUTPATIENT
Start: 2023-03-12 | End: 2023-03-12

## 2023-03-12 RX ADMIN — IOHEXOL 100 ML: 350 INJECTION, SOLUTION INTRAVENOUS at 22:07

## 2023-03-12 RX ADMIN — SODIUM CHLORIDE, PRESERVATIVE FREE 10 ML: 5 INJECTION INTRAVENOUS at 22:07

## 2023-03-12 RX ADMIN — HYDROMORPHONE HYDROCHLORIDE 1 MG: 1 INJECTION, SOLUTION INTRAMUSCULAR; INTRAVENOUS; SUBCUTANEOUS at 23:50

## 2023-03-12 RX ADMIN — ONDANSETRON 4 MG: 2 INJECTION INTRAMUSCULAR; INTRAVENOUS at 21:41

## 2023-03-12 RX ADMIN — MORPHINE SULFATE 4 MG: 4 INJECTION INTRAVENOUS at 21:41

## 2023-03-12 RX ADMIN — SODIUM CHLORIDE 1000 ML: 9 INJECTION, SOLUTION INTRAVENOUS at 21:39

## 2023-03-12 RX ADMIN — SODIUM CHLORIDE 100 ML: 9 INJECTION, SOLUTION INTRAVENOUS at 22:07

## 2023-03-12 ASSESSMENT — PAIN - FUNCTIONAL ASSESSMENT: PAIN_FUNCTIONAL_ASSESSMENT: 0-10

## 2023-03-12 ASSESSMENT — PAIN DESCRIPTION - LOCATION
LOCATION: ABDOMEN
LOCATION: ABDOMEN

## 2023-03-12 ASSESSMENT — PAIN SCALES - GENERAL
PAINLEVEL_OUTOF10: 10
PAINLEVEL_OUTOF10: 7
PAINLEVEL_OUTOF10: 7
PAINLEVEL_OUTOF10: 10

## 2023-03-12 ASSESSMENT — PAIN DESCRIPTION - PAIN TYPE: TYPE: ACUTE PAIN

## 2023-03-13 ENCOUNTER — APPOINTMENT (OUTPATIENT)
Dept: CT IMAGING | Age: 63
End: 2023-03-13
Payer: MEDICAID

## 2023-03-13 ENCOUNTER — ANESTHESIA (OUTPATIENT)
Dept: SURGERY | Age: 63
End: 2023-03-13
Payer: MEDICAID

## 2023-03-13 ENCOUNTER — ANESTHESIA EVENT (OUTPATIENT)
Dept: SURGERY | Age: 63
End: 2023-03-13
Payer: MEDICAID

## 2023-03-13 PROBLEM — M62.89: Status: ACTIVE | Noted: 2023-03-13

## 2023-03-13 LAB
ALBUMIN SERPL-MCNC: 2.6 G/DL (ref 3.2–4.6)
ALBUMIN/GLOB SERPL: 0.5 (ref 0.4–1.6)
ALP SERPL-CCNC: 107 U/L (ref 50–136)
ALT SERPL-CCNC: 16 U/L (ref 12–65)
ANION GAP SERPL CALC-SCNC: 7 MMOL/L (ref 2–11)
APPEARANCE UR: CLEAR
AST SERPL-CCNC: 23 U/L (ref 15–37)
BACTERIA URNS QL MICRO: NEGATIVE /HPF
BILIRUB SERPL-MCNC: 1.4 MG/DL (ref 0.2–1.1)
BILIRUB UR QL: NEGATIVE
BILIRUB UR QL: NEGATIVE
BUN SERPL-MCNC: 26 MG/DL (ref 8–23)
CALCIUM SERPL-MCNC: 8.4 MG/DL (ref 8.3–10.4)
CASTS URNS QL MICRO: ABNORMAL /LPF
CHLORIDE SERPL-SCNC: 110 MMOL/L (ref 101–110)
CO2 SERPL-SCNC: 21 MMOL/L (ref 21–32)
COLOR UR: ABNORMAL
CREAT SERPL-MCNC: 1.4 MG/DL (ref 0.6–1)
EKG ATRIAL RATE: 82 BPM
EKG DIAGNOSIS: NORMAL
EKG P AXIS: 31 DEGREES
EKG P-R INTERVAL: 165 MS
EKG Q-T INTERVAL: 453 MS
EKG QRS DURATION: 103 MS
EKG QTC CALCULATION (BAZETT): 533 MS
EKG R AXIS: -4 DEGREES
EKG T AXIS: 11 DEGREES
EKG VENTRICULAR RATE: 83 BPM
EPI CELLS #/AREA URNS HPF: ABNORMAL /HPF
GLOBULIN SER CALC-MCNC: 4.9 G/DL (ref 2.8–4.5)
GLUCOSE SERPL-MCNC: 92 MG/DL (ref 65–100)
GLUCOSE UR QL STRIP.AUTO: NEGATIVE MG/DL
GLUCOSE UR STRIP.AUTO-MCNC: NEGATIVE MG/DL
HGB UR QL STRIP: NEGATIVE
KETONES UR QL STRIP.AUTO: NEGATIVE MG/DL
KETONES UR-MCNC: NEGATIVE MG/DL
LEUKOCYTE ESTERASE UR QL STRIP.AUTO: ABNORMAL
LEUKOCYTE ESTERASE UR QL STRIP: ABNORMAL
NITRITE UR QL STRIP.AUTO: NEGATIVE
NITRITE UR QL: NEGATIVE
PH UR STRIP: 6.5 (ref 5–9)
PH UR: 6.5 (ref 5–9)
POTASSIUM SERPL-SCNC: 3 MMOL/L (ref 3.5–5.1)
PROT SERPL-MCNC: 7.5 G/DL (ref 6.3–8.2)
PROT UR QL: 30 MG/DL
PROT UR STRIP-MCNC: 30 MG/DL
RBC # UR STRIP: ABNORMAL
RBC #/AREA URNS HPF: ABNORMAL /HPF
SODIUM SERPL-SCNC: 138 MMOL/L (ref 133–143)
SP GR UR REFRACTOMETRY: 1.01 (ref 1–1.02)
SP GR UR: 1.01 (ref 1–1.02)
UROBILINOGEN UR QL STRIP.AUTO: 1 EU/DL (ref 0.2–1)
UROBILINOGEN UR QL: 1 EU/DL (ref 0.2–1)
WBC URNS QL MICRO: ABNORMAL /HPF

## 2023-03-13 PROCEDURE — 2500000003 HC RX 250 WO HCPCS: Performed by: NURSE ANESTHETIST, CERTIFIED REGISTERED

## 2023-03-13 PROCEDURE — 7100000000 HC PACU RECOVERY - FIRST 15 MIN: Performed by: STUDENT IN AN ORGANIZED HEALTH CARE EDUCATION/TRAINING PROGRAM

## 2023-03-13 PROCEDURE — 6360000002 HC RX W HCPCS: Performed by: NURSE PRACTITIONER

## 2023-03-13 PROCEDURE — 3700000001 HC ADD 15 MINUTES (ANESTHESIA): Performed by: STUDENT IN AN ORGANIZED HEALTH CARE EDUCATION/TRAINING PROGRAM

## 2023-03-13 PROCEDURE — 6360000002 HC RX W HCPCS: Performed by: STUDENT IN AN ORGANIZED HEALTH CARE EDUCATION/TRAINING PROGRAM

## 2023-03-13 PROCEDURE — 2500000003 HC RX 250 WO HCPCS: Performed by: EMERGENCY MEDICINE

## 2023-03-13 PROCEDURE — 6360000002 HC RX W HCPCS: Performed by: NURSE ANESTHETIST, CERTIFIED REGISTERED

## 2023-03-13 PROCEDURE — 2500000003 HC RX 250 WO HCPCS: Performed by: NURSE PRACTITIONER

## 2023-03-13 PROCEDURE — 96375 TX/PRO/DX INJ NEW DRUG ADDON: CPT

## 2023-03-13 PROCEDURE — 6360000004 HC RX CONTRAST MEDICATION: Performed by: NURSE PRACTITIONER

## 2023-03-13 PROCEDURE — G0378 HOSPITAL OBSERVATION PER HR: HCPCS

## 2023-03-13 PROCEDURE — 2580000003 HC RX 258: Performed by: STUDENT IN AN ORGANIZED HEALTH CARE EDUCATION/TRAINING PROGRAM

## 2023-03-13 PROCEDURE — 74177 CT ABD & PELVIS W/CONTRAST: CPT

## 2023-03-13 PROCEDURE — 7100000001 HC PACU RECOVERY - ADDTL 15 MIN: Performed by: STUDENT IN AN ORGANIZED HEALTH CARE EDUCATION/TRAINING PROGRAM

## 2023-03-13 PROCEDURE — 2709999900 HC NON-CHARGEABLE SUPPLY: Performed by: STUDENT IN AN ORGANIZED HEALTH CARE EDUCATION/TRAINING PROGRAM

## 2023-03-13 PROCEDURE — 36415 COLL VENOUS BLD VENIPUNCTURE: CPT

## 2023-03-13 PROCEDURE — 2580000003 HC RX 258: Performed by: NURSE PRACTITIONER

## 2023-03-13 PROCEDURE — 99232 SBSQ HOSP IP/OBS MODERATE 35: CPT | Performed by: STUDENT IN AN ORGANIZED HEALTH CARE EDUCATION/TRAINING PROGRAM

## 2023-03-13 PROCEDURE — 49593 RPR AA HRN 1ST 3-10 RDC: CPT | Performed by: STUDENT IN AN ORGANIZED HEALTH CARE EDUCATION/TRAINING PROGRAM

## 2023-03-13 PROCEDURE — 2580000003 HC RX 258: Performed by: NURSE ANESTHETIST, CERTIFIED REGISTERED

## 2023-03-13 PROCEDURE — 3600000009 HC SURGERY ROBOT BASE: Performed by: STUDENT IN AN ORGANIZED HEALTH CARE EDUCATION/TRAINING PROGRAM

## 2023-03-13 PROCEDURE — 3700000000 HC ANESTHESIA ATTENDED CARE: Performed by: STUDENT IN AN ORGANIZED HEALTH CARE EDUCATION/TRAINING PROGRAM

## 2023-03-13 PROCEDURE — S2900 ROBOTIC SURGICAL SYSTEM: HCPCS | Performed by: STUDENT IN AN ORGANIZED HEALTH CARE EDUCATION/TRAINING PROGRAM

## 2023-03-13 PROCEDURE — 49594 RPR AA HRN 1ST 3-10 NCR/STRN: CPT | Performed by: STUDENT IN AN ORGANIZED HEALTH CARE EDUCATION/TRAINING PROGRAM

## 2023-03-13 PROCEDURE — 6370000000 HC RX 637 (ALT 250 FOR IP): Performed by: NURSE PRACTITIONER

## 2023-03-13 PROCEDURE — C1781 MESH (IMPLANTABLE): HCPCS | Performed by: STUDENT IN AN ORGANIZED HEALTH CARE EDUCATION/TRAINING PROGRAM

## 2023-03-13 PROCEDURE — 2500000003 HC RX 250 WO HCPCS: Performed by: STUDENT IN AN ORGANIZED HEALTH CARE EDUCATION/TRAINING PROGRAM

## 2023-03-13 PROCEDURE — A4216 STERILE WATER/SALINE, 10 ML: HCPCS | Performed by: NURSE PRACTITIONER

## 2023-03-13 PROCEDURE — 80053 COMPREHEN METABOLIC PANEL: CPT

## 2023-03-13 PROCEDURE — 3600000019 HC SURGERY ROBOT ADDTL 15MIN: Performed by: STUDENT IN AN ORGANIZED HEALTH CARE EDUCATION/TRAINING PROGRAM

## 2023-03-13 DEVICE — VENTRALIGHT ST MESH, 4.5" (11.4 CM), CIRCLE
Type: IMPLANTABLE DEVICE | Site: ABDOMEN | Status: FUNCTIONAL
Brand: VENTRALIGHT

## 2023-03-13 RX ORDER — SODIUM CHLORIDE, SODIUM LACTATE, POTASSIUM CHLORIDE, CALCIUM CHLORIDE 600; 310; 30; 20 MG/100ML; MG/100ML; MG/100ML; MG/100ML
INJECTION, SOLUTION INTRAVENOUS CONTINUOUS PRN
Status: DISCONTINUED | OUTPATIENT
Start: 2023-03-13 | End: 2023-03-13 | Stop reason: SDUPTHER

## 2023-03-13 RX ORDER — IPRATROPIUM BROMIDE AND ALBUTEROL SULFATE 2.5; .5 MG/3ML; MG/3ML
1 SOLUTION RESPIRATORY (INHALATION)
Status: DISCONTINUED | OUTPATIENT
Start: 2023-03-13 | End: 2023-03-13 | Stop reason: HOSPADM

## 2023-03-13 RX ORDER — OXYCODONE HYDROCHLORIDE 5 MG/1
5 TABLET ORAL PRN
Status: DISCONTINUED | OUTPATIENT
Start: 2023-03-13 | End: 2023-03-13 | Stop reason: HOSPADM

## 2023-03-13 RX ORDER — PROPOFOL 10 MG/ML
INJECTION, EMULSION INTRAVENOUS PRN
Status: DISCONTINUED | OUTPATIENT
Start: 2023-03-13 | End: 2023-03-13 | Stop reason: SDUPTHER

## 2023-03-13 RX ORDER — PROCHLORPERAZINE EDISYLATE 5 MG/ML
5 INJECTION INTRAMUSCULAR; INTRAVENOUS
Status: DISCONTINUED | OUTPATIENT
Start: 2023-03-13 | End: 2023-03-13 | Stop reason: HOSPADM

## 2023-03-13 RX ORDER — SODIUM CHLORIDE 9 MG/ML
INJECTION, SOLUTION INTRAVENOUS CONTINUOUS
Status: DISCONTINUED | OUTPATIENT
Start: 2023-03-13 | End: 2023-03-14 | Stop reason: HOSPADM

## 2023-03-13 RX ORDER — SODIUM CHLORIDE 0.9 % (FLUSH) 0.9 %
5-40 SYRINGE (ML) INJECTION EVERY 12 HOURS SCHEDULED
Status: DISCONTINUED | OUTPATIENT
Start: 2023-03-13 | End: 2023-03-14 | Stop reason: HOSPADM

## 2023-03-13 RX ORDER — LOSARTAN POTASSIUM 50 MG/1
100 TABLET ORAL DAILY
Status: DISCONTINUED | OUTPATIENT
Start: 2023-03-13 | End: 2023-03-14 | Stop reason: HOSPADM

## 2023-03-13 RX ORDER — HYDRALAZINE HYDROCHLORIDE 50 MG/1
50 TABLET, FILM COATED ORAL EVERY 8 HOURS SCHEDULED
Status: DISCONTINUED | OUTPATIENT
Start: 2023-03-13 | End: 2023-03-14 | Stop reason: HOSPADM

## 2023-03-13 RX ORDER — ROCURONIUM BROMIDE 10 MG/ML
INJECTION, SOLUTION INTRAVENOUS PRN
Status: DISCONTINUED | OUTPATIENT
Start: 2023-03-13 | End: 2023-03-13 | Stop reason: SDUPTHER

## 2023-03-13 RX ORDER — HYDRALAZINE HYDROCHLORIDE 20 MG/ML
10 INJECTION INTRAMUSCULAR; INTRAVENOUS ONCE
Status: COMPLETED | OUTPATIENT
Start: 2023-03-13 | End: 2023-03-13

## 2023-03-13 RX ORDER — ONDANSETRON 2 MG/ML
4 INJECTION INTRAMUSCULAR; INTRAVENOUS EVERY 6 HOURS PRN
Status: DISCONTINUED | OUTPATIENT
Start: 2023-03-13 | End: 2023-03-14 | Stop reason: HOSPADM

## 2023-03-13 RX ORDER — FENTANYL CITRATE 50 UG/ML
INJECTION, SOLUTION INTRAMUSCULAR; INTRAVENOUS PRN
Status: DISCONTINUED | OUTPATIENT
Start: 2023-03-13 | End: 2023-03-13 | Stop reason: SDUPTHER

## 2023-03-13 RX ORDER — DIPHENHYDRAMINE HYDROCHLORIDE 50 MG/ML
12.5 INJECTION INTRAMUSCULAR; INTRAVENOUS
Status: DISCONTINUED | OUTPATIENT
Start: 2023-03-13 | End: 2023-03-13 | Stop reason: HOSPADM

## 2023-03-13 RX ORDER — DEXAMETHASONE SODIUM PHOSPHATE 4 MG/ML
INJECTION, SOLUTION INTRA-ARTICULAR; INTRALESIONAL; INTRAMUSCULAR; INTRAVENOUS; SOFT TISSUE PRN
Status: DISCONTINUED | OUTPATIENT
Start: 2023-03-13 | End: 2023-03-13 | Stop reason: SDUPTHER

## 2023-03-13 RX ORDER — LIDOCAINE HYDROCHLORIDE AND EPINEPHRINE 10; 10 MG/ML; UG/ML
INJECTION, SOLUTION INFILTRATION; PERINEURAL PRN
Status: DISCONTINUED | OUTPATIENT
Start: 2023-03-13 | End: 2023-03-13 | Stop reason: HOSPADM

## 2023-03-13 RX ORDER — ACETAMINOPHEN 325 MG/1
650 TABLET ORAL EVERY 6 HOURS PRN
Status: DISCONTINUED | OUTPATIENT
Start: 2023-03-13 | End: 2023-03-14 | Stop reason: HOSPADM

## 2023-03-13 RX ORDER — LABETALOL HYDROCHLORIDE 5 MG/ML
10 INJECTION, SOLUTION INTRAVENOUS
Status: DISCONTINUED | OUTPATIENT
Start: 2023-03-13 | End: 2023-03-13 | Stop reason: HOSPADM

## 2023-03-13 RX ORDER — METHOCARBAMOL 750 MG/1
750 TABLET, FILM COATED ORAL 3 TIMES DAILY
Qty: 30 TABLET | Refills: 0 | Status: SHIPPED | OUTPATIENT
Start: 2023-03-13 | End: 2023-03-14

## 2023-03-13 RX ORDER — BUPIVACAINE HYDROCHLORIDE 2.5 MG/ML
INJECTION, SOLUTION EPIDURAL; INFILTRATION; INTRACAUDAL PRN
Status: DISCONTINUED | OUTPATIENT
Start: 2023-03-13 | End: 2023-03-13 | Stop reason: HOSPADM

## 2023-03-13 RX ORDER — DOCUSATE SODIUM 100 MG/1
100 CAPSULE, LIQUID FILLED ORAL 2 TIMES DAILY
Qty: 60 CAPSULE | Refills: 0 | Status: SHIPPED | OUTPATIENT
Start: 2023-03-13 | End: 2023-03-14

## 2023-03-13 RX ORDER — HYDRALAZINE HYDROCHLORIDE 20 MG/ML
10 INJECTION INTRAMUSCULAR; INTRAVENOUS EVERY 6 HOURS PRN
Status: DISCONTINUED | OUTPATIENT
Start: 2023-03-13 | End: 2023-03-14 | Stop reason: HOSPADM

## 2023-03-13 RX ORDER — SPIRONOLACTONE 25 MG/1
50 TABLET ORAL DAILY
Status: DISCONTINUED | OUTPATIENT
Start: 2023-03-13 | End: 2023-03-14 | Stop reason: HOSPADM

## 2023-03-13 RX ORDER — ACETAMINOPHEN 650 MG/1
650 SUPPOSITORY RECTAL EVERY 6 HOURS PRN
Status: DISCONTINUED | OUTPATIENT
Start: 2023-03-13 | End: 2023-03-14 | Stop reason: HOSPADM

## 2023-03-13 RX ORDER — SODIUM CHLORIDE 0.9 % (FLUSH) 0.9 %
5-40 SYRINGE (ML) INJECTION PRN
Status: DISCONTINUED | OUTPATIENT
Start: 2023-03-13 | End: 2023-03-14 | Stop reason: HOSPADM

## 2023-03-13 RX ORDER — HYDROMORPHONE HYDROCHLORIDE 2 MG/ML
0.5 INJECTION, SOLUTION INTRAMUSCULAR; INTRAVENOUS; SUBCUTANEOUS EVERY 5 MIN PRN
Status: COMPLETED | OUTPATIENT
Start: 2023-03-13 | End: 2023-03-13

## 2023-03-13 RX ORDER — LABETALOL HYDROCHLORIDE 5 MG/ML
INJECTION, SOLUTION INTRAVENOUS PRN
Status: DISCONTINUED | OUTPATIENT
Start: 2023-03-13 | End: 2023-03-13 | Stop reason: SDUPTHER

## 2023-03-13 RX ORDER — OXYCODONE AND ACETAMINOPHEN 7.5; 325 MG/1; MG/1
1 TABLET ORAL EVERY 4 HOURS PRN
Status: DISCONTINUED | OUTPATIENT
Start: 2023-03-13 | End: 2023-03-14 | Stop reason: HOSPADM

## 2023-03-13 RX ORDER — LEVETIRACETAM 500 MG/1
750 TABLET ORAL 2 TIMES DAILY
Status: DISCONTINUED | OUTPATIENT
Start: 2023-03-13 | End: 2023-03-14 | Stop reason: HOSPADM

## 2023-03-13 RX ORDER — SODIUM CHLORIDE, SODIUM LACTATE, POTASSIUM CHLORIDE, CALCIUM CHLORIDE 600; 310; 30; 20 MG/100ML; MG/100ML; MG/100ML; MG/100ML
INJECTION, SOLUTION INTRAVENOUS CONTINUOUS
Status: DISCONTINUED | OUTPATIENT
Start: 2023-03-13 | End: 2023-03-14 | Stop reason: HOSPADM

## 2023-03-13 RX ORDER — OXYCODONE HYDROCHLORIDE 5 MG/1
10 TABLET ORAL PRN
Status: DISCONTINUED | OUTPATIENT
Start: 2023-03-13 | End: 2023-03-13 | Stop reason: HOSPADM

## 2023-03-13 RX ORDER — LIDOCAINE HYDROCHLORIDE 20 MG/ML
INJECTION, SOLUTION EPIDURAL; INFILTRATION; INTRACAUDAL; PERINEURAL PRN
Status: DISCONTINUED | OUTPATIENT
Start: 2023-03-13 | End: 2023-03-13 | Stop reason: SDUPTHER

## 2023-03-13 RX ORDER — ONDANSETRON 4 MG/1
4 TABLET, ORALLY DISINTEGRATING ORAL EVERY 8 HOURS PRN
Status: DISCONTINUED | OUTPATIENT
Start: 2023-03-13 | End: 2023-03-14 | Stop reason: HOSPADM

## 2023-03-13 RX ORDER — HALOPERIDOL 5 MG/ML
1 INJECTION INTRAMUSCULAR
Status: DISCONTINUED | OUTPATIENT
Start: 2023-03-13 | End: 2023-03-13 | Stop reason: HOSPADM

## 2023-03-13 RX ORDER — AMLODIPINE BESYLATE 10 MG/1
10 TABLET ORAL DAILY
Status: DISCONTINUED | OUTPATIENT
Start: 2023-03-13 | End: 2023-03-14 | Stop reason: HOSPADM

## 2023-03-13 RX ORDER — HYDRALAZINE HYDROCHLORIDE 20 MG/ML
10 INJECTION INTRAMUSCULAR; INTRAVENOUS
Status: DISCONTINUED | OUTPATIENT
Start: 2023-03-13 | End: 2023-03-13 | Stop reason: HOSPADM

## 2023-03-13 RX ORDER — OXYCODONE HYDROCHLORIDE AND ACETAMINOPHEN 5; 325 MG/1; MG/1
1 TABLET ORAL EVERY 4 HOURS PRN
Qty: 15 TABLET | Refills: 0 | Status: SHIPPED | OUTPATIENT
Start: 2023-03-13 | End: 2023-03-14

## 2023-03-13 RX ORDER — SODIUM CHLORIDE 9 MG/ML
INJECTION, SOLUTION INTRAVENOUS PRN
Status: DISCONTINUED | OUTPATIENT
Start: 2023-03-13 | End: 2023-03-14 | Stop reason: HOSPADM

## 2023-03-13 RX ADMIN — SODIUM CHLORIDE: 9 INJECTION, SOLUTION INTRAVENOUS at 02:18

## 2023-03-13 RX ADMIN — IOHEXOL 100 ML: 350 INJECTION, SOLUTION INTRAVENOUS at 09:31

## 2023-03-13 RX ADMIN — Medication 2000 MG: at 12:07

## 2023-03-13 RX ADMIN — SUGAMMADEX 200 MG: 100 INJECTION, SOLUTION INTRAVENOUS at 13:29

## 2023-03-13 RX ADMIN — HYDRALAZINE HYDROCHLORIDE 10 MG: 20 INJECTION INTRAMUSCULAR; INTRAVENOUS at 02:45

## 2023-03-13 RX ADMIN — LEVETIRACETAM 750 MG: 500 TABLET, FILM COATED ORAL at 21:03

## 2023-03-13 RX ADMIN — HYDROMORPHONE HYDROCHLORIDE 0.5 MG: 2 INJECTION, SOLUTION INTRAMUSCULAR; INTRAVENOUS; SUBCUTANEOUS at 14:05

## 2023-03-13 RX ADMIN — ROCURONIUM BROMIDE 40 MG: 50 INJECTION, SOLUTION INTRAVENOUS at 12:05

## 2023-03-13 RX ADMIN — FAMOTIDINE 20 MG: 10 INJECTION INTRAVENOUS at 02:19

## 2023-03-13 RX ADMIN — FAMOTIDINE 20 MG: 10 INJECTION INTRAVENOUS at 21:04

## 2023-03-13 RX ADMIN — FENTANYL CITRATE 100 MCG: 50 INJECTION, SOLUTION INTRAMUSCULAR; INTRAVENOUS at 12:05

## 2023-03-13 RX ADMIN — LIDOCAINE HYDROCHLORIDE 100 MG: 20 INJECTION, SOLUTION EPIDURAL; INFILTRATION; INTRACAUDAL; PERINEURAL at 12:05

## 2023-03-13 RX ADMIN — HYDRALAZINE HYDROCHLORIDE 10 MG: 20 INJECTION INTRAMUSCULAR; INTRAVENOUS at 15:32

## 2023-03-13 RX ADMIN — HYDRALAZINE HYDROCHLORIDE 50 MG: 50 TABLET, FILM COATED ORAL at 21:04

## 2023-03-13 RX ADMIN — OXYCODONE AND ACETAMINOPHEN 1 TABLET: 7.5; 325 TABLET ORAL at 16:28

## 2023-03-13 RX ADMIN — PROPOFOL 170 MG: 10 INJECTION, EMULSION INTRAVENOUS at 12:05

## 2023-03-13 RX ADMIN — SPIRONOLACTONE 50 MG: 25 TABLET ORAL at 19:24

## 2023-03-13 RX ADMIN — DEXAMETHASONE SODIUM PHOSPHATE 4 MG: 4 INJECTION, SOLUTION INTRAMUSCULAR; INTRAVENOUS at 12:36

## 2023-03-13 RX ADMIN — OXYCODONE AND ACETAMINOPHEN 1 TABLET: 7.5; 325 TABLET ORAL at 21:03

## 2023-03-13 RX ADMIN — HYDROMORPHONE HYDROCHLORIDE 0.5 MG: 2 INJECTION, SOLUTION INTRAMUSCULAR; INTRAVENOUS; SUBCUTANEOUS at 14:00

## 2023-03-13 RX ADMIN — SODIUM CHLORIDE, SODIUM LACTATE, POTASSIUM CHLORIDE, AND CALCIUM CHLORIDE: 600; 310; 30; 20 INJECTION, SOLUTION INTRAVENOUS at 11:52

## 2023-03-13 RX ADMIN — LOSARTAN POTASSIUM 100 MG: 50 TABLET, FILM COATED ORAL at 19:24

## 2023-03-13 RX ADMIN — LABETALOL HYDROCHLORIDE 20 MG: 5 INJECTION, SOLUTION INTRAVENOUS at 00:35

## 2023-03-13 RX ADMIN — LABETALOL HYDROCHLORIDE 5 MG: 5 INJECTION INTRAVENOUS at 12:24

## 2023-03-13 RX ADMIN — HYDROMORPHONE HYDROCHLORIDE 0.5 MG: 2 INJECTION, SOLUTION INTRAMUSCULAR; INTRAVENOUS; SUBCUTANEOUS at 14:10

## 2023-03-13 RX ADMIN — LABETALOL HYDROCHLORIDE 5 MG: 5 INJECTION INTRAVENOUS at 13:30

## 2023-03-13 RX ADMIN — HYDROMORPHONE HYDROCHLORIDE 0.5 MG: 2 INJECTION, SOLUTION INTRAMUSCULAR; INTRAVENOUS; SUBCUTANEOUS at 14:15

## 2023-03-13 RX ADMIN — SODIUM CHLORIDE, PRESERVATIVE FREE 10 ML: 5 INJECTION INTRAVENOUS at 09:25

## 2023-03-13 RX ADMIN — SODIUM CHLORIDE, PRESERVATIVE FREE 10 ML: 5 INJECTION INTRAVENOUS at 21:09

## 2023-03-13 RX ADMIN — AMLODIPINE BESYLATE 10 MG: 10 TABLET ORAL at 19:24

## 2023-03-13 RX ADMIN — DIATRIZOATE MEGLUMINE AND DIATRIZOATE SODIUM 15 ML: 660; 100 LIQUID ORAL; RECTAL at 06:33

## 2023-03-13 RX ADMIN — SODIUM CHLORIDE, POTASSIUM CHLORIDE, SODIUM LACTATE AND CALCIUM CHLORIDE: 600; 310; 30; 20 INJECTION, SOLUTION INTRAVENOUS at 11:32

## 2023-03-13 RX ADMIN — HYDRALAZINE HYDROCHLORIDE 10 MG: 20 INJECTION INTRAMUSCULAR; INTRAVENOUS at 14:11

## 2023-03-13 ASSESSMENT — ENCOUNTER SYMPTOMS
RESPIRATORY NEGATIVE: 1
ABDOMINAL PAIN: 1
BACK PAIN: 0

## 2023-03-13 ASSESSMENT — PAIN DESCRIPTION - PAIN TYPE
TYPE: SURGICAL PAIN
TYPE: SURGICAL PAIN

## 2023-03-13 ASSESSMENT — PAIN SCALES - GENERAL
PAINLEVEL_OUTOF10: 9
PAINLEVEL_OUTOF10: 8
PAINLEVEL_OUTOF10: 0
PAINLEVEL_OUTOF10: 6
PAINLEVEL_OUTOF10: 5
PAINLEVEL_OUTOF10: 8
PAINLEVEL_OUTOF10: 4
PAINLEVEL_OUTOF10: 3
PAINLEVEL_OUTOF10: 6
PAINLEVEL_OUTOF10: 7

## 2023-03-13 ASSESSMENT — PAIN DESCRIPTION - LOCATION
LOCATION: INCISION
LOCATION: ABDOMEN

## 2023-03-13 ASSESSMENT — PAIN DESCRIPTION - DESCRIPTORS
DESCRIPTORS: SHARP
DESCRIPTORS: CRAMPING;ACHING

## 2023-03-13 ASSESSMENT — PAIN - FUNCTIONAL ASSESSMENT: PAIN_FUNCTIONAL_ASSESSMENT: 0-10

## 2023-03-13 ASSESSMENT — PAIN DESCRIPTION - FREQUENCY: FREQUENCY: INTERMITTENT

## 2023-03-13 ASSESSMENT — PAIN DESCRIPTION - ORIENTATION
ORIENTATION: ANTERIOR;LEFT
ORIENTATION: ANTERIOR

## 2023-03-13 ASSESSMENT — LIFESTYLE VARIABLES: SMOKING_STATUS: 1

## 2023-03-13 NOTE — ED PROVIDER NOTES
Emergency Department Provider Note                   PCP:                Charline David. NYA De Guzman NP               Age: 58 y.o. Sex: female     DISPOSITION Decision To Admit 03/12/2023 11:35:18 PM       ICD-10-CM    1. Abdominal wall hernia  K43.9           MEDICAL DECISION MAKING  Complexity of Problems Addressed:  1 or more acute illnesses that pose a threat to life or bodily function. Data Reviewed and Analyzed:  Category 1:     I ordered each unique test.  I reviewed the results of each unique test.        Category 2:   I independently ordered and reviewed the EKG. I independently ordered and reviewed the CT Scan. CT scan performed prior to reduction of hernia with an associated hernia noted in the anterior abdomen  I independently ordered and reviewed the labs    EKG obtained and independently interpreted by me. Sinus rhythm rate of 83. Normal axis. Prolonged QT interval.  No STEMI criteria noted. Category 3: Discussion of management or test interpretation. On initial evaluation suspicious for anterior abdominal hernia. 4 of IV morphine and IV Zofran given. An ice pack was placed on the hernia. Patient was placed in reverse Trendelenburg. Unsuccessful reduction of hernia. Patient went to CT. After she came back a second attempt was performed and appeared successful at this time. After approximately 45 minutes post reduction, patient still having 5 out of 10 abdominal pain. On evaluation no obvious hernia palpated at this time however it is tender to palpation. Concern for possible partial herniation versus possible partial obstruction. I consulted on-call surgical team.  Dr. Marcelina Olivarez from the surgical team came and evaluate the patient. We will admit the patient for observation and repeat CT in the morning for further assessment. Of note her blood pressure still elevated.   She has a history of hypertensive emergency and urgency in the past.  We will give a dose of IV Dilaudid at this time. If blood pressure does not improve will give labetalol 20 mg IV for blood pressure control. Risk of Complications and/or Morbidity of Patient Management:  Patient was admitted I have communication with admitting physician     Gudelia Zachary is a 58 y.o. female who presents to the Emergency Department with chief complaint of    Chief Complaint   Patient presents with    Abdominal Pain      80 female history of hypertension, hepatitis is here with abdominal pain. Acute onset early this morning. There was a knot on her anterior abdomen that has never been there before. It is very tender to the touch. She denies any nausea vomiting, diarrhea. However has not had a bowel movement since early this morning. She denies any fever. Denies any prior abdominal surgery. She took her blood pressure medication this morning. Review of Systems   Constitutional: Negative. Negative for fever. HENT: Negative. Respiratory: Negative. Cardiovascular: Negative. Gastrointestinal:  Positive for abdominal pain. Genitourinary: Negative. Musculoskeletal:  Negative for back pain. Skin: Negative. Neurological: Negative. Vitals signs and nursing note reviewed. Patient Vitals for the past 4 hrs:   Temp Pulse Resp BP SpO2   03/13/23 0000 -- 76 18 (!) 206/118 96 %   03/12/23 2346 -- 78 17 (!) 221/117 97 %   03/12/23 2330 -- 90 17 (!) 233/109 97 %   03/12/23 2256 -- 86 14 (!) 211/110 98 %   03/12/23 2245 -- 86 20 (!) 208/111 97 %   03/12/23 2226 -- 88 12 (!) 197/107 99 %   03/12/23 2146 -- 80 21 (!) 192/100 96 %   03/12/23 2115 -- 76 22 (!) 202/98 98 %   03/12/23 2100 -- 83 15 (!) 190/98 99 %   03/12/23 2056 -- 82 21 (!) 190/94 98 %   03/12/23 2048 97.8 °F (36.6 °C) 88 19 (!) 189/95 98 %          Physical Exam  Constitutional:       Appearance: Normal appearance. HENT:      Head: Normocephalic and atraumatic.       Nose: Nose normal.      Mouth/Throat:      Mouth: Mucous membranes are moist.   Cardiovascular:      Rate and Rhythm: Normal rate and regular rhythm. Pulses: Normal pulses. Heart sounds: Normal heart sounds. Pulmonary:      Effort: Pulmonary effort is normal.      Breath sounds: Normal breath sounds. Abdominal:      Comments: Abdominal exam under midline anterior abdomen just above the umbilicus with a very hard palpable knot that is suspicious for an anterior abdominal hernia. Positive bowel sounds. No bruit noted. Tender to palpation directly over the suspected hernia. No obvious abdominal pain guarding or rebound elsewhere in the abdomen   Musculoskeletal:         General: Normal range of motion. Cervical back: Normal range of motion. Skin:     General: Skin is warm. Neurological:      General: No focal deficit present. Mental Status: She is alert and oriented to person, place, and time. Mental status is at baseline.         Procedures     Orders Placed This Encounter   Procedures    CT ABDOMEN PELVIS W IV CONTRAST Additional Contrast? None    CBC with Diff    CMP    Lipase    Urinalysis w rflx microscopic    Diet NPO    POCT Urine Dipstick    POCT Urinalysis no Micro    EKG 12 Lead (Select if Upper Abd Pain, or SOB, Diaphoresis or Tachy)    Saline lock IV        Medications   labetalol (NORMODYNE;TRANDATE) injection 20 mg (has no administration in time range)   morphine injection 4 mg (4 mg IntraVENous Given 3/12/23 2141)   ondansetron (ZOFRAN) injection 4 mg (4 mg IntraVENous Given 3/12/23 2141)   0.9 % sodium chloride bolus (0 mLs IntraVENous Stopped 3/13/23 0013)   0.9 % sodium chloride bolus (0 mLs IntraVENous Stopped 3/12/23 2220)   sodium chloride flush 0.9 % injection 10 mL (10 mLs IntraVENous Given 3/12/23 2207)   iohexol (OMNIPAQUE 350) solution 100 mL (100 mLs IntraVENous Given 3/12/23 2207)   HYDROmorphone HCl PF (DILAUDID) injection 1 mg (1 mg IntraVENous Given 3/12/23 2350)       New Prescriptions    No medications on file        Past Medical History:   Diagnosis Date    Hypertension     Infectious disease     hep c        Past Surgical History:   Procedure Laterality Date    ORTHOPEDIC SURGERY      evie in right leg    UPPER GASTROINTESTINAL ENDOSCOPY N/A 10/28/2022    EGD BIOPSY performed by Osvaldo Montero MD at [facility] ENDOSCOPY        Family History   Problem Relation Age of Onset    Lung Disease Mother         Social History     Socioeconomic History    Marital status: Single   Tobacco Use    Smoking status: Every Day     Packs/day: 2.00     Types: Cigarettes    Smokeless tobacco: Current   Substance and Sexual Activity    Alcohol use: Yes    Drug use: No        Allergies: Lisinopril    Previous Medications    AMLODIPINE (NORVASC) 10 MG TABLET    Take 1 tablet by mouth daily    CHOLESTYRAMINE LIGHT (PREVALITE) 4 GM/DOSE POWDER    Take by mouth 2 times daily (with meals)    ERGOCALCIFEROL (ERGOCALCIFEROL) 1.25 MG (59598 UT) CAPSULE    Take 50,000 Units by mouth    FLUTICASONE-UMECLIDIN-VILANT (TRELEGY ELLIPTA) 100-62.5-25 MCG/INH AEPB    Inhale 1 puff into the lungs daily    HYDRALAZINE (APRESOLINE) 50 MG TABLET    Take 1 tablet by mouth every 8 hours    HYDROXYZINE (ATARAX) 25 MG TABLET    Take 25 mg by mouth every 4 hours as needed    LACTULOSE (CONSTULOSE) 10 GM/15ML SOLUTION    Take 10 g by mouth daily. LEVETIRACETAM (KEPPRA) 750 MG TABLET    Take 1 tablet by mouth 2 times daily    LOSARTAN (COZAAR) 100 MG TABLET    Take 1 tablet by mouth daily    MAGNESIUM OXIDE (MAG-OX) 400 MG TABLET    Take 400 mg by mouth 2 times daily    NICOTINE POLACRILEX (NICORETTE) 2 MG GUM    Take 2 mg by mouth as needed for Smoking cessation.  Take 1 piece every 2 hours as needed    PANTOPRAZOLE (PROTONIX) 40 MG TABLET    Take 1 tablet by mouth 2 times daily (before meals)    POTASSIUM CHLORIDE (KLOR-CON M) 20 MEQ EXTENDED RELEASE TABLET    Take 20 mEq by mouth daily    SPIRONOLACTONE (ALDACTONE) 50 MG TABLET    Take 50 mg by mouth daily        Results for orders placed or performed during the hospital encounter of 03/12/23   CT ABDOMEN PELVIS W IV CONTRAST Additional Contrast? None    Narrative    EXAMINATION: CT SCAN OF THE ABDOMEN AND PELVIS WITH INTRAVENOUS CONTRAST    DATE OF EXAM: 3/12/2023 9:45 PM    HISTORY: ant abdomen hernia with pain    COMPARISON: None. TECHNIQUE: CT examination of the abdomen and pelvis was performed following the   intravenous administration of 100 mL of Omnipaque 350. CT dose lowering   techniques were used, to include: automated exposure control, adjustment for   patient size, and/or use of iterative reconstruction. FINDINGS:    ABDOMEN/PELVIS:    Lower Chest: Normal.     Liver: Severely cirrhotic liver with an irregular, nodular contour. No focal   mass is identified but the liver is so heterogeneous that this really cannot be   excluded. Further evaluation could be performed with correlation with AFP and   possible follow-up MRI. Gallbladder/Biliary: Gallstones but no acute cholecystitis    Pancreas: Normal.     Spleen: Normal.     Adrenal Glands: Normal.     Kidneys: Normal.     GI Tract: There is a small anterior abdominal wall hernia located between the   rectus abdominis muscles above the umbilicus with a short segment of obstructed   small bowel measuring about 3 cm across. The bowel is both obstructed within the   hernia sac and proximal to it. Bowel wall enhances normally. No perforation,   abscess or obstruction. Mesentery/Peritoneum: Normal.    Vasculature: Normal.     Lymph Nodes: Normal.     Abdominal Wall: Normal.     Bladder: Normal.     Reproductive: Normal.     Musculoskeletal: Normal.        Impression    1. Midline anterior abdominal wall hernia between the rectus abdominis muscles   containing a short segment of small bowel that is both obstructed within the   hernia sac and resulting in a high-grade small bowel obstruction proximal to it. 2.  Bowel wall enhances normally.  No evidence of perforation or ischemia   currently. 3.  Severely cirrhotic liver.   4.  Gallstones but no acute cholecystitis     Angel Rogers M.D.   3/12/2023 10:38:00 PM   CBC with Diff   Result Value Ref Range    WBC 4.4 4.3 - 11.1 K/uL    RBC 3.37 (L) 4.05 - 5.2 M/uL    Hemoglobin 11.0 (L) 11.7 - 15.4 g/dL    Hematocrit 32.2 (L) 35.8 - 46.3 %    MCV 95.5 82 - 102 FL    MCH 32.6 26.1 - 32.9 PG    MCHC 34.2 31.4 - 35.0 g/dL    RDW 13.7 11.9 - 14.6 %    Platelets 91 (L) 832 - 450 K/uL    MPV 11.7 9.4 - 12.3 FL    nRBC 0.00 0.0 - 0.2 K/uL    Differential Type AUTOMATED      Seg Neutrophils 74 43 - 78 %    Lymphocytes 15 13 - 44 %    Monocytes 10 4.0 - 12.0 %    Eosinophils % 1 0.5 - 7.8 %    Basophils 1 0.0 - 2.0 %    Immature Granulocytes 0 0.0 - 5.0 %    Segs Absolute 3.3 1.7 - 8.2 K/UL    Absolute Lymph # 0.6 0.5 - 4.6 K/UL    Absolute Mono # 0.4 0.1 - 1.3 K/UL    Absolute Eos # 0.0 0.0 - 0.8 K/UL    Basophils Absolute 0.0 0.0 - 0.2 K/UL    Absolute Immature Granulocyte 0.0 0.0 - 0.5 K/UL   CMP   Result Value Ref Range    Sodium 132 (L) 133 - 143 mmol/L    Potassium 3.5 3.5 - 5.1 mmol/L    Chloride 104 101 - 110 mmol/L    CO2 23 21 - 32 mmol/L    Anion Gap 5 2 - 11 mmol/L    Glucose 128 (H) 65 - 100 mg/dL    BUN 28 (H) 8 - 23 MG/DL    Creatinine 1.50 (H) 0.6 - 1.0 MG/DL    Est, Glom Filt Rate 39 (L) >60 ml/min/1.73m2    Calcium 9.2 8.3 - 10.4 MG/DL    Total Bilirubin 1.3 (H) 0.2 - 1.1 MG/DL    ALT 23 12 - 65 U/L    AST 45 (H) 15 - 37 U/L    Alk Phosphatase 134 50 - 136 U/L    Total Protein 8.7 (H) 6.3 - 8.2 g/dL    Albumin 3.0 (L) 3.2 - 4.6 g/dL    Globulin 5.7 (H) 2.8 - 4.5 g/dL    Albumin/Globulin Ratio 0.5 0.4 - 1.6     Lipase   Result Value Ref Range    Lipase 401 (H) 73 - 393 U/L   EKG 12 Lead (Select if Upper Abd Pain, or SOB, Diaphoresis or Tachy)   Result Value Ref Range    Ventricular Rate 83 BPM    Atrial Rate 82 BPM    P-R Interval 165 ms    QRS Duration 103 ms    Q-T Interval 453 ms    QTc Calculation (Bazett) 533 ms    P Axis 31 degrees    R Axis -4 degrees    T Axis 11 degrees    Diagnosis Sinus rhythm         CT ABDOMEN PELVIS W IV CONTRAST Additional Contrast? None   Final Result      1. Midline anterior abdominal wall hernia between the rectus abdominis muscles    containing a short segment of small bowel that is both obstructed within the    hernia sac and resulting in a high-grade small bowel obstruction proximal to it. 2.  Bowel wall enhances normally. No evidence of perforation or ischemia    currently. 3.  Severely cirrhotic liver. 4.  Gallstones but no acute cholecystitis       Rissa Han M.D.    3/12/2023 10:38:00 PM                        Voice dictation software was used during the making of this note. This software is not perfect and grammatical and other typographical errors may be present. This note has not been completely proofread for errors.      Claribel Schafer MD  03/13/23 0636

## 2023-03-13 NOTE — PROGRESS NOTES
0TRANSFER - IN REPORT:    Verbal report received from [facility] on Quinn Acuna  being received from er for routine progression of patient care      Report consisted of patient's Situation, Background, Assessment and   Recommendations(SBAR). Information from the following report(s) Nurse Handoff Report was reviewed with the receiving nurse. Opportunity for questions and clarification was provided. Assessment completed upon patient's arrival to unit and care assumed. Arrived awake, alert, oriented x4, able to answer questions but seems dazed and not fully comprehending  the situation. Does not know names of meds/dosages or when she last took a lot of them. Does not know name or location of her pharmacy. 0230-Bp elevated; messaged K Frommel NP with prn orders for percocet and hydralazine obtained. Will give when available. Has mid abd hernia able to be seen, no nausea. Skin intact except for scratch on right arm. Family arrived and I asked them to try to bring her meds form home to hospital for PTA med list verification. 0600-BP down after hydralazine.

## 2023-03-13 NOTE — ED NOTES
POCT urine results:    GLU- Neg  KALEY- Neg  KET- Neg  SG- 1.015  ANTONIO- Small  pH- 6.5  PRO- 30 mg/dL  URO- 1.2 E.U./dL  NIT- Neg  INGRID- Laila Frederick RN  03/12/23 2291

## 2023-03-13 NOTE — PERIOP NOTE
TRANSFER - OUT REPORT:    Verbal report given to LOUISE Barker on Kerri Daniels  being transferred to [facility] for routine progression of patient care       Report consisted of patient's Situation, Background, Assessment and   Recommendations(SBAR).     Information from the following report(s) Nurse Handoff Report, Surgery Report, MAR, and Cardiac Rhythm NSR  was reviewed with the receiving nurse.  [place] Assessment: No data recorded  Lines:   Peripheral IV 03/12/23 Right Antecubital (Active)   Site Assessment Dry;Intact;Bleeding 03/13/23 1109   Line Status Infusing 03/13/23 0305   Line Care Connections checked and tightened;Cap changed 03/13/23 0305   Phlebitis Assessment No symptoms 03/13/23 0305   Infiltration Assessment 0 03/13/23 0305   Alcohol Cap Used Yes 03/13/23 0305   Dressing Status Clean, dry & intact 03/13/23 0305   Dressing Type Transparent 03/13/23 0305       Peripheral IV 03/13/23 Right;Lateral Wrist (Active)        Opportunity for questions and clarification was provided.

## 2023-03-13 NOTE — CARE COORDINATION
CM met with Ms. Jaden Gutierrez in room 220 regarding discharge planning. Demographics verified. Patient lives alone in a apartment with stairs. At baseline, patient is independent with completing ADL's and denies DME use. Patient insured and follows up with [facility] for primary care. Discharge planning: PT/OT has not been consulted. Patient with a hx of [facility] services. No CM needs expressed or identified at this time. Patient will return home when stable. CM will continue to follow and assist as needed.        03/13/23 5065   Service Assessment   Patient Orientation Alert and Oriented;Person;Place;Situation   Cognition Alert   History Provided By Patient;Medical Record   Primary Caregiver Self   Accompanied By/Relationship Friend/Associate- "[employer]   Support Systems Children;Friends/Neighbors   Patient's Healthcare Decision Maker is: Legal Next of Kin  (Children)   PCP Verified by CM Yes  (Patient attends [facility].)   Last Visit to PCP   (Patient unable to verify last visit.)   Prior Functional Level Independent in ADLs/IADLs   Current Functional Level Other (see comment)  (pending clinical course.)   Can patient return to prior living arrangement Yes   Ability to make needs known: Good   Family able to assist with home care needs: Yes   Financial Resources Other (Comment)  ([facility] Medicaid.)   Social/Functional History   Lives With Alone   Type of Home Apartment   Home Equipment None   Receives Help From Family   ADL Assistance Independent   Active  No   Mode of Transportation Family   Occupation Unemployed   Discharge Planning   Type of Residence House   Current Services Prior To Admission None   Potential Assistance Purchasing Medications No  (patient denies any challenges with obtaining medications in the community.)   Patient expects to be discharged to: [facility] Discharge   Transition of Care Consult (CM Consult) Discharge [address] [place] [address] Provided? No   Mode of Transport at Discharge Other (see comment)  (Family.)   Confirm Follow Up Transport Family   Condition of Participation: Discharge Planning   The Plan for Transition of Care is related to the following treatment goals: pending clinical course.

## 2023-03-13 NOTE — ANESTHESIA PROCEDURE NOTES
Airway  Date/Time: 3/13/2023 12:09 PM  Urgency: elective    Airway not difficult    General Information and Staff    Patient location during procedure: OR  Performed: resident/CRNA     Indications and Patient Condition  Indications for airway management: anesthesia  Spontaneous Ventilation: absent  Sedation level: deep  Preoxygenated: yes  Patient position: sniffing  MILS not maintained throughout  Mask difficulty assessment: vent by bag mask    Final Airway Details  Final airway type: endotracheal airway      Successful airway: ETT  Cuffed: yes   Successful intubation technique: direct laryngoscopy  Facilitating devices/methods: intubating stylet  Endotracheal tube insertion site: oral  Blade: [place]  Blade size: #3  ETT size (mm): 7.5  Cormack-Lehane Classification: grade I - full view of glottis  Placement verified by: chest auscultation and capnometry   Measured from: lips  ETT to lips (cm): 23  Number of attempts at approach: 1  Ventilation between attempts: bag mask  Number of other approaches attempted: 0    Additional Comments  Performed by EVA Rivero

## 2023-03-13 NOTE — OP NOTE
Robotic Ventral Herniorrhaphy with Mesh Procedure Note      Name:  Angelo Palacios Date/Time of Admission: 3/12/2023  8:46 PM  CSN: [id number]  Attending Provider: Chris Strange  MRN:  [id number]  : 1960 58 y.o. Pre-operative Diagnosis: incarcerated ventral and umbilical Hernia    Post-operative Diagnosis: Same    Procedure:  Robotic ventral 3cm and umbilical 3cm Herniorrhaphy with Mesh    Surgeon: Valentin Babcock DO    Anesthesia: General endotracheal anesthesia    INDICATION: The patient is a 58y.o.-year-old female who complains of of an abdominal wall hernia. They present today for elective laparoscopic herniorrhaphy with mesh. DESCRIPTION OF PROCEDURE: Patient was currently identified in preoperative holding area consent. Consent was confirmed and placed in the chart. Preoperative antibiotics were administered per SCIP protocol. Patient was then taken back to the operative suite placed in supine position general anesthesia was performed in the department anesthesia via an endotracheal tube next patient's abdomen was prepped and draped in usual sterile fashion. A timeout was performed and all members of the team that were present were in agreement. The procedure began by locating a spot approximately 2 fingerbreadths below the patient's left subcostal margin here this area was locally anesthetized. Next using a 15 blade scalpel and incision was made through skin down subcutaneous tissues below. A 5 mm Optiview trocar with a 5 mm 0° laparoscope was placed into this incision and the abdomen was entered under direct visualization while visualizing all layers of the abdominal wall. After entry into the abdomen, insufflation was attached. The obturator was removed and a 5 mm 30° laparoscopic camera was reinserted. Pneumoperitoneum was achieved and the contents of the abdomen were inspected and found to be free of any defects or harm.      At this point in time a thorough investigation of the abdomen was entertained. The patient was noted to have an abdominal wall hernia. Given this an additional two 8 mm trocar was then placed in the left flank after previous and anesthetization and under direct visualization, I also upsized the 5mm trocar for 8mm robotic trocar. The robot was docked. I then began by taking down the peritoneum and reducing the hernia and sac. The patient had a 3cm ventral hernia superior and then a 3cm umbilical hernia They were 6cm apart. I then utilized 0 stratifix to close both of the defects. I used two 4.5in round mesh ventral light ST mesh was chosen. The mesh was then brought into direct adjacent contact to that of the anterior abdominal wall fascia and ultimately covered the defect. Next in a circumferential running 2-0 v-lock was used to suture the mesh into place. This yielded an adequate appearing hernia repair. I then closed the peritoneum with 2-0 v-lock. Following this hemostasis was assessed and noted to be adequate. Therefore pneumoperitoneum was desufflated and all trochars removed under direct visualization. 3-0 Monocryl suture was used to close the skin of all incisions in a simple interrupted fashion. Mastisol and Steri-Strips were to all incisions. At this time, the procedure was complete. At the conclusion of the case all sponge needles and instrument counts were correct. The patient tolerated well and was taken to the [address] [place] [address] Unit. They will be given outpatient follow-up in approximately 1-2 weeks.     Antwan Conde [address] Sabine Blancas DO

## 2023-03-13 NOTE — ANESTHESIA PRE PROCEDURE
Department of Anesthesiology  Preprocedure Note       Name:  Rosey Arcos   Age:  58 y.o.  :  1960                                          MRN:  [id number]         Date:  3/13/2023      Surgeon: Amna Rollins):  Cezar Villanueva DO    Procedure: Procedure(s): HERNIA VENTRAL REPAIR LAPAROSCOPIC ROBOTIC    Medications prior to admission:   Prior to Admission medications    Medication Sig Start Date End Date Taking? Authorizing Provider   lactulose (CHRONULAC) 10 GM/15ML solution Take 10 g by mouth daily. Historical Provider, MD   nicotine polacrilex (NICORETTE) 2 MG gum Take 2 mg by mouth as needed for Smoking cessation.  Take 1 piece every 2 hours as needed    Historical Provider, MD   fluticasone-umeclidin-vilant (TRELEGY ELLIPTA) 100-62.5-25 MCG/INH AEPB Inhale 1 puff into the lungs daily 10/31/22   Jakub Mccoy   levETIRAcetam (KEPPRA) 750 MG tablet Take 1 tablet by mouth 2 times daily 10/31/22 3/13/23  May Jakub Byrne   hydrALAZINE (APRESOLINE) 50 MG tablet Take 1 tablet by mouth every 8 hours 10/31/22 3/13/23  May Jakub Byrne   losartan (COZAAR) 100 MG tablet Take 1 tablet by mouth daily 11/1/22 3/13/23  May Jakub Byrne   amLODIPine (NORVASC) 10 MG tablet Take 1 tablet by mouth daily 11/1/22 3/13/23  Jakub Mccoy   pantoprazole (PROTONIX) 40 MG tablet Take 1 tablet by mouth 2 times daily (before meals) 10/31/22 11/30/22  May Jakub Byrne   spironolactone (ALDACTONE) 50 MG tablet Take 50 mg by mouth daily  Patient not taking: Reported on 3/13/2023    Historical Provider, MD   cholestyramine light (PREVALITE) 4 GM/DOSE powder Take by mouth 2 times daily (with meals)  Patient not taking: Reported on 3/13/2023    Ar Automatic Reconciliation   ergocalciferol (ERGOCALCIFEROL) 1.25 MG (19416 UT) capsule Take 50,000 Units by mouth  Patient not taking: Reported on 3/13/2023    Ar Automatic Reconciliation   hydrOXYzine (ATARAX) 25 MG tablet Take 25 mg by mouth every 4 hours as needed  Patient not taking: Reported on 3/13/2023 9/10/12   Ar Automatic Reconciliation   magnesium oxide (MAG-OX) 400 MG tablet Take 400 mg by mouth 2 times daily  Patient not taking: Reported on 3/13/2023 9/25/12   Ar Automatic Reconciliation   potassium chloride (KLOR-CON M) 20 MEQ extended release tablet Take 20 mEq by mouth daily  Patient not taking: Reported on 3/13/2023 9/10/12   Ar Automatic Reconciliation       Current medications:    No current facility-administered medications for this visit. No current outpatient medications on file.      Facility-Administered Medications Ordered in Other Visits   Medication Dose Route Frequency Provider Last Rate Last Admin    sodium chloride flush 0.9 % injection 5-40 mL  5-40 mL IntraVENous 2 times per day Lynnetta Stef, APRN - CNP   10 mL at 03/13/23 0925    sodium chloride flush 0.9 % injection 5-40 mL  5-40 mL IntraVENous PRN Lynnetta Stef, APRN - CNP        0.9 % sodium chloride infusion   IntraVENous PRN Lynnetta Stef, APRN - CNP        ondansetron (ZOFRAN-ODT) disintegrating tablet 4 mg  4 mg Oral Q8H PRN Lynnetta Stef, APRN - CNP        Or    ondansetron (ZOFRAN) injection 4 mg  4 mg IntraVENous Q6H PRN Lynnetta Stef, APRN - CNP        famotidine (PEPCID) 20 mg in sodium chloride (PF) 0.9 % 10 mL injection  20 mg IntraVENous Daily Lynnetta Stef, APRN - CNP   20 mg at 03/13/23 0219    acetaminophen (TYLENOL) tablet 650 mg  650 mg Oral Q6H PRN Lynnetta Stef, APRN - CNP        Or    acetaminophen (TYLENOL) suppository 650 mg  650 mg Rectal Q6H PRN Lynnetta Stef, APRN - CNP        0.9 % sodium chloride infusion   IntraVENous Continuous Lynnetta Stef, APRN -  mL/hr at 03/13/23 0218 New Bag at 03/13/23 0218    oxyCODONE-acetaminophen (PERCOCET) 7.5-325 MG per tablet 1 tablet  1 tablet Oral Q4H PRN Lynnetta Stef, APRN - CNP        hydrALAZINE (APRESOLINE) injection 10 mg  10 mg IntraVENous Q6H PRN Lynnetta Stef, APRN - CNP   10 mg at 03/13/23 0245    diatrizoate meglumine-sodium (GASTROGRAFIN) 66-10 % solution 15 mL  15 mL Oral ONCE PRN NYA Rodas - CNP   15 mL at 03/13/23 3333       Allergies: Allergies   Allergen Reactions    Lisinopril Angioedema       Problem List:    Patient Active Problem List   Diagnosis Code    Thrombocytopenia, unspecified (HCC) D69.6    Hypomagnesemia E83.42    Hepatitis K75.9    Itching L29.9    Jaundice R17    HTN (hypertension) I10    Decompensated HCV cirrhosis (HCC) B19.20, 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    Hypertensive urgency I16.0    Demand ischemia (HCC) I24.8    Ascites R18.8    QT prolongation R94.31    Hypertensive emergency I16.1    Non-surgical epigastric abdominal pain R10.13    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       Past Medical History:        Diagnosis Date    Hypertension     Infectious disease     hep c       Past Surgical History:        Procedure Laterality Date    ORTHOPEDIC SURGERY      evie in right leg    UPPER GASTROINTESTINAL ENDOSCOPY N/A 10/28/2022    EGD BIOPSY performed by Robert Breaux MD at [facility] END[facility]COPY       Social History:    Social History     Tobacco Use    Smoking status: Every Day     Packs/day: 2.00     Types: Cigarettes    Smokeless tobacco: Current   Substance Use Topics    Alcohol use: Yes                                Ready to quit: Not Answered  Counseling given: Not Answered      Vital Signs (Current): There were no vitals filed for this visit.                                            BP Readings from Last 3 Encounters:   03/13/23 (!) 216/111   11/17/22 132/80   11/15/22 (!) 156/90       NPO Status:                                                                                 BMI:   Wt Readings from Last 3 Encounters:   03/13/23 140 lb (63.5 kg)   10/27/22 157 lb 8 oz (71.4 kg)   09/23/22 157 lb (71.2 kg)     There is no height or weight on file to calculate BMI.    CBC:   Lab Results   Component Value Date/Time    WBC 4.4 03/12/2023 09:05 PM    RBC 3.37 03/12/2023 09:05 PM    HGB 11.0 03/12/2023 09:05 PM    HCT 32.2 03/12/2023 09:05 PM    MCV 95.5 03/12/2023 09:05 PM    RDW 13.7 03/12/2023 09:05 PM    PLT 91 03/12/2023 09:05 PM       CMP:   Lab Results   Component Value Date/Time     03/13/2023 06:17 AM    K 3.0 03/13/2023 06:17 AM     03/13/2023 06:17 AM    CO2 21 03/13/2023 06:17 AM    BUN 26 03/13/2023 06:17 AM    CREATININE 1.40 03/13/2023 06:17 AM    GFRAA 59 09/24/2022 01:45 AM    LABGLOM 43 03/13/2023 06:17 AM    GLUCOSE 92 03/13/2023 06:17 AM    PROT 7.5 03/13/2023 06:17 AM    CALCIUM 8.4 03/13/2023 06:17 AM    BILITOT 1.4 03/13/2023 06:17 AM    ALKPHOS 107 03/13/2023 06:17 AM    AST 23 03/13/2023 06:17 AM    ALT 16 03/13/2023 06:17 AM       POC Tests: No results for input(s): POCGLU, POCNA, POCK, POCCL, POCBUN, POCHEMO, POCHCT in the last 72 hours.     Coags: No results found for: PROTIME, INR, APTT    HCG (If Applicable): No results found for: PREGTESTUR, PREGSERUM, HCG, HCGQUANT     ABGs: No results found for: PHART, PO2ART, JAO5YUE, ZQV5HQQ, BEART, W8JWUIMH     Type & Screen (If Applicable):  No results found for: LABABO, LABRH    Drug/Infectious Status (If Applicable):  Lab Results   Component Value Date/Time    HEPCAB REACTIVE 09/24/2022 03:45 AM       COVID-19 Screening (If Applicable):   Lab Results   Component Value Date/Time    COVID19 NOT DETECTED 10/27/2022 12:17 AM           Anesthesia Evaluation  Patient summary reviewed and Nursing notes reviewed no history of anesthetic complications:   Airway: Mallampati: II  TM distance: >3 FB   Neck ROM: full  Mouth opening: > = 3 FB   Dental: normal exam   (+) poor dentition      Pulmonary:normal exam    (+) current smoker (~2 ppd)                          ROS comment: Currently positive for Rhinovirus, productive cough   Cardiovascular:  Exercise tolerance: poor (<4 METS),   (+) hypertension:,       ECG reviewed      Echocardiogram reviewed    Cleared by cardiology           ROS comment: TTE 10/27/2022   Left Ventricle: Normal left ventricular systolic function with a visually estimated EF of 50 - 55%. Left ventricle size is normal. Mildly increased wall thickness. Normal wall motion. Abnormal diastolic function.   Aortic Valve: Tricuspid valve. Mild regurgitation with a centrally directed jet.   Pericardium: Trivial pericardial effusion present. No indication of cardiac tamponade. Evidence includes normal LV size, no septal bounce, no IVC dilation and no chamber collapse. Neuro/Psych:   (+) seizures:,             GI/Hepatic/Renal:   (+) hepatitis: C, liver disease (EtOH cirrhosis):,          ROS comment: Abdominal pain, ascites. Endo/Other:    (+) blood dyscrasia: anemia and thrombocytopenia:., .                 Abdominal:             Vascular: Other Findings:             Anesthesia Plan      general     ASA 3       Induction: intravenous. Anesthetic plan and risks discussed with patient.                         Trina Astudillo MD   3/13/2023

## 2023-03-13 NOTE — DISCHARGE INSTRUCTIONS
DISCHARGE INSTRUCTIONS    Please call our office for a follow-up appointment in 1-2 week(s). Driving: No driving while taking pain medications    Activity: No strenous activity. Slowly advance as tolerated. No lifting greater than 20lbs for 3 weeks after surgery. Diet:  Slowly advance as tolerated. Increase your fluids and fiber, as pain medications may cause constipation. No alcoholic beverages. Bathing: You may shower. No tub baths for 5 days. Dressing: If outer dressing, remove in 24 hours. Leave steri strips intact for 1 week and then remove. Other:  Ice to incision as needed for pain. If drains present, empty and record    output daily. Call Dr. Antoine Sawant if you have:  ? Temperature greater than 100.4  ? Persistent nausea and vomiting  ? Severe uncontrolled pain  ? Redness, tenderness, or signs of infection (pain, swelling, redness, odor or green/yellow discharge around the site)  ? Difficulty breathing, headache or visual disturbances  ? Hives  ? Persistent dizziness or light-headedness  ? Extreme fatigue  ? Any other questions or concerns you may have after discharge    In an emergency, call 911 or go to an Emergency Department at [address] [place] [address] or [facility].    It is important to bring a complete, current list of your medications to any medical appointments or hospitalizations. Do not get constipated! No more than 2 days without a bm. If 2 days no bm, then take colace stool softener twice a day. If 24 hours of colace does not produce a bm , then keep taking colace and add miralax laxative twice a day until you have a bm. Once you are having regular bms, you can use colace and miralax as needed. If problems (fever, signs of infection, uncontrolled bleeding or drainage from surgical incision, uncontrolled pain, uncontrolled nausea and/or vomiting) or any surgical questions arise, please call our office at [phone]. [address] [facility] I understand and acknowledge receipt of the above instructions. _____________________________                                                                                                                                        Patient or Guardian Signature                                                         Date/Time      ______________________________                                                                                                                                      [id number] or MALIK's Signature                                                        Date/Time      The discharge instructions have been reviewed with the patient and/or Guardian. Patient and/or Guardian signed and retained a printed copy. S[facility] Hernia Repair: What to Expect at [address] are likely to have pain for the next few days. You may also feel tired and have less energy than normal. This is common. You should feel better after a few days and will probably feel much better in 7 days. For several weeks you may feel discomfort or pulling in the hernia repair when you move. You may have some bruising near the repair site and on your genitals. This is normal. If you have swelling in the genitals, you may be told to wear well-fitting briefs. Dyyno bicycle shorts may also provide good support. This care sheet gives you a general idea about how long it will take for you to recover. But each person recovers at a different pace. Follow the steps below to get better as quickly as possible. How can you care for yourself at home? Activity    Rest when you feel tired. Getting enough sleep will help you recover. Try to walk each day. Start by walking a little more than you did the day before. Bit by bit, increase the amount you walk. Walking boosts blood flow and helps prevent pneumonia and constipation.      Avoid strenuous activities, such as biking, jogging, weight lifting, or aerobic exercise, until your doctor says it is okay. Avoid lifting anything that would make you strain. This may include heavy grocery bags and milk containers, a heavy briefcase or backpack, cat litter or dog food bags, a vacuum , or a child. You may drive when you are no longer taking pain medicine and can quickly move your foot from the gas pedal to the brake. You must also be able to sit comfortably for a long period of time, even if you do not plan to go far. You might get caught in traffic. Most people are able to return to work within 1 to 2 weeks after surgery. You may shower 24 to 48 hours after surgery, if your doctor okays it. Pat the cut (incision) dry. Do not take a bath for the first 2 weeks, or until your doctor tells you it is okay. Your doctor will tell you when you can have sex again. Diet    You can eat your normal diet. If your stomach is upset, try bland, low-fat foods like plain rice, broiled chicken, toast, and yogurt. Drink plenty of fluids (unless your doctor tells you not to). You may notice that your bowel movements are not regular right after your surgery. This is common. Avoid constipation and straining with bowel movements. You may want to take a fiber supplement every day. If you have not had a bowel movement after a couple of days, ask your doctor about taking a mild laxative. Medicines    Your doctor will tell you if and when you can restart your medicines. He or she will also give you instructions about taking any new medicines. If you stopped taking aspirin or some other blood thinner, your doctor will tell you when to start taking it again. Be safe with medicines. Take pain medicines exactly as directed. If the doctor gave you a prescription medicine for pain, take it as prescribed.   If you are not taking a prescription pain medicine, take an over-the-counter medicine such as acetaminophen (Tylenol), ibuprofen (Advil, Motrin), or naproxen (Corazon). Read and follow all instructions on the label. Do not take two or more pain medicines at the same time unless the doctor told you to. Many pain medicines have acetaminophen, which is Tylenol. Too much acetaminophen (Tylenol) can be harmful. If your doctor prescribed antibiotics, take them as directed. Do not stop taking them just because you feel better. You need to take the full course of antibiotics. If you think your pain medicine is making you sick to your stomach: Take your medicine after meals (unless your doctor has told you not to). Ask your doctor for a different pain medicine. Incision care    If you have strips of tape on the cut (incision) the doctor made, leave the tape on for a week or until it falls off. If you have staples closing the cut, you will need to visit your doctor in 1 to 2 weeks to have them removed. Wash the area daily with warm, soapy water and pat it dry. Follow-up care is a key part of your treatment and safety. Be sure to make and go to all appointments, and call your doctor if you are having problems. It's also a good idea to know your test results and keep a list of the medicines you take. When should you call for help? Call 911 anytime you think you may need emergency care. For example, call if:    You passed out (lost consciousness). You are short of breath. Call your doctor now or seek immediate medical care if:    You have pain that does not get better after you take pain medicine. You are sick to your stomach and cannot keep fluids down. You have signs of a blood clot in your leg (called a deep vein thrombosis), such as:  Pain in your calf, back of the knee, thigh, or groin. Redness and swelling in your leg or groin. You cannot pass stools or gas. Bright red blood has soaked through the bandage over your incision. You have loose stitches, or your incision comes open.      You have signs of infection, such as:  Increased pain, swelling, warmth, or redness.  Red streaks leading from the incision.  Pus draining from the incision.  A fever.   Watch closely for any changes in your health, and be sure to contact your doctor if you have any problems.  Where can you learn more?  Go to https://www.[employer].net/patientEd and enter G367 to learn more about \"Hernia Repair: What to Expect at Home.\"  Current as of: January 20, 2022               Content Version: 13.5  © 2006-2022 [employer].   Care instructions adapted under license by [employer]. If you have questions about a medical condition or this instruction, always ask your healthcare professional. [employer] disclaims any warranty or liability for your use of this information.

## 2023-03-13 NOTE — ED NOTES
TRANSFER - OUT REPORT:    Verbal report given to Modesto Thacker RN on [employer]  being transferred to [facility] for routine progression of patient care       Report consisted of patient's Situation, Background, Assessment and   Recommendations(SBAR). Information from the following report(s) Nurse Handoff Report, Intake/Output, MAR, and Recent Results was reviewed with the receiving nurse. [place] Assessment: No data recorded  Lines:   Peripheral IV 03/12/23 Right Antecubital (Active)   Site Assessment Clean, dry & intact 03/12/23 2051   Line Status Blood return noted 03/12/23 2051   Phlebitis Assessment No symptoms 03/12/23 2051   Infiltration Assessment 0 03/12/23 2051        Opportunity for questions and clarification was provided.       Patient transported with:  Loly Wisdom RN  03/13/23 8296

## 2023-03-13 NOTE — ED TRIAGE NOTES
PT arrives via EMS with complaints of abdominal pain around belly button that started this AM. Ems /101.  NAD, A&Ox4

## 2023-03-13 NOTE — H&P
H&P/Consult Note/Progress Note/Office Note:   Loretta Brar  MRN: [id number]  :1960  Age:62 y.o.    HPI: Loretta Brar is a 58 y.o. female who presented to the ER with acute onset abdominal pain 12 hours ago associated to obstipation. Denies nausea or vomiting. No fevers or chills. CT of the abdomen shows a parietal hernia between the rectus muscles and umbilical area. She has never had abdominal operations before           Past Medical History:   Diagnosis Date    Hypertension     Infectious disease     hep c     Past Surgical History:   Procedure Laterality Date    ORTHOPEDIC SURGERY      evie in right leg    UPPER GASTROINTESTINAL ENDOSCOPY N/A 10/28/2022    EGD BIOPSY performed by Tom Stark MD at [facility] ENDOSCOPY     Current Facility-Administered Medications   Medication Dose Route Frequency    0.9 % sodium chloride bolus  1,000 mL IntraVENous NOW     Current Outpatient Medications   Medication Sig    lactulose (CONSTULOSE) 10 GM/15ML solution Take 10 g by mouth daily. nicotine polacrilex (NICORETTE) 2 MG gum Take 2 mg by mouth as needed for Smoking cessation.  Take 1 piece every 2 hours as needed    fluticasone-umeclidin-vilant (TRELEGY ELLIPTA) 100-62.5-25 MCG/INH AEPB Inhale 1 puff into the lungs daily    levETIRAcetam (KEPPRA) 750 MG tablet Take 1 tablet by mouth 2 times daily    hydrALAZINE (APRESOLINE) 50 MG tablet Take 1 tablet by mouth every 8 hours    losartan (COZAAR) 100 MG tablet Take 1 tablet by mouth daily    amLODIPine (NORVASC) 10 MG tablet Take 1 tablet by mouth daily    pantoprazole (PROTONIX) 40 MG tablet Take 1 tablet by mouth 2 times daily (before meals)    spironolactone (ALDACTONE) 50 MG tablet Take 50 mg by mouth daily    cholestyramine light (PREVALITE) 4 GM/DOSE powder Take by mouth 2 times daily (with meals)    ergocalciferol (ERGOCALCIFEROL) 1.25 MG (42886 UT) capsule Take 50,000 Units by mouth    hydrOXYzine (ATARAX) 25 MG tablet Take 25 mg by mouth every 4 hours as needed magnesium oxide (MAG-OX) 400 MG tablet Take 400 mg by mouth 2 times daily    potassium chloride (KLOR-CON M) 20 MEQ extended release tablet Take 20 mEq by mouth daily     ALLERGIES:  Lisinopril    Social History     Socioeconomic History    Marital status: Single   Tobacco Use    Smoking status: Every Day     Packs/day: 2.00     Types: Cigarettes    Smokeless tobacco: Current   Substance and Sexual Activity    Alcohol use: Yes    Drug use: No     Social History     Tobacco Use   Smoking Status Every Day    Packs/day: 2.00    Types: Cigarettes   Smokeless Tobacco Current     Family History   Problem Relation Age of Onset    Lung Disease Mother      ROS: The patient has no difficulty with chest pain or shortness of breath. No fever or chills. Comprehensive review of systems was otherwise unremarkable except as noted above. Physical Exam:   BP (!) 211/110   Pulse 86   Temp 97.8 °F (36.6 °C) (Oral)   Resp 14   Ht 5' 7\" (1.702 m)   Wt 140 lb (63.5 kg)   SpO2 98%   BMI 21.93 kg/m²   Vitals:    03/12/23 2146 03/12/23 2226 03/12/23 2245 03/12/23 2256   BP: (!) 192/100 (!) 197/107 (!) 208/111 (!) 211/110   Pulse: 80 88 86 86   Resp: 21 12 20 14   Temp:       TempSrc:       SpO2: 96% 99% 97% 98%   Weight:       Height:         No intake/output data recorded. No intake/output data recorded. Constitutional: Alert, oriented, cooperative patient in no acute distress; appears stated age    Eyes:Sclera are clear. EOMs intact  ENMT: no external lesions gross hearing normal; no obvious neck masses, no ear or lip lesions, nares normal  CV: RRR. Normal perfusion  Resp: No JVD. Breathing is  non-labored; no audible wheezing. GI: soft and -distended , no peritonitis. No hernia defects appreciated    Musculoskeletal: unremarkable with normal function. No embolic signs or cyanosis.    Neuro:  Oriented; moves all 4; no focal deficits  Psychiatric: normal affect and mood, no memory impairment    Recent vitals (if inpt):  Patient Vitals for the past 24 hrs:   BP Temp Temp src Pulse Resp SpO2 Height Weight   03/12/23 2256 (!) 211/110 -- -- 86 14 98 % -- --   03/12/23 2245 (!) 208/111 -- -- 86 20 97 % -- --   03/12/23 2226 (!) 197/107 -- -- 88 12 99 % -- --   03/12/23 2146 (!) 192/100 -- -- 80 21 96 % -- --   03/12/23 2115 (!) 202/98 -- -- 76 22 98 % -- --   03/12/23 2100 (!) 190/98 -- -- 83 15 99 % -- --   03/12/23 2056 (!) 190/94 -- -- 82 21 98 % -- --   03/12/23 2048 (!) 189/95 97.8 °F (36.6 °C) Oral 88 19 98 % 5' 7\" (1.702 m) 140 lb (63.5 kg)       Amount and/or Complexity of Data Reviewed and Analyzed:  I reviewed and analyzed all of the unique labs and radiologic studies that are shown below as well as any that are in the HPI, and any that are in the expanded problem list below  *Each unique test, order, or document contributes to the combination of 2 or combination of 3 in Category 1 below. For this visit I also reviewed old records and prior notes.       Recent Labs     03/12/23 2105   WBC 4.4   HGB 11.0*   PLT 91*   *   K 3.5      CO2 23   BUN 28*   ALT 23     Review of most recent CBC  Lab Results   Component Value Date    WBC 4.4 03/12/2023    HGB 11.0 (L) 03/12/2023    HCT 32.2 (L) 03/12/2023    MCV 95.5 03/12/2023    PLT 91 (L) 03/12/2023       Review of most recent BMP  Lab Results   Component Value Date/Time     03/12/2023 09:05 PM    K 3.5 03/12/2023 09:05 PM     03/12/2023 09:05 PM    CO2 23 03/12/2023 09:05 PM    BUN 28 03/12/2023 09:05 PM    CREATININE 1.50 03/12/2023 09:05 PM    GLUCOSE 128 03/12/2023 09:05 PM    CALCIUM 9.2 03/12/2023 09:05 PM        Review of most recent LFTs (and lipase if done)  Lab Results   Component Value Date    ALT 23 03/12/2023    AST 45 (H) 03/12/2023    ALKPHOS 134 03/12/2023    BILITOT 1.3 (H) 03/12/2023     Lab Results   Component Value Date    LIPASE 401 (H) 03/12/2023       No results found for: INR, APTT, LCAD    Review of most recent HgbA1c  No results found for: LABA1C    Nutritional assessment screen for wound healing issues:  Lab Results   Component Value Date    LABALBU 3.0 (L) 03/12/2023       @lastcovr@    Xray Result (most recent):  XR CHEST STANDARD TWO VW 10/27/2022    Narrative  EXAM: XR CHEST (2 VW)    HISTORY: fatigue/malaise. TECHNIQUE: Frontal and lateral chest.    COMPARISON: 5/5/2017    FINDINGS:  The cardiac silhouette, mediastinum, and pulmonary vasculature are within normal  limits. There is no consolidation, pleural effusion, or pneumothorax. Mild blunting of  the right costophrenic angle may be due to atelectasis. No significant osseous abnormalities are observed. Impression  Mild blunting of the right costophrenic angle may be due to atelectasis. Otherwise, no evidence of an acute intrathoracic process. CT Result (most recent):  CT ABDOMEN PELVIS W IV CONTRAST 03/12/2023    Narrative  EXAMINATION: CT SCAN OF THE ABDOMEN AND PELVIS WITH INTRAVENOUS CONTRAST    DATE OF EXAM: 3/12/2023 9:45 PM    HISTORY: ant abdomen hernia with pain    COMPARISON: None. TECHNIQUE: CT examination of the abdomen and pelvis was performed following the  intravenous administration of 100 mL of Omnipaque 350. CT dose lowering  techniques were used, to include: automated exposure control, adjustment for  patient size, and/or use of iterative reconstruction. FINDINGS:    ABDOMEN/PELVIS:    Lower Chest: Normal.    Liver: Severely cirrhotic liver with an irregular, nodular contour. No focal  mass is identified but the liver is so heterogeneous that this really cannot be  excluded. Further evaluation could be performed with correlation with AFP and  possible follow-up MRI. Gallbladder/Biliary: Gallstones but no acute cholecystitis    Pancreas: Normal.    Spleen: Normal.    Adrenal Glands: Normal.    Kidneys: Normal.    GI Tract:  There is a small anterior abdominal wall hernia located between the  rectus abdominis muscles above the umbilicus with a short segment of obstructed  small bowel measuring about 3 cm across. The bowel is both obstructed within the  hernia sac and proximal to it. Bowel wall enhances normally. No perforation,  abscess or obstruction. Mesentery/Peritoneum: Normal.    Vasculature: Normal.    Lymph Nodes: Normal.    Abdominal Wall: Normal.    Bladder: Normal.    Reproductive: Normal.    Musculoskeletal: Normal.    Impression  1. Midline anterior abdominal wall hernia between the rectus abdominis muscles  containing a short segment of small bowel that is both obstructed within the  hernia sac and resulting in a high-grade small bowel obstruction proximal to it. 2.  Bowel wall enhances normally. No evidence of perforation or ischemia  currently. 3.  Severely cirrhotic liver. 4.  Gallstones but no acute cholecystitis    Wes Hernandez M.D.  3/12/2023 10:38:00 PM    US Result (most recent):  US ABDOMEN COMPLETE 10/27/2022    Narrative  ABDOMINAL  ULTRASOUND    INDICATION: Cirrhosis, ascites    COMPARISON: None    Transabdominal imaging of the upper abdomen was performed. FINDINGS:  LIVER: 14.8 cm. Lobulation of the contour the liver and some heterogeneity. Mild focal bulging of the capsule and the left lobe of liver concerning for  mass. Normal flow in the portal vein. BILE DUCTS: No intrahepatic bile duct dilatation. CBD diameter = 5 mm. GALLBLADDER: Numerous stones filling the gallbladder. PANCREAS: Normal.  SPLEEN: Normal.    RIGHT KIDNEY: 9.1 cm. No mass or hydronephrosis. LEFT KIDNEY: 10.4 cm. No mass or hydronephrosis. ABDOMINAL AORTA AND IVC: Normal in size. ASCITES: Mild ascites. Impression  1. Cirrhotic appearance of the liver. Possible mass in the left lobe. Liver  protocol CT/MRI is recommended. 2.  Cholelithiasis. 3.  Mild ascites.       Admission date (for inpatients): 3/12/2023   * No surgery found *  * No surgery found *        ASSESSMENT/PLAN:  [unfilled]  Active Problems:    * No active hospital problems. *  Resolved Problems:    * No resolved hospital problems. *     Patient Active Problem List    Diagnosis Date Noted    Hypertensive emergency 10/27/2022     Priority: High    Hypertensive urgency 10/27/2022     Priority: Medium    Demand ischemia ([facility] 10/27/2022     Priority: Medium    Ascites 10/27/2022     Priority: Medium    QT prolongation 10/27/2022     Priority: Medium    Non-surgical epigastric abdominal pain 10/27/2022     Priority: Medium    Hypomagnesemia 09/25/2012    Decompensated HCV cirrhosis ([facility] 09/24/2012    Thrombocytopenia, unspecified ([facility] 09/10/2012     Plt count 30K, need to assess spleen and liver again  1-09-13 platelets still relatively stable no new bleeding spleen not   enlarged could be antibody induced direct antibody positive        Hepatitis 09/10/2012     HeP C, no tx in past  1-09-13 treatment could improve platelet count    Ref. Range 9/5/2011 21:25 9/13/2011 11:30 1/9/2012 15:26 4/22/2012 17:35   9/10/2012 14:15 9/24/2012 11:05 10/10/2012 09:22 1/9/2013 09:05   ALT Latest Range: 12-65 U/L 92 (H) 87 (H) 74 (H) 82 (H) 84 (H) 70 (H) 61   68 (H)   AST Latest Range: 15-37 U/L 158 (H) 128 (H) 58 (H) 111 (H) 150 (H) 110 (H)   84 (H) 79 (H)   Alk.  phosphatase Latest Range:  U/L 287 (H) 305 (H) 280 (H) 162 (H)   198 (H) 205 (H) 315 (H) 244 (H)   4-23-13 still no treatment still stable abnormal liver function         Itching 09/10/2012     Puritis constant        Jaundice 09/10/2012     Sclera        HTN (hypertension) 09/10/2012     Has been out of medication and not gotten refill              Number and Complexity of Problems addressed and   Risks of complications and/or morbidity of management                  Level of MDM (2/3 elements below)  Number and Complexity of Problems Addressed Amount and/or Complexity of Data to be Reviewed and Analyzed  *Each unique test, order, or document contributes to the combination of 2 or combination of 3 in Category 1 below. Risk of Complications and/or Morbidity or Mortality of pt Management     11171  16283 SF Minimal  ?1self-limited or minor problem Minimal or none Minimal risk of morbidity from additional diagnostic testing or Rx   27518  68981 Low Low  ? 2or more self-limited or minor problems;    or  ? 1stable chronic illness;    or  ?1acute, uncomplicated illness or injury   Limited  (Must meet the requirements of at least 1 of the 2 categories)  Category 1: Tests and documents   ? Any combination of 2 from the following:  ?Review of prior external note(s) from each unique source*;  ?review of the result(s) of each unique test*;   ?ordering of each unique test*    or   Category 2: Assessment requiring an independent historian(s)  (For the categories of independent interpretation of tests and discussion of management or test interpretation, see moderate or high) Low risk of morbidity from additional diagnostic testing or treatment     60860  22617 Mod Moderate  ? 1or more chronic illnesses with exacerbation, progression, or side effects of treatment;    or  ?2or more stable chronic illnesses;    or  ?1undiagnosed new problem with uncertain prognosis;    or  ?1acute illness with systemic symptoms;    or  ?1acute complicated injury   Moderate  (Must meet the requirements of at least 1 out of 3 categories)  Category 1: Tests, documents, or independent historian(s)  ? Any combination of 3 from the following:   ?Review of prior external note(s) from each unique source*;  ?Review of the result(s) of each unique test*;  ?Ordering of each unique test*;  ?Assessment requiring an independent historian(s)    or  Category 2: Independent interpretation of tests   ? Independent interpretation of a test performed by another physician/other qualified health care professional (not separately reported);     or  Category 3: Discussion of management or test interpretation  ? Discussion of management or test interpretation with external physician/other qualified health care professional/appropriate source (not separately reported)   Moderate risk of morbidity from additional diagnostic testing or treatment  Examples only:  ?Prescription drug management   ?Decision regarding minor surgery with identified patient or procedure risk factors  ?Decision regarding elective major surgery without identified patient or procedure risk factors   ?Diagnosis or treatment significantly limited by social determinants of health       23119  38958 High High  ?1or more chronic illnesses with severe exacerbation, progression, or side effects of treatment;    or  ?1 acute or chronic illness or injury that poses a threat to life or bodily function   Extensive  (Must meet the requirements of at least 2 out of 3 categories)  Category 1: Tests, documents, or independent historian(s)  ?Any combination of 3 from the following:   ?Review of prior external note(s) from each unique source*;  ?Review of the result(s) of each unique test*;   ?Ordering of each unique test*;   ?Assessment requiring an independent historian(s)    or   Category 2: Independent interpretation of tests   ?Independent interpretation of a test performed by another physician/other qualified health care professional (not separately reported);     or  Category 3: Discussion of management or test interpretation  ?Discussion of management or test interpretation with external physician/other qualified health care professional/appropriate source (not separately reported)   High risk of morbidity from additional diagnostic testing or treatment  Examples only:  ?Drug therapy requiring intensive monitoring for toxicity  ?Decision regarding elective major surgery with identified patient or procedure risk factors  ?Decision regarding emergency major surgery  ?Decision regarding hospitalization  ?Decision not to resuscitate or to de-escalate care because of poor prognosis      Parenteral controlled substances    I have personally performed a face-to-face diagnostic evaluation and management  service on this patient. I have independently seen the patient. I have independently obtained the above history from the patient/family. I have independently examined the patient with above findings. I have independently reviewed data/labs for this patient and developed the above plan of care (MDM). A/P  Acute onset abdominal pain. CT scan showed a intraparietal hernia. ER physician apparently reduced hernia and she fells better. I examined her and still has subtle abdominal tenderness. I thin is prudent to admit her for observation and do another CT scan of the abdomen.   -NPO  -CT with PO contrast      Signed: Sukhwinder Birmingham MD  3/12/2023    11:26 PM

## 2023-03-13 NOTE — PERIOP NOTE
Dr. Yasmin Mallory called in regards to patient's elevated BP s.  Verbal order for 10mg hydralazine IV push

## 2023-03-13 NOTE — PROGRESS NOTES
GENERAL SURGERY PROGRESS NOTE    Name:  Hilary Alex Date/Time of Admission: 3/12/2023  8:46 PM  CSN: [id number]  Attending Provider: [facility], *  Room/Bed:  MOR/PL : 1960 62 y.o.      3/13/2023       SUBJECTIVE:    Patient seen and examined. Pt with still incarcerated hernia, with some pain.  Appears bowel is no longer incarcerated but does have fat containing hernia    Current Meds:  Scheduled Meds:   sodium chloride flush  5-40 mL IntraVENous 2 times per day    famotidine (PEPCID) injection  20 mg IntraVENous Daily    ceFAZolin 2000 mg in 20 mL SWFI IV Syringe  2,000 mg IntraVENous On Call to OR     Continuous Infusions:   sodium chloride      sodium chloride 100 mL/hr at 23 0218     PRN Meds:.sodium chloride flush, sodium chloride, ondansetron **OR** ondansetron, acetaminophen **OR** acetaminophen, oxyCODONE-acetaminophen, hydrALAZINE, diatrizoate meglumine-sodium     OBJECTIVE:    Vital Signs:  Vitals:    23 1124   BP: (!) 208/107   Pulse:    Resp:    Temp:    SpO2:         I/O:    I/O last 3 completed shifts:  In: -   Out: 200 [Urine:200]   I/O this shift:  In: -   Out: 100 [Urine:100]      PHYSICAL EXAMINATION  General  Alert, cooperative, NAD  Lungs Unlabored breathing  Heart RRR  Abdominal Soft, nontender, nondistended, no involuntary guarding or rebound appreciated   Extremities No edema or gross deformities noted appreciated   Incisions N/a    Labs:   Lab Results   Component Value Date    WBC 4.4 2023    HGB 11.0 (L) 2023    HCT 32.2 (L) 2023    MCV 95.5 2023    PLT 91 (L) 2023        Lab Results   Component Value Date/Time     2023 06:17 AM    K 3.0 2023 06:17 AM     2023 06:17 AM    CO2 21 2023 06:17 AM    BUN 26 2023 06:17 AM    CREATININE 1.40 2023 06:17 AM    GLUCOSE 92 2023 06:17 AM    CALCIUM 8.4 2023 06:17 AM         Imaging Studies: CT Result (most recent):  CT ABDOMEN PELVIS W IV CONTRAST 03/13/2023    Narrative  CT OF THE ABDOMEN AND PELVIS WITH CONTRAST. CLINICAL INDICATION: Ventral hernia, follow-up exam, prior bowel obstruction    PROCEDURE: Serial thin section axial images obtained from the lung bases through  the proximal femurs following the administration of 100 cc of Isovue 370  intravenous contrast.  Radiation dose reduction techniques were used for this  study. Our CT scanners use one or all of the following: Automated exposure  control, adjusted of the mA and/or kV according to patient size, iterative  reconstruction    COMPARISON: CT abdomen pelvis dated 3/12/2023    FINDINGS:    CT ABDOMEN: There is a cirrhotic morphology to the liver. The spleen is  unremarkable. The stomach is unremarkable. Gallstones are noted in the  gallbladder. The pancreas is normal. The adrenal glands are normal. The kidneys  are symmetric in size and contrast enhancement. There is no hydronephrosis. At  the site of the prior ventral hernia, there is a small amount of fluid in the  hernia sac. No herniated bowel loops evident. The small bowel is normal in  caliber. There is no small bowel obstruction. The appendix is normal. The colon  is unremarkable. CT PELVIS: The uterus is normal. There is a calcified lesion in the normal size  left ovary. The rectum is unremarkable. There is trace free fluid in the  cul-de-sac. There is no inguinal hernia or adenopathy. The bladder is  well-distended and normal. There is residual contrast in the bladder from the  prior exam.    No aggressive bone lesions identified. Impression  1. There is a small amount of fluid in the ventral hernia sac. No herniated  loops of bowel or bowel obstruction evident. 2. Calcified lesion in the left ovary. Note the left ovary is normal in size. 3. Trace free fluid in the cul-de-sac. 4. Cirrhosis  5. Cholelithiasis.       ASSESSMENT:    Principal Problem:    Interparietal hernia  Resolved Problems:    * No resolved hospital problems. *    -AFVSS      PLAN:    Plan for robotic hernia repair  NPO  Consent  Risks of surgery discussed with pt and/or family present include risks of anesthesia (cardiopulmonary complications), MI, CVA, DVT,pain, bleeding, infection, injury to local viscera, wound problems, conversion to open procedure if laparoscopic procedure planned, and incomplete symptom resolution. They understand and agree to proceed.   DC home later today if tolerates diet    Electronically signed by:  Joceline Alicea DO  3/13/2023

## 2023-03-13 NOTE — PROGRESS NOTES
END OF SHIFT NOTE:    INTAKE/OUTPUT  03/12 0701 - 03/13 0700  In: -   Out: 200 [Urine:200]  Voiding: Yes  Catheter: No  Drain:              Flatus: Patient does have flatus present. Stool:  occurrences. Characteristics:                Emesis:  occurrences. Characteristics:        VITAL SIGNS  Patient Vitals for the past 12 hrs:   Temp Pulse Resp BP SpO2   03/13/23 1625 -- -- -- (!) 192/107 --   03/13/23 1509 97.9 °F (36.6 °C) 63 18 (!) 197/107 97 %   03/13/23 1450 -- 69 16 (!) 162/85 93 %   03/13/23 1445 -- 68 16 (!) 167/84 94 %   03/13/23 1440 -- 65 16 (!) 172/84 93 %   03/13/23 1435 98 °F (36.7 °C) 68 16 (!) 169/94 93 %   03/13/23 1430 -- 68 16 (!) 168/91 93 %   03/13/23 1425 -- 68 16 (!) 182/99 91 %   03/13/23 1420 -- 65 16 (!) 174/89 94 %   03/13/23 1415 -- 62 16 (!) 189/89 93 %   03/13/23 1412 -- 61 16 (!) 188/99 91 %   03/13/23 1410 -- 63 16 (!) 194/104 91 %   03/13/23 1405 -- 63 16 (!) 206/100 94 %   03/13/23 1400 -- 62 16 (!) 198/101 95 %   03/13/23 1355 -- 62 15 (!) 201/98 92 %   03/13/23 1350 -- 63 15 (!) 206/103 92 %   03/13/23 1345 -- 64 15 (!) 187/99 98 %   03/13/23 1344 98 °F (36.7 °C) 67 14 (!) 203/101 100 %   03/13/23 1124 -- -- -- (!) 208/107 --   03/13/23 1054 (!) 100.7 °F (38.2 °C) 64 16 (!) 216/111 98 %   03/13/23 0713 98.1 °F (36.7 °C) 72 18 (!) 163/100 97 %       Pain Assessment  Pain Level: 6 (03/13/23 1628)  Pain Location: Abdomen  Patient's Stated Pain Goal: 3    Ambulating  Yes    Shift report given to oncoming nurse at the bedside.     Amanda Samayoa RN

## 2023-03-14 VITALS
OXYGEN SATURATION: 98 % | BODY MASS INDEX: 21.97 KG/M2 | SYSTOLIC BLOOD PRESSURE: 137 MMHG | HEIGHT: 67 IN | TEMPERATURE: 98.4 F | HEART RATE: 86 BPM | WEIGHT: 140 LBS | RESPIRATION RATE: 16 BRPM | DIASTOLIC BLOOD PRESSURE: 81 MMHG

## 2023-03-14 DIAGNOSIS — M62.89: Primary | ICD-10-CM

## 2023-03-14 LAB
BASOPHILS # BLD: 0 K/UL (ref 0–0.2)
BASOPHILS NFR BLD: 0 % (ref 0–2)
DIFFERENTIAL METHOD BLD: ABNORMAL
EOSINOPHIL # BLD: 0 K/UL (ref 0–0.8)
EOSINOPHIL NFR BLD: 0 % (ref 0.5–7.8)
ERYTHROCYTE [DISTWIDTH] IN BLOOD BY AUTOMATED COUNT: 13.7 % (ref 11.9–14.6)
HCT VFR BLD AUTO: 27.6 % (ref 35.8–46.3)
HGB BLD-MCNC: 9.2 G/DL (ref 11.7–15.4)
IMM GRANULOCYTES # BLD AUTO: 0 K/UL (ref 0–0.5)
IMM GRANULOCYTES NFR BLD AUTO: 1 % (ref 0–5)
LYMPHOCYTES # BLD: 0.6 K/UL (ref 0.5–4.6)
LYMPHOCYTES NFR BLD: 7 % (ref 13–44)
MCH RBC QN AUTO: 32.4 PG (ref 26.1–32.9)
MCHC RBC AUTO-ENTMCNC: 33.3 G/DL (ref 31.4–35)
MCV RBC AUTO: 97.2 FL (ref 82–102)
MONOCYTES # BLD: 0.9 K/UL (ref 0.1–1.3)
MONOCYTES NFR BLD: 11 % (ref 4–12)
NEUTS SEG # BLD: 7 K/UL (ref 1.7–8.2)
NEUTS SEG NFR BLD: 82 % (ref 43–78)
NRBC # BLD: 0 K/UL (ref 0–0.2)
PLATELET # BLD AUTO: 106 K/UL (ref 150–450)
PMV BLD AUTO: 10.9 FL (ref 9.4–12.3)
RBC # BLD AUTO: 2.84 M/UL (ref 4.05–5.2)
WBC # BLD AUTO: 8.5 K/UL (ref 4.3–11.1)

## 2023-03-14 PROCEDURE — 85025 COMPLETE CBC W/AUTO DIFF WBC: CPT

## 2023-03-14 PROCEDURE — 6370000000 HC RX 637 (ALT 250 FOR IP): Performed by: NURSE PRACTITIONER

## 2023-03-14 PROCEDURE — 36415 COLL VENOUS BLD VENIPUNCTURE: CPT

## 2023-03-14 PROCEDURE — 2580000003 HC RX 258: Performed by: NURSE PRACTITIONER

## 2023-03-14 PROCEDURE — G0378 HOSPITAL OBSERVATION PER HR: HCPCS

## 2023-03-14 PROCEDURE — 6360000002 HC RX W HCPCS: Performed by: NURSE PRACTITIONER

## 2023-03-14 RX ORDER — DOCUSATE SODIUM 100 MG/1
100 CAPSULE, LIQUID FILLED ORAL 2 TIMES DAILY
Qty: 30 CAPSULE | Refills: 0 | Status: SHIPPED | OUTPATIENT
Start: 2023-03-14 | End: 2023-03-24

## 2023-03-14 RX ORDER — OXYCODONE HYDROCHLORIDE AND ACETAMINOPHEN 5; 325 MG/1; MG/1
1 TABLET ORAL EVERY 4 HOURS PRN
Qty: 20 TABLET | Refills: 0 | Status: SHIPPED | OUTPATIENT
Start: 2023-03-14 | End: 2023-03-19

## 2023-03-14 RX ORDER — METHOCARBAMOL 750 MG/1
750 TABLET, FILM COATED ORAL 4 TIMES DAILY
Qty: 40 TABLET | Refills: 0 | Status: SHIPPED | OUTPATIENT
Start: 2023-03-14 | End: 2023-03-24

## 2023-03-14 RX ADMIN — OXYCODONE AND ACETAMINOPHEN 1 TABLET: 7.5; 325 TABLET ORAL at 03:42

## 2023-03-14 RX ADMIN — HYDRALAZINE HYDROCHLORIDE 10 MG: 20 INJECTION INTRAMUSCULAR; INTRAVENOUS at 03:56

## 2023-03-14 RX ADMIN — SODIUM CHLORIDE, PRESERVATIVE FREE 10 ML: 5 INJECTION INTRAVENOUS at 08:35

## 2023-03-14 RX ADMIN — AMLODIPINE BESYLATE 10 MG: 10 TABLET ORAL at 08:34

## 2023-03-14 RX ADMIN — LEVETIRACETAM 750 MG: 500 TABLET, FILM COATED ORAL at 08:34

## 2023-03-14 RX ADMIN — LOSARTAN POTASSIUM 100 MG: 50 TABLET, FILM COATED ORAL at 08:34

## 2023-03-14 RX ADMIN — HYDRALAZINE HYDROCHLORIDE 50 MG: 50 TABLET, FILM COATED ORAL at 05:49

## 2023-03-14 RX ADMIN — SPIRONOLACTONE 50 MG: 25 TABLET ORAL at 08:34

## 2023-03-14 RX ADMIN — SODIUM CHLORIDE: 9 INJECTION, SOLUTION INTRAVENOUS at 03:42

## 2023-03-14 ASSESSMENT — PAIN DESCRIPTION - LOCATION: LOCATION: ABDOMEN

## 2023-03-14 ASSESSMENT — PAIN DESCRIPTION - ORIENTATION: ORIENTATION: ANTERIOR

## 2023-03-14 ASSESSMENT — PAIN SCALES - GENERAL
PAINLEVEL_OUTOF10: 2
PAINLEVEL_OUTOF10: 6
PAINLEVEL_OUTOF10: 0

## 2023-03-14 ASSESSMENT — PAIN DESCRIPTION - DESCRIPTORS: DESCRIPTORS: STABBING;SQUEEZING

## 2023-03-14 ASSESSMENT — PAIN DESCRIPTION - PAIN TYPE: TYPE: SURGICAL PAIN

## 2023-03-14 ASSESSMENT — PAIN DESCRIPTION - FREQUENCY: FREQUENCY: INTERMITTENT

## 2023-03-14 NOTE — CARE COORDINATION
Discharge order is in. Pt is discharging home today in stable condition. No discharge needs were identified. Tx goals have been met.       03/14/23 0982   Services At/After Discharge   Transition of Care Consult (CM Consult) Discharge Swati 1690 Discharge None   [facility] Information Provided? No   Mode of Transport at Discharge BLS  (Family)   Confirm Follow Up Transport Family   Condition of Participation: Discharge Planning   The Patient and/or Patient Representative was provided with a Choice of Provider? Patient   The Patient and/Or Patient Representative agree with the Discharge Plan? Yes   Freedom of Choice list was provided with basic dialogue that supports the patient's individualized plan of care/goals, treatment preferences, and shares the quality data associated with the providers?   Yes

## 2023-03-14 NOTE — PROGRESS NOTES
END OF SHIFT NOTE:    INTAKE/OUTPUT  03/13 0701 - 03/14 0700  In: 8365 [I.V.:2313]  Out: 750 [Urine:650]  Voiding: Yes  Catheter: No  Drain:              Flatus: Patient does have flatus present. Stool:  occurrences. Characteristics:                Emesis:  occurrences. Characteristics:        VITAL SIGNS  Patient Vitals for the past 12 hrs:   Temp Pulse Resp BP SpO2   03/14/23 0549 -- 88 -- 135/79 --   03/14/23 0351 97.5 °F (36.4 °C) 78 19 (!) 179/100 98 %   03/13/23 2311 99.1 °F (37.3 °C) 81 18 (!) 150/97 94 %   03/13/23 2103 -- -- -- (!) 160/108 --   03/13/23 1945 97.9 °F (36.6 °C) 80 17 (!) 155/111 97 %       Pain Assessment  Pain Level: 2 (03/14/23 0442)  Pain Location: Abdomen  Patient's Stated Pain Goal: 2    Ambulating  Yes    Shift report given to oncoming nurse at the bedside.     Dillon William RN

## 2023-03-14 NOTE — PROGRESS NOTES
Pt's D/C instructions completed.  Verbalized understanding of all instructions including diet, activity, s/sx to alert MD, medications, wound care, and f/u appointment.  Family at BS.

## 2023-03-14 NOTE — PROGRESS NOTES
Admit Date: 3/12/2023    POD 1 Day Post-Op    Procedure:  Procedure(s): HERNIA VENTRAL REPAIR LAPAROSCOPIC ROBOTIC    Subjective:     Patient alert and oriented x4 with NAD noted. Respirations even and unlabored on room air. Abdominal pain is well controlled with as needed p.o. pain medication. Endorses flatus. Tolerating regular diet with no nausea or vomiting. Mobility at baseline PTA    Objective:       Vitals:    23 2311 23 0351 23 0549 23 0707   BP: (!) 150/97 (!) 179/100 135/79 134/77   Pulse: 81 78 88 80   Resp: 18 19  18   Temp: 99.1 °F (37.3 °C) 97.5 °F (36.4 °C)  98.6 °F (37 °C)   TempSrc: Oral Oral  Oral   SpO2: 94% 98%  98%   Weight:       Height:           Temp (24hrs), Av.5 °F (36.9 °C), Min:97.5 °F (36.4 °C), Max:100.7 °F (38.2 °C)  . I&O reviewed as documented. Voiding  Positive flatus 3/14/2023  Afebrile  Hypertension 160s to 190s/80s to 100s; otherwise, VSS    Physical Exam  Constitutional:       General: She is not in acute distress. Appearance: Normal appearance. HENT:      Mouth/Throat:      Mouth: Mucous membranes are moist.   Cardiovascular:      Rate and Rhythm: Normal rate and regular rhythm. Pulses: Normal pulses. Heart sounds: Normal heart sounds. No murmur heard. No friction rub. No gallop. Pulmonary:      Effort: Pulmonary effort is normal. No respiratory distress. Breath sounds: Normal breath sounds. Abdominal:      General: Bowel sounds are normal. There is no distension. Palpations: Abdomen is soft. Genitourinary:     Comments: Voiding   Musculoskeletal:         General: No swelling. Normal range of motion. Cervical back: No rigidity. Skin:     General: Skin is warm and dry. Capillary Refill: Capillary refill takes less than 2 seconds.       Comments: Abdominal trocar sites are clean dry and intact with no signs of infection-Steri-Strips in place   Neurological:      Mental Status: She is alert and oriented to person, place, and time. Psychiatric:         Behavior: Behavior normal.          Labs:   Recent Results (from the past 24 hour(s))   CBC with Auto Differential    Collection Time: 03/14/23  4:32 AM   Result Value Ref Range    WBC 8.5 4.3 - 11.1 K/uL    RBC 2.84 (L) 4.05 - 5.2 M/uL    Hemoglobin 9.2 (L) 11.7 - 15.4 g/dL    Hematocrit 27.6 (L) 35.8 - 46.3 %    MCV 97.2 82 - 102 FL    MCH 32.4 26.1 - 32.9 PG    MCHC 33.3 31.4 - 35.0 g/dL    RDW 13.7 11.9 - 14.6 %    Platelets 616 (L) 428 - 450 K/uL    MPV 10.9 9.4 - 12.3 FL    nRBC 0.00 0.0 - 0.2 K/uL    Differential Type AUTOMATED      Seg Neutrophils 82 (H) 43 - 78 %    Lymphocytes 7 (L) 13 - 44 %    Monocytes 11 4.0 - 12.0 %    Eosinophils % 0 (L) 0.5 - 7.8 %    Basophils 0 0.0 - 2.0 %    Immature Granulocytes 1 0.0 - 5.0 %    Segs Absolute 7.0 1.7 - 8.2 K/UL    Absolute Lymph # 0.6 0.5 - 4.6 K/UL    Absolute Mono # 0.9 0.1 - 1.3 K/UL    Absolute Eos # 0.0 0.0 - 0.8 K/UL    Basophils Absolute 0.0 0.0 - 0.2 K/UL    Absolute Immature Granulocyte 0.0 0.0 - 0.5 K/UL         Assessment:     Principal Problem:    Interparietal hernia  Resolved Problems:    * No resolved hospital problems. *        Plan/Recommendations/Medical Decision Making:   Discharge patient today 3/14/2023  Surgical follow-up information and discharge instructions have been added to S  DISCHARGE INSTRUCTIONS    Please call our office for a follow-up appointment in 1-2 week(s). Driving: No driving while taking pain medications    Activity: No strenous activity. Slowly advance as tolerated. No lifting greater than 20lbs for 3 weeks after surgery. Diet:  Slowly advance as tolerated. Increase your fluids and fiber, as pain medications may cause constipation. No alcoholic beverages. Bathing: You may shower. No tub baths for 5 days. Dressing: If outer dressing, remove in 24 hours. Leave steri strips intact for 1 week and then remove.     Other:  Ice to incision as needed for pain. If drains present, empty and record    output daily. Call Dr. Vadim Mensah if you have:  ? Temperature greater than 100.4  ? Persistent nausea and vomiting  ? Severe uncontrolled pain  ? Redness, tenderness, or signs of infection (pain, swelling, redness, odor or green/yellow discharge around the site)  ? Difficulty breathing, headache or visual disturbances  ? Hives  ? Persistent dizziness or light-headedness  ? Extreme fatigue  ? Any other questions or concerns you may have after discharge    In an emergency, call 911 or go to an Emergency Department at [facility] or [facility].    It is important to bring a complete, current list of your medications to any medical appointments or hospitalizations. Do not get constipated! No more than 2 days without a bm. If 2 days no bm, then take colace stool softener twice a day. If 24 hours of colace does not produce a bm , then keep taking colace and add miralax laxative twice a day until you have a bm. Once you are having regular bms, you can use colace and miralax as needed. If problems (fever, signs of infection, uncontrolled bleeding or drainage from surgical incision, uncontrolled pain, uncontrolled nausea and/or vomiting) or any surgical questions arise, please call our office at [phone]. [address] [facility] Office     NYA Gomez NP

## 2023-03-14 NOTE — ANESTHESIA POSTPROCEDURE EVALUATION
Department of Anesthesiology  Postprocedure Note    Patient: Juliann Pompa  MRN: [id number]  YOB: 1960  Date of evaluation: 3/13/2023      Procedure Summary     Date: 03/13/23 Room / Location: [facility] MAIN OR 09 / [facility] MAIN OR    Anesthesia Start: 1152 Anesthesia Stop: 9955    Procedure: HERNIA VENTRAL REPAIR LAPAROSCOPIC ROBOTIC (Abdomen) Diagnosis:       Ventral hernia without obstruction or gangrene      (Ventral hernia without obstruction or gangrene [K43.9])    Providers: Adilene Ordonez DO Responsible Provider: Tricia Linder MD    Anesthesia Type: general ASA Status: 3          Anesthesia Type: No value filed.     José Miguel Phase I: José Miguel Score: 10    José Miguel Phase II:        Anesthesia Post Evaluation    Patient location during evaluation: bedside  Patient participation: complete - patient participated  Level of consciousness: awake and alert  Pain score: 1  Airway patency: patent  Nausea & Vomiting: no vomiting  Complications: no  Cardiovascular status: hemodynamically stable  Respiratory status: acceptable  Hydration status: euvolemic

## 2023-03-14 NOTE — PLAN OF CARE
Problem: Discharge Planning  Goal: Discharge to home or other facility with appropriate resources  Outcome: Progressing     Problem: Pain  Goal: Verbalizes/displays adequate comfort level or baseline comfort level  Outcome: Progressing     Problem: Safety - Adult  Goal: Free from fall injury  Outcome: Progressing     Problem: ABCDS Injury Assessment  Goal: Absence of physical injury  Outcome: Progressing     Problem: Skin/Tissue Integrity  Goal: Absence of new skin breakdown  Description: 1.  Monitor for areas of redness and/or skin breakdown  2.  Assess vascular access sites hourly  3.  Every 4-6 hours minimum:  Change oxygen saturation probe site  4.  Every 4-6 hours:  If on nasal continuous positive airway pressure, respiratory therapy assess nares and determine need for appliance change or resting period.  Outcome: Progressing

## 2023-03-29 ENCOUNTER — OFFICE VISIT (OUTPATIENT)
Dept: SURGERY | Age: 63
End: 2023-03-29

## 2023-03-29 DIAGNOSIS — Z09 POSTOPERATIVE EXAMINATION: Primary | ICD-10-CM

## 2023-03-29 DIAGNOSIS — R10.84 GENERALIZED ABDOMINAL PAIN: ICD-10-CM

## 2023-03-29 PROCEDURE — 99024 POSTOP FOLLOW-UP VISIT: CPT | Performed by: STUDENT IN AN ORGANIZED HEALTH CARE EDUCATION/TRAINING PROGRAM

## 2023-03-29 NOTE — PROGRESS NOTES
Allergies: Allergies   Allergen Reactions    Lisinopril Angioedema        Past History:  Past Medical History:   Diagnosis Date    Hypertension     Infectious disease     hep c      Past Surgical History:   Procedure Laterality Date    HERNIA REPAIR N/A 3/13/2023    HERNIA VENTRAL REPAIR LAPAROSCOPIC ROBOTIC performed by Renee Jarrett DO at [facility] MAIN OR    ORTHOPEDIC SURGERY      evie in right leg    UPPER GASTROINTESTINAL ENDOSCOPY N/A 10/28/2022    EGD BIOPSY performed by Addy Gomez MD at [facility] ENDOSCOPY        Family and Social History:  Family History   Problem Relation Age of Onset    Lung Disease Mother      Social History     Socioeconomic History    Marital status: Single     Spouse name: Not on file    Number of children: Not on file    Years of education: Not on file    Highest education level: Not on file   Occupational History    Not on file   Tobacco Use    Smoking status: Every Day     Packs/day: 2.00     Types: Cigarettes    Smokeless tobacco: Current   Substance and Sexual Activity    Alcohol use: Yes    Drug use: No    Sexual activity: Not on file   Other Topics Concern    Not on file   Social History Narrative    Not on file     Social Determinants of Health     Financial Resource Strain: Not on file   Food Insecurity: Not on file   Transportation Needs: Not on file   Physical Activity: Not on file   Stress: Not on file   Social Connections: Not on file   Intimate Partner Violence: Not on file   Housing Stability: Not on file        REVIEW OF SYSTEMS: 10 Point ROS negative except what is in HPI    PHYSICAL EXAMINATION:    There were no vitals taken for this visit. General Appearance AOx3, in no acute distress  Respiratory No chest wall deformities or tenderness, respiratory effort normal, no use of accessory muscles. Cardiovascular RRR. No chest wall tenderness. Gastrointestinal Abdomen soft, nontender, nondistended. BS x4. No rebound, guarding, or rigidity present.  No

## 2023-04-21 ENCOUNTER — HOSPITAL ENCOUNTER (OUTPATIENT)
Dept: CT IMAGING | Age: 63
End: 2023-04-21
Payer: MEDICAID

## 2023-04-21 DIAGNOSIS — R10.84 GENERALIZED ABDOMINAL PAIN: ICD-10-CM

## 2023-04-21 LAB — CREAT BLD-MCNC: 1.2 MG/DL (ref 0.8–1.5)

## 2023-04-21 PROCEDURE — 6360000004 HC RX CONTRAST MEDICATION: Performed by: STUDENT IN AN ORGANIZED HEALTH CARE EDUCATION/TRAINING PROGRAM

## 2023-04-21 PROCEDURE — 74177 CT ABD & PELVIS W/CONTRAST: CPT

## 2023-04-21 PROCEDURE — 82565 ASSAY OF CREATININE: CPT

## 2023-04-21 RX ORDER — 0.9 % SODIUM CHLORIDE 0.9 %
100 INTRAVENOUS SOLUTION INTRAVENOUS
Status: DISCONTINUED | OUTPATIENT
Start: 2023-04-21 | End: 2023-04-25 | Stop reason: HOSPADM

## 2023-04-21 RX ORDER — SODIUM CHLORIDE 0.9 % (FLUSH) 0.9 %
10 SYRINGE (ML) INJECTION
Status: DISCONTINUED | OUTPATIENT
Start: 2023-04-21 | End: 2023-04-25 | Stop reason: HOSPADM

## 2023-04-21 RX ADMIN — DIATRIZOATE MEGLUMINE AND DIATRIZOATE SODIUM 15 ML: 660; 100 LIQUID ORAL; RECTAL at 11:19

## 2023-04-21 RX ADMIN — IOPAMIDOL 100 ML: 755 INJECTION, SOLUTION INTRAVENOUS at 11:19

## 2024-10-09 ENCOUNTER — HOSPITAL ENCOUNTER (INPATIENT)
Age: 64
LOS: 6 days | Discharge: HOME HEALTH | DRG: 199 | End: 2024-10-16
Attending: EMERGENCY MEDICINE
Payer: MEDICAID

## 2024-10-09 ENCOUNTER — APPOINTMENT (OUTPATIENT)
Dept: CT IMAGING | Age: 64
DRG: 199 | End: 2024-10-09
Payer: MEDICAID

## 2024-10-09 DIAGNOSIS — K92.1 GASTROINTESTINAL HEMORRHAGE WITH MELENA: ICD-10-CM

## 2024-10-09 DIAGNOSIS — K92.1 MELENA: ICD-10-CM

## 2024-10-09 DIAGNOSIS — D62 ANEMIA DUE TO ACUTE BLOOD LOSS: ICD-10-CM

## 2024-10-09 DIAGNOSIS — E72.20 HYPERAMMONEMIA (HCC): ICD-10-CM

## 2024-10-09 DIAGNOSIS — F10.20 UNCOMPLICATED ALCOHOL DEPENDENCE (HCC): ICD-10-CM

## 2024-10-09 DIAGNOSIS — I63.9 CEREBROVASCULAR ACCIDENT (CVA), UNSPECIFIED MECHANISM (HCC): ICD-10-CM

## 2024-10-09 DIAGNOSIS — K70.30 ALCOHOLIC CIRRHOSIS, UNSPECIFIED WHETHER ASCITES PRESENT (HCC): ICD-10-CM

## 2024-10-09 DIAGNOSIS — R41.82 ALTERED MENTAL STATUS, UNSPECIFIED ALTERED MENTAL STATUS TYPE: Primary | ICD-10-CM

## 2024-10-09 LAB
BASOPHILS # BLD: 0 K/UL (ref 0–0.2)
BASOPHILS NFR BLD: 0 % (ref 0–2)
DIFFERENTIAL METHOD BLD: ABNORMAL
EOSINOPHIL # BLD: 0.1 K/UL (ref 0–0.8)
EOSINOPHIL NFR BLD: 2 % (ref 0.5–7.8)
ERYTHROCYTE [DISTWIDTH] IN BLOOD BY AUTOMATED COUNT: 16.9 % (ref 11.9–14.6)
HCT VFR BLD AUTO: 26.4 % (ref 35.8–46.3)
HGB BLD-MCNC: 8.2 G/DL (ref 11.7–15.4)
IMM GRANULOCYTES # BLD AUTO: 0 K/UL (ref 0–0.5)
IMM GRANULOCYTES NFR BLD AUTO: 0 % (ref 0–5)
LACTATE SERPL-SCNC: 1.5 MMOL/L (ref 0.5–2)
LYMPHOCYTES # BLD: 0.7 K/UL (ref 0.5–4.6)
LYMPHOCYTES NFR BLD: 15 % (ref 13–44)
MCH RBC QN AUTO: 28.2 PG (ref 26.1–32.9)
MCHC RBC AUTO-ENTMCNC: 31.1 G/DL (ref 31.4–35)
MCV RBC AUTO: 90.7 FL (ref 82–102)
MONOCYTES # BLD: 0.5 K/UL (ref 0.1–1.3)
MONOCYTES NFR BLD: 10 % (ref 4–12)
NEUTS SEG # BLD: 3.5 K/UL (ref 1.7–8.2)
NEUTS SEG NFR BLD: 73 % (ref 43–78)
NRBC # BLD: 0 K/UL (ref 0–0.2)
PLATELET # BLD AUTO: 131 K/UL (ref 150–450)
PMV BLD AUTO: 10.3 FL (ref 9.4–12.3)
RBC # BLD AUTO: 2.91 M/UL (ref 4.05–5.2)
WBC # BLD AUTO: 4.8 K/UL (ref 4.3–11.1)

## 2024-10-09 PROCEDURE — 85025 COMPLETE CBC W/AUTO DIFF WBC: CPT

## 2024-10-09 PROCEDURE — 96375 TX/PRO/DX INJ NEW DRUG ADDON: CPT

## 2024-10-09 PROCEDURE — 82550 ASSAY OF CK (CPK): CPT

## 2024-10-09 PROCEDURE — 83605 ASSAY OF LACTIC ACID: CPT

## 2024-10-09 PROCEDURE — 84484 ASSAY OF TROPONIN QUANT: CPT

## 2024-10-09 PROCEDURE — 82140 ASSAY OF AMMONIA: CPT

## 2024-10-09 PROCEDURE — 83735 ASSAY OF MAGNESIUM: CPT

## 2024-10-09 PROCEDURE — 82077 ASSAY SPEC XCP UR&BREATH IA: CPT

## 2024-10-09 PROCEDURE — 6360000002 HC RX W HCPCS: Performed by: EMERGENCY MEDICINE

## 2024-10-09 PROCEDURE — 99285 EMERGENCY DEPT VISIT HI MDM: CPT

## 2024-10-09 PROCEDURE — 70450 CT HEAD/BRAIN W/O DYE: CPT

## 2024-10-09 PROCEDURE — 80053 COMPREHEN METABOLIC PANEL: CPT

## 2024-10-09 PROCEDURE — 84443 ASSAY THYROID STIM HORMONE: CPT

## 2024-10-09 PROCEDURE — 93005 ELECTROCARDIOGRAM TRACING: CPT | Performed by: EMERGENCY MEDICINE

## 2024-10-09 RX ORDER — LABETALOL HYDROCHLORIDE 5 MG/ML
20 INJECTION, SOLUTION INTRAVENOUS
Status: COMPLETED | OUTPATIENT
Start: 2024-10-09 | End: 2024-10-09

## 2024-10-09 RX ADMIN — LABETALOL HYDROCHLORIDE 20 MG: 5 INJECTION INTRAVENOUS at 23:25

## 2024-10-09 ASSESSMENT — PAIN SCALES - GENERAL: PAINLEVEL_OUTOF10: 5

## 2024-10-09 ASSESSMENT — PAIN DESCRIPTION - LOCATION: LOCATION: CHEST

## 2024-10-09 ASSESSMENT — PAIN - FUNCTIONAL ASSESSMENT: PAIN_FUNCTIONAL_ASSESSMENT: 0-10

## 2024-10-10 ENCOUNTER — APPOINTMENT (OUTPATIENT)
Dept: CT IMAGING | Age: 64
DRG: 199 | End: 2024-10-10
Payer: MEDICAID

## 2024-10-10 ENCOUNTER — ANESTHESIA EVENT (OUTPATIENT)
Dept: ENDOSCOPY | Age: 64
End: 2024-10-10
Payer: MEDICAID

## 2024-10-10 ENCOUNTER — APPOINTMENT (OUTPATIENT)
Dept: NON INVASIVE DIAGNOSTICS | Age: 64
DRG: 199 | End: 2024-10-10
Payer: MEDICAID

## 2024-10-10 ENCOUNTER — APPOINTMENT (OUTPATIENT)
Dept: MRI IMAGING | Age: 64
DRG: 199 | End: 2024-10-10
Payer: MEDICAID

## 2024-10-10 PROBLEM — I85.00 VARICES, ESOPHAGEAL (HCC): Status: ACTIVE | Noted: 2024-10-09

## 2024-10-10 PROBLEM — K92.1 MELENA: Status: ACTIVE | Noted: 2024-10-09

## 2024-10-10 PROBLEM — Z72.0 TOBACCO ABUSE: Status: ACTIVE | Noted: 2024-10-10

## 2024-10-10 PROBLEM — R41.82 ALTERED MENTAL STATUS: Status: ACTIVE | Noted: 2024-10-10

## 2024-10-10 PROBLEM — F10.90 ALCOHOL USE DISORDER: Status: ACTIVE | Noted: 2024-10-10

## 2024-10-10 PROBLEM — D50.0 IRON DEFICIENCY ANEMIA DUE TO CHRONIC BLOOD LOSS: Status: ACTIVE | Noted: 2024-10-10

## 2024-10-10 PROBLEM — K92.2 UGI BLEED: Status: ACTIVE | Noted: 2024-10-10

## 2024-10-10 PROBLEM — K76.82 HEPATIC ENCEPHALOPATHY (HCC): Status: ACTIVE | Noted: 2024-10-10

## 2024-10-10 PROBLEM — Z87.19 HX OF ESOPHAGEAL VARICES: Status: ACTIVE | Noted: 2024-10-10

## 2024-10-10 LAB
ABO + RH BLD: NORMAL
ALBUMIN SERPL-MCNC: 3.2 G/DL (ref 3.2–4.6)
ALBUMIN/GLOB SERPL: 0.6 (ref 1–1.9)
ALP SERPL-CCNC: 145 U/L (ref 35–104)
ALT SERPL-CCNC: 17 U/L (ref 8–45)
AMMONIA PLAS-SCNC: 62 UMOL/L (ref 11–51)
AMPHET UR QL SCN: NEGATIVE
ANION GAP SERPL CALC-SCNC: 13 MMOL/L (ref 9–18)
APPEARANCE UR: CLEAR
APTT PPP: 33.3 SEC (ref 23.3–37.4)
AST SERPL-CCNC: 35 U/L (ref 15–37)
BACTERIA URNS QL MICRO: ABNORMAL /HPF
BARBITURATES UR QL SCN: NEGATIVE
BENZODIAZ UR QL: NEGATIVE
BILIRUB SERPL-MCNC: 1.1 MG/DL (ref 0–1.2)
BILIRUB UR QL: NEGATIVE
BLOOD GROUP ANTIBODIES SERPL: NORMAL
BUN SERPL-MCNC: 26 MG/DL (ref 8–23)
CALCIUM SERPL-MCNC: 10.1 MG/DL (ref 8.8–10.2)
CANNABINOIDS UR QL SCN: NEGATIVE
CHLORIDE SERPL-SCNC: 108 MMOL/L (ref 98–107)
CHOLEST SERPL-MCNC: 122 MG/DL (ref 0–200)
CK SERPL-CCNC: 149 U/L (ref 21–215)
CO2 SERPL-SCNC: 18 MMOL/L (ref 20–28)
COCAINE UR QL SCN: NEGATIVE
COLOR UR: ABNORMAL
CREAT SERPL-MCNC: 1.26 MG/DL (ref 0.6–1.1)
ECHO AO ASC DIAM: 3.1 CM
ECHO AO ASCENDING AORTA INDEX: 1.62 CM/M2
ECHO AO ROOT DIAM: 3.2 CM
ECHO AO ROOT INDEX: 1.68 CM/M2
ECHO AV AREA PEAK VELOCITY: 2.6 CM2
ECHO AV AREA VTI: 3 CM2
ECHO AV AREA/BSA PEAK VELOCITY: 1.4 CM2/M2
ECHO AV AREA/BSA VTI: 1.6 CM2/M2
ECHO AV MEAN GRADIENT: 5 MMHG
ECHO AV MEAN VELOCITY: 1.1 M/S
ECHO AV PEAK GRADIENT: 10 MMHG
ECHO AV PEAK VELOCITY: 1.6 M/S
ECHO AV VELOCITY RATIO: 0.81
ECHO AV VTI: 39.6 CM
ECHO BSA: 1.96 M2
ECHO IVC PROX: 1.4 CM
ECHO LA AREA 2C: 21.2 CM2
ECHO LA AREA 4C: 21 CM2
ECHO LA DIAMETER INDEX: 2.09 CM/M2
ECHO LA DIAMETER: 4 CM
ECHO LA MAJOR AXIS: 5.4 CM
ECHO LA MINOR AXIS: 5.9 CM
ECHO LA TO AORTIC ROOT RATIO: 1.25
ECHO LA VOL BP: 64 ML (ref 22–52)
ECHO LA VOL MOD A2C: 61 ML (ref 22–52)
ECHO LA VOL MOD A4C: 63 ML (ref 22–52)
ECHO LA VOL/BSA BIPLANE: 34 ML/M2 (ref 16–34)
ECHO LA VOLUME INDEX MOD A2C: 32 ML/M2 (ref 16–34)
ECHO LA VOLUME INDEX MOD A4C: 33 ML/M2 (ref 16–34)
ECHO LV E' LATERAL VELOCITY: 6.4 CM/S
ECHO LV E' SEPTAL VELOCITY: 6.5 CM/S
ECHO LV EDV A2C: 90 ML
ECHO LV EDV A4C: 92 ML
ECHO LV EDV INDEX A4C: 48 ML/M2
ECHO LV EDV NDEX A2C: 47 ML/M2
ECHO LV EJECTION FRACTION A2C: 65 %
ECHO LV EJECTION FRACTION A4C: 59 %
ECHO LV EJECTION FRACTION BIPLANE: 61 % (ref 55–100)
ECHO LV ESV A2C: 31 ML
ECHO LV ESV A4C: 38 ML
ECHO LV ESV INDEX A2C: 16 ML/M2
ECHO LV ESV INDEX A4C: 20 ML/M2
ECHO LV FRACTIONAL SHORTENING: 33 % (ref 28–44)
ECHO LV INTERNAL DIMENSION DIASTOLE INDEX: 2.04 CM/M2
ECHO LV INTERNAL DIMENSION DIASTOLIC: 3.9 CM (ref 3.9–5.3)
ECHO LV INTERNAL DIMENSION SYSTOLIC INDEX: 1.36 CM/M2
ECHO LV INTERNAL DIMENSION SYSTOLIC: 2.6 CM
ECHO LV IVSD: 1.7 CM (ref 0.6–0.9)
ECHO LV MASS 2D: 212.9 G (ref 67–162)
ECHO LV MASS INDEX 2D: 111.4 G/M2 (ref 43–95)
ECHO LV POSTERIOR WALL DIASTOLIC: 1.2 CM (ref 0.6–0.9)
ECHO LV RELATIVE WALL THICKNESS RATIO: 0.62
ECHO LVOT AREA: 3.1 CM2
ECHO LVOT AV VTI INDEX: 0.94
ECHO LVOT DIAM: 2 CM
ECHO LVOT MEAN GRADIENT: 4 MMHG
ECHO LVOT PEAK GRADIENT: 7 MMHG
ECHO LVOT PEAK VELOCITY: 1.3 M/S
ECHO LVOT STROKE VOLUME INDEX: 61.2 ML/M2
ECHO LVOT SV: 116.8 ML
ECHO LVOT VTI: 37.2 CM
ECHO MV A VELOCITY: 1.68 M/S
ECHO MV AREA VTI: 4.4 CM2
ECHO MV E DECELERATION TIME (DT): 119 MS
ECHO MV E VELOCITY: 1.18 M/S
ECHO MV E/A RATIO: 0.7
ECHO MV E/E' LATERAL: 18.44
ECHO MV E/E' RATIO (AVERAGED): 18.3
ECHO MV E/E' SEPTAL: 18.15
ECHO MV LVOT VTI INDEX: 0.71
ECHO MV MAX VELOCITY: 1.7 M/S
ECHO MV MEAN GRADIENT: 4 MMHG
ECHO MV MEAN VELOCITY: 1 M/S
ECHO MV PEAK GRADIENT: 12 MMHG
ECHO MV VTI: 26.5 CM
ECHO PV ACCELERATION TIME (AT): 143 MS
ECHO PV MAX VELOCITY: 1.1 M/S
ECHO PV PEAK GRADIENT: 5 MMHG
ECHO RV FREE WALL PEAK S': 14.7 CM/S
ECHO RV INTERNAL DIMENSION: 3 CM
ECHO RV TAPSE: 2.2 CM (ref 1.7–?)
EPI CELLS #/AREA URNS HPF: ABNORMAL /HPF
EST. AVERAGE GLUCOSE BLD GHB EST-MCNC: 80 MG/DL
ETHANOL SERPL-MCNC: <11 MG/DL (ref 0–0.08)
GLOBULIN SER CALC-MCNC: 5.6 G/DL (ref 2.3–3.5)
GLUCOSE SERPL-MCNC: 90 MG/DL (ref 70–99)
GLUCOSE UR STRIP.AUTO-MCNC: NEGATIVE MG/DL
HBA1C MFR BLD: 4.4 % (ref 0–5.6)
HCT VFR BLD AUTO: 23 % (ref 35.8–46.3)
HCT VFR BLD AUTO: 23.9 % (ref 35.8–46.3)
HCT VFR BLD AUTO: 26.8 % (ref 35.8–46.3)
HCT VFR BLD AUTO: 28.4 % (ref 35.8–46.3)
HDLC SERPL-MCNC: 58 MG/DL (ref 40–60)
HDLC SERPL: 2.1 (ref 0–5)
HGB BLD-MCNC: 7.2 G/DL (ref 11.7–15.4)
HGB BLD-MCNC: 7.5 G/DL (ref 11.7–15.4)
HGB BLD-MCNC: 8.3 G/DL (ref 11.7–15.4)
HGB BLD-MCNC: 8.8 G/DL (ref 11.7–15.4)
HGB UR QL STRIP: ABNORMAL
INR PPP: 1.3
IRON SERPL-MCNC: 41 UG/DL (ref 35–100)
KETONES UR QL STRIP.AUTO: ABNORMAL MG/DL
LDLC SERPL CALC-MCNC: 53 MG/DL (ref 0–100)
LEUKOCYTE ESTERASE UR QL STRIP.AUTO: NEGATIVE
MAGNESIUM SERPL-MCNC: 2.2 MG/DL (ref 1.8–2.4)
METHADONE UR QL: NEGATIVE
NITRITE UR QL STRIP.AUTO: POSITIVE
OPIATES UR QL: NEGATIVE
OTHER OBSERVATIONS: ABNORMAL
PCP UR QL: NEGATIVE
PH UR STRIP: 5.5 (ref 5–9)
POTASSIUM SERPL-SCNC: 4.1 MMOL/L (ref 3.5–5.1)
PROT SERPL-MCNC: 8.7 G/DL (ref 6.3–8.2)
PROT UR STRIP-MCNC: 100 MG/DL
PROTHROMBIN TIME: 16.7 SEC (ref 11.3–14.9)
RBC #/AREA URNS HPF: ABNORMAL /HPF
SODIUM SERPL-SCNC: 139 MMOL/L (ref 136–145)
SP GR UR REFRACTOMETRY: 1.03 (ref 1–1.02)
SPECIMEN EXP DATE BLD: NORMAL
TRANSFERRIN SERPL-MCNC: 320 MG/DL (ref 200–360)
TRIGL SERPL-MCNC: 55 MG/DL (ref 0–150)
TROPONIN T SERPL HS-MCNC: 22 NG/L (ref 0–14)
TSH, 3RD GENERATION: 3.01 UIU/ML (ref 0.27–4.2)
UROBILINOGEN UR QL STRIP.AUTO: 1 EU/DL (ref 0.2–1)
VLDLC SERPL CALC-MCNC: 11 MG/DL (ref 6–23)
WBC URNS QL MICRO: ABNORMAL /HPF

## 2024-10-10 PROCEDURE — 2580000003 HC RX 258: Performed by: FAMILY MEDICINE

## 2024-10-10 PROCEDURE — 86901 BLOOD TYPING SEROLOGIC RH(D): CPT

## 2024-10-10 PROCEDURE — 2580000003 HC RX 258

## 2024-10-10 PROCEDURE — 93306 TTE W/DOPPLER COMPLETE: CPT

## 2024-10-10 PROCEDURE — 85018 HEMOGLOBIN: CPT

## 2024-10-10 PROCEDURE — 86850 RBC ANTIBODY SCREEN: CPT

## 2024-10-10 PROCEDURE — 96375 TX/PRO/DX INJ NEW DRUG ADDON: CPT

## 2024-10-10 PROCEDURE — 83036 HEMOGLOBIN GLYCOSYLATED A1C: CPT

## 2024-10-10 PROCEDURE — 81001 URINALYSIS AUTO W/SCOPE: CPT

## 2024-10-10 PROCEDURE — 84466 ASSAY OF TRANSFERRIN: CPT

## 2024-10-10 PROCEDURE — 6360000002 HC RX W HCPCS

## 2024-10-10 PROCEDURE — 94640 AIRWAY INHALATION TREATMENT: CPT

## 2024-10-10 PROCEDURE — 6360000002 HC RX W HCPCS: Performed by: EMERGENCY MEDICINE

## 2024-10-10 PROCEDURE — 6360000002 HC RX W HCPCS: Performed by: FAMILY MEDICINE

## 2024-10-10 PROCEDURE — 93306 TTE W/DOPPLER COMPLETE: CPT | Performed by: INTERNAL MEDICINE

## 2024-10-10 PROCEDURE — 2580000003 HC RX 258: Performed by: EMERGENCY MEDICINE

## 2024-10-10 PROCEDURE — 70551 MRI BRAIN STEM W/O DYE: CPT

## 2024-10-10 PROCEDURE — 85610 PROTHROMBIN TIME: CPT

## 2024-10-10 PROCEDURE — 36415 COLL VENOUS BLD VENIPUNCTURE: CPT

## 2024-10-10 PROCEDURE — 96366 THER/PROPH/DIAG IV INF ADDON: CPT

## 2024-10-10 PROCEDURE — 96365 THER/PROPH/DIAG IV INF INIT: CPT

## 2024-10-10 PROCEDURE — 70498 CT ANGIOGRAPHY NECK: CPT

## 2024-10-10 PROCEDURE — 86900 BLOOD TYPING SEROLOGIC ABO: CPT

## 2024-10-10 PROCEDURE — 85014 HEMATOCRIT: CPT

## 2024-10-10 PROCEDURE — 94760 N-INVAS EAR/PLS OXIMETRY 1: CPT

## 2024-10-10 PROCEDURE — 76937 US GUIDE VASCULAR ACCESS: CPT

## 2024-10-10 PROCEDURE — 6370000000 HC RX 637 (ALT 250 FOR IP)

## 2024-10-10 PROCEDURE — 94664 DEMO&/EVAL PT USE INHALER: CPT

## 2024-10-10 PROCEDURE — 6360000004 HC RX CONTRAST MEDICATION: Performed by: EMERGENCY MEDICINE

## 2024-10-10 PROCEDURE — 80061 LIPID PANEL: CPT

## 2024-10-10 PROCEDURE — 80307 DRUG TEST PRSMV CHEM ANLYZR: CPT

## 2024-10-10 PROCEDURE — 2060000000 HC ICU INTERMEDIATE R&B

## 2024-10-10 PROCEDURE — 83540 ASSAY OF IRON: CPT

## 2024-10-10 PROCEDURE — 85730 THROMBOPLASTIN TIME PARTIAL: CPT

## 2024-10-10 PROCEDURE — 6370000000 HC RX 637 (ALT 250 FOR IP): Performed by: EMERGENCY MEDICINE

## 2024-10-10 PROCEDURE — 96376 TX/PRO/DX INJ SAME DRUG ADON: CPT

## 2024-10-10 RX ORDER — THIAMINE HYDROCHLORIDE 100 MG/ML
100 INJECTION, SOLUTION INTRAMUSCULAR; INTRAVENOUS DAILY
Status: DISCONTINUED | OUTPATIENT
Start: 2024-10-10 | End: 2024-10-15

## 2024-10-10 RX ORDER — BUDESONIDE 0.5 MG/2ML
0.5 INHALANT ORAL
Status: DISCONTINUED | OUTPATIENT
Start: 2024-10-10 | End: 2024-10-16 | Stop reason: HOSPADM

## 2024-10-10 RX ORDER — ONDANSETRON 4 MG/1
4 TABLET, ORALLY DISINTEGRATING ORAL EVERY 8 HOURS PRN
Status: DISCONTINUED | OUTPATIENT
Start: 2024-10-10 | End: 2024-10-16 | Stop reason: HOSPADM

## 2024-10-10 RX ORDER — LABETALOL HYDROCHLORIDE 5 MG/ML
20 INJECTION, SOLUTION INTRAVENOUS
Status: COMPLETED | OUTPATIENT
Start: 2024-10-10 | End: 2024-10-10

## 2024-10-10 RX ORDER — LACTULOSE 10 G/15ML
30 SOLUTION ORAL
Status: COMPLETED | OUTPATIENT
Start: 2024-10-10 | End: 2024-10-10

## 2024-10-10 RX ORDER — LORAZEPAM 1 MG/1
2 TABLET ORAL
Status: DISCONTINUED | OUTPATIENT
Start: 2024-10-10 | End: 2024-10-16 | Stop reason: HOSPADM

## 2024-10-10 RX ORDER — ARFORMOTEROL TARTRATE 15 UG/2ML
15 SOLUTION RESPIRATORY (INHALATION)
Status: DISCONTINUED | OUTPATIENT
Start: 2024-10-10 | End: 2024-10-16 | Stop reason: HOSPADM

## 2024-10-10 RX ORDER — SODIUM CHLORIDE 9 MG/ML
INJECTION, SOLUTION INTRAVENOUS PRN
Status: DISCONTINUED | OUTPATIENT
Start: 2024-10-10 | End: 2024-10-11

## 2024-10-10 RX ORDER — DIPHENHYDRAMINE HYDROCHLORIDE 50 MG/ML
25 INJECTION INTRAMUSCULAR; INTRAVENOUS EVERY 6 HOURS PRN
Status: DISCONTINUED | OUTPATIENT
Start: 2024-10-10 | End: 2024-10-16 | Stop reason: HOSPADM

## 2024-10-10 RX ORDER — SODIUM CHLORIDE 0.9 % (FLUSH) 0.9 %
5-40 SYRINGE (ML) INJECTION EVERY 12 HOURS SCHEDULED
Status: DISCONTINUED | OUTPATIENT
Start: 2024-10-10 | End: 2024-10-16 | Stop reason: HOSPADM

## 2024-10-10 RX ORDER — LACTULOSE 10 G/15ML
10 SOLUTION ORAL 3 TIMES DAILY
Status: DISCONTINUED | OUTPATIENT
Start: 2024-10-10 | End: 2024-10-16 | Stop reason: HOSPADM

## 2024-10-10 RX ORDER — LABETALOL HYDROCHLORIDE 5 MG/ML
10 INJECTION, SOLUTION INTRAVENOUS EVERY 4 HOURS PRN
Status: DISCONTINUED | OUTPATIENT
Start: 2024-10-10 | End: 2024-10-16 | Stop reason: HOSPADM

## 2024-10-10 RX ORDER — BUDESONIDE AND FORMOTEROL FUMARATE DIHYDRATE 80; 4.5 UG/1; UG/1
2 AEROSOL RESPIRATORY (INHALATION)
Status: DISCONTINUED | OUTPATIENT
Start: 2024-10-10 | End: 2024-10-10 | Stop reason: SDUPTHER

## 2024-10-10 RX ORDER — DIPHENHYDRAMINE HYDROCHLORIDE 50 MG/ML
25 INJECTION INTRAMUSCULAR; INTRAVENOUS
Status: COMPLETED | OUTPATIENT
Start: 2024-10-10 | End: 2024-10-10

## 2024-10-10 RX ORDER — AMLODIPINE BESYLATE 10 MG/1
10 TABLET ORAL DAILY
Status: DISCONTINUED | OUTPATIENT
Start: 2024-10-10 | End: 2024-10-16 | Stop reason: HOSPADM

## 2024-10-10 RX ORDER — SODIUM CHLORIDE 9 MG/ML
INJECTION, SOLUTION INTRAVENOUS ONCE
Status: DISCONTINUED | OUTPATIENT
Start: 2024-10-10 | End: 2024-10-10

## 2024-10-10 RX ORDER — IOPAMIDOL 755 MG/ML
100 INJECTION, SOLUTION INTRAVASCULAR
Status: COMPLETED | OUTPATIENT
Start: 2024-10-10 | End: 2024-10-10

## 2024-10-10 RX ORDER — SODIUM CHLORIDE 0.9 % (FLUSH) 0.9 %
5-40 SYRINGE (ML) INJECTION PRN
Status: DISCONTINUED | OUTPATIENT
Start: 2024-10-10 | End: 2024-10-16 | Stop reason: HOSPADM

## 2024-10-10 RX ORDER — ONDANSETRON 2 MG/ML
4 INJECTION INTRAMUSCULAR; INTRAVENOUS EVERY 6 HOURS PRN
Status: DISCONTINUED | OUTPATIENT
Start: 2024-10-10 | End: 2024-10-16 | Stop reason: HOSPADM

## 2024-10-10 RX ORDER — LEVETIRACETAM 500 MG/1
750 TABLET ORAL 2 TIMES DAILY
Status: DISCONTINUED | OUTPATIENT
Start: 2024-10-10 | End: 2024-10-16 | Stop reason: HOSPADM

## 2024-10-10 RX ORDER — CARVEDILOL 25 MG/1
25 TABLET ORAL 2 TIMES DAILY WITH MEALS
Status: DISCONTINUED | OUTPATIENT
Start: 2024-10-10 | End: 2024-10-15

## 2024-10-10 RX ORDER — POLYETHYLENE GLYCOL 3350 17 G
2 POWDER IN PACKET (EA) ORAL
Status: DISCONTINUED | OUTPATIENT
Start: 2024-10-10 | End: 2024-10-16 | Stop reason: HOSPADM

## 2024-10-10 RX ORDER — LORAZEPAM 1 MG/1
3 TABLET ORAL
Status: DISCONTINUED | OUTPATIENT
Start: 2024-10-10 | End: 2024-10-16 | Stop reason: HOSPADM

## 2024-10-10 RX ORDER — HYDRALAZINE HYDROCHLORIDE 20 MG/ML
10 INJECTION INTRAMUSCULAR; INTRAVENOUS EVERY 4 HOURS PRN
Status: DISCONTINUED | OUTPATIENT
Start: 2024-10-10 | End: 2024-10-16 | Stop reason: HOSPADM

## 2024-10-10 RX ORDER — LORAZEPAM 1 MG/1
1 TABLET ORAL
Status: DISCONTINUED | OUTPATIENT
Start: 2024-10-10 | End: 2024-10-16 | Stop reason: HOSPADM

## 2024-10-10 RX ORDER — LORAZEPAM 1 MG/1
4 TABLET ORAL
Status: DISCONTINUED | OUTPATIENT
Start: 2024-10-10 | End: 2024-10-16 | Stop reason: HOSPADM

## 2024-10-10 RX ORDER — OCTREOTIDE ACETATE 100 UG/ML
50 INJECTION, SOLUTION INTRAVENOUS; SUBCUTANEOUS ONCE
Status: COMPLETED | OUTPATIENT
Start: 2024-10-10 | End: 2024-10-10

## 2024-10-10 RX ORDER — CARVEDILOL 25 MG/1
25 TABLET ORAL ONCE
Status: COMPLETED | OUTPATIENT
Start: 2024-10-10 | End: 2024-10-10

## 2024-10-10 RX ADMIN — SODIUM CHLORIDE, PRESERVATIVE FREE 10 ML: 5 INJECTION INTRAVENOUS at 11:24

## 2024-10-10 RX ADMIN — THIAMINE HYDROCHLORIDE 100 MG: 100 INJECTION, SOLUTION INTRAMUSCULAR; INTRAVENOUS at 11:18

## 2024-10-10 RX ADMIN — LACTULOSE 10 G: 10 SOLUTION ORAL at 14:31

## 2024-10-10 RX ADMIN — SODIUM CHLORIDE 1 MG: 9 INJECTION INTRAMUSCULAR; INTRAVENOUS; SUBCUTANEOUS at 15:29

## 2024-10-10 RX ADMIN — WATER 1000 MG: 1 INJECTION INTRAMUSCULAR; INTRAVENOUS; SUBCUTANEOUS at 14:30

## 2024-10-10 RX ADMIN — OCTREOTIDE ACETATE 25 MCG/HR: 500 INJECTION, SOLUTION INTRAVENOUS; SUBCUTANEOUS at 03:33

## 2024-10-10 RX ADMIN — SODIUM CHLORIDE 2.5 MG/HR: 9 INJECTION, SOLUTION INTRAVENOUS at 01:02

## 2024-10-10 RX ADMIN — PANTOPRAZOLE SODIUM 40 MG: 40 INJECTION, POWDER, FOR SOLUTION INTRAVENOUS at 03:55

## 2024-10-10 RX ADMIN — IPRATROPIUM BROMIDE 0.5 MG: 0.5 SOLUTION RESPIRATORY (INHALATION) at 16:02

## 2024-10-10 RX ADMIN — ARFORMOTEROL TARTRATE 15 MCG: 15 SOLUTION RESPIRATORY (INHALATION) at 16:00

## 2024-10-10 RX ADMIN — LEVETIRACETAM 750 MG: 500 TABLET, FILM COATED ORAL at 14:31

## 2024-10-10 RX ADMIN — LACTULOSE 10 G: 10 SOLUTION ORAL at 20:59

## 2024-10-10 RX ADMIN — HYDRALAZINE HYDROCHLORIDE 10 MG: 20 INJECTION INTRAMUSCULAR; INTRAVENOUS at 12:41

## 2024-10-10 RX ADMIN — LACTULOSE 30 G: 10 SOLUTION ORAL at 01:04

## 2024-10-10 RX ADMIN — SODIUM CHLORIDE, PRESERVATIVE FREE 10 ML: 5 INJECTION INTRAVENOUS at 21:05

## 2024-10-10 RX ADMIN — LABETALOL HYDROCHLORIDE 20 MG: 5 INJECTION INTRAVENOUS at 00:54

## 2024-10-10 RX ADMIN — BUDESONIDE INHALATION 500 MCG: 0.5 SUSPENSION RESPIRATORY (INHALATION) at 16:01

## 2024-10-10 RX ADMIN — SODIUM CHLORIDE, PRESERVATIVE FREE 40 MG: 5 INJECTION INTRAVENOUS at 14:30

## 2024-10-10 RX ADMIN — WATER 125 MG: 1 INJECTION INTRAMUSCULAR; INTRAVENOUS; SUBCUTANEOUS at 00:47

## 2024-10-10 RX ADMIN — AMLODIPINE BESYLATE 10 MG: 10 TABLET ORAL at 12:41

## 2024-10-10 RX ADMIN — LEVETIRACETAM 750 MG: 500 TABLET, FILM COATED ORAL at 21:01

## 2024-10-10 RX ADMIN — DIPHENHYDRAMINE HYDROCHLORIDE 25 MG: 50 INJECTION INTRAMUSCULAR; INTRAVENOUS at 00:32

## 2024-10-10 RX ADMIN — LABETALOL HYDROCHLORIDE 10 MG: 5 INJECTION INTRAVENOUS at 11:18

## 2024-10-10 RX ADMIN — OCTREOTIDE ACETATE 25 MCG/HR: 500 INJECTION, SOLUTION INTRAVENOUS; SUBCUTANEOUS at 18:10

## 2024-10-10 RX ADMIN — CARVEDILOL 25 MG: 25 TABLET, FILM COATED ORAL at 17:38

## 2024-10-10 RX ADMIN — IOPAMIDOL 100 ML: 755 INJECTION, SOLUTION INTRAVENOUS at 00:46

## 2024-10-10 RX ADMIN — OCTREOTIDE ACETATE 50 MCG: 100 INJECTION, SOLUTION INTRAVENOUS; SUBCUTANEOUS at 03:30

## 2024-10-10 RX ADMIN — CARVEDILOL 25 MG: 25 TABLET, FILM COATED ORAL at 12:41

## 2024-10-10 ASSESSMENT — PAIN SCALES - GENERAL
PAINLEVEL_OUTOF10: 0

## 2024-10-10 NOTE — H&P
Answer:   sbp < 180     Order Specific Question:   Contact Provider if:     Answer:   Patient is receiving the maximum dose and is not achieving the goal of therapy    DISCONTD: pantoprazole (PROTONIX) 40 mg in sodium chloride (PF) 0.9 % 10 mL injection    octreotide (SANDOSTATIN) injection 50 mcg    octreotide (SANDOSTATIN) 500 mcg in sodium chloride 0.9 % 100 mL infusion    pantoprazole (PROTONIX) 40 mg in sodium chloride (PF) 0.9 % 10 mL injection    sodium chloride flush 0.9 % injection 5-40 mL    sodium chloride flush 0.9 % injection 5-40 mL    0.9 % sodium chloride infusion    thiamine (B-1) injection 100 mg    OR Linked Order Group     LORazepam (ATIVAN) tablet 1 mg     LORazepam (ATIVAN) 1 mg in sodium chloride (PF) 0.9 % 10 mL injection     LORazepam (ATIVAN) tablet 2 mg     LORazepam (ATIVAN) 2 mg in sodium chloride (PF) 0.9 % 10 mL injection     LORazepam (ATIVAN) tablet 3 mg     LORazepam (ATIVAN) 3 mg in sodium chloride (PF) 0.9 % 10 mL injection     LORazepam (ATIVAN) tablet 4 mg     LORazepam (ATIVAN) 4 mg in sodium chloride (PF) 0.9 % 10 mL injection       Prior to Admit Medications:  Current Outpatient Medications   Medication Instructions    amLODIPine (NORVASC) 10 mg, Oral, DAILY    cholestyramine light (PREVALITE) 4 GM/DOSE powder 2 TIMES DAILY WITH MEALS    ergocalciferol (ERGOCALCIFEROL) 50,000 Units    fluticasone-umeclidin-vilant (TRELEGY ELLIPTA) 100-62.5-25 MCG/INH AEPB 1 puff, Inhalation, DAILY    hydrALAZINE (APRESOLINE) 50 mg, Oral, EVERY 8 HOURS SCHEDULED    hydrOXYzine HCl (ATARAX) 25 mg, EVERY 4 HOURS PRN    lactulose (CHRONULAC) 10 g, Oral, DAILY    levETIRAcetam (KEPPRA) 750 mg, Oral, 2 TIMES DAILY    losartan (COZAAR) 100 mg, Oral, DAILY    magnesium oxide (MAG-OX) 400 mg, 2 TIMES DAILY    nicotine polacrilex (NICORETTE) 2 mg, Oral, PRN, Take 1 piece every 2 hours as needed     pantoprazole (PROTONIX) 40 mg, Oral, 2 TIMES DAILY BEFORE MEALS    potassium chloride (KLOR-CON M) 20

## 2024-10-10 NOTE — ED TRIAGE NOTES
Pt BIB EMS from home. Family found pt at home naked covered in feces in urine. AMS. Normally A&Ox4. Hx of cancer. Per family gait is off and slow to respond. More lethargic than confused. Dizziness. No recent falls.         220/130  96

## 2024-10-10 NOTE — ICUWATCH
RRT Clinical Rounding Nurse Progress Report      SUBJECTIVE: Patient assessed secondary to RN/provider concern - HTN urgency.      Vitals:    10/10/24 1116 10/10/24 1121 10/10/24 1126 10/10/24 1217   BP: (!) 188/90 (!) 183/110 (!) 174/98 (!) 193/102   Pulse: 89 83 81 79   Resp: 24 25 16 16   Temp:    98.2 °F (36.8 °C)   TempSrc:    Oral   SpO2: 98% 99% 99% 100%   Weight:       Height:          ASSESSMENT:  Patient lethargic, confused. Lives alone, hx etoh cirrhosis was found at home by family covered in urine/feces. HTN in ED, was on cardene drip, but was turned off and patient SBP in 190's. Bedside RN perfectserved the provider for clarification for PO BP meds.     PLAN:  Will follow per RRT Clinical Rounding Program protocol. Will re-evaluate the effectiveness of the BP meds.    Shun Urbano RN  [facility]: [phone]  [facility]: [phone]

## 2024-10-10 NOTE — ICUWATCH
RRT Clinical Rounding Nurse Update    Vitals:    10/10/24 1126 10/10/24 1217 10/10/24 1241 10/10/24 1400   BP: (!) 174/98 (!) 193/102 (!) 199/106 (!) 152/96   Pulse: 81 79 65    Resp: 16 16     Temp:  98.2 °F (36.8 °C)     TempSrc:  Oral     SpO2: 99% 100%     Weight:       Height:         ASSESSMENT:  Previous outreach assessment was reviewed. There have been no significant changes since previous assessment. BP has been as good as 152/96 and has been steadily increasing again. Patient has been receiving labetalol PRN, hydralazine PRN, amlodipine PO, and coreg PO.    PLAN:  Will follow per RRT Clinical Rounding Program protocol. Will continue to monitor BP management and help as needed.    Shun Urbano RN  [facility]: [phone]  [facility]: [phone]

## 2024-10-10 NOTE — ED PROVIDER NOTES
Emergency Department Provider Note       PCP: Tete De Guzman APRN - NP   Age: 64 y.o.   Sex: female     DISPOSITION    Condition at Disposition: Data Unavailable     No diagnosis found.    Medical Decision Making     64-year-old female with history of alcohol dependence presents with altered mental status.  She has new onset hyperammonemia, and age-indeterminate new stroke since her CT report of 2018 at [facility].    Patient will be admitted to the hospitalist service, she also has extreme hypertension necessitating 2 doses of labetalol and ultimately a Cardene drip.     1 or more chronic illnesses with a severe exacerbation or progression.  1 acute complicated illness or injury.  Prescription drug management performed.  Drug therapy given requiring intensive monitoring for toxicity.  Discussion with external consultants.  Shared medical decision making was utilized in creating the patients health plan today.  Social determinants of health impacting her care in case include chronic alcoholism    I independently ordered and reviewed each unique test.  I reviewed external records: ED visit note from an outside group.  I reviewed external records: provider visit note from outside specialist.  I reviewed external records: previous lab results from outside ED.   Daughters at bedside  I interpreted the CT Scan cranial computerized tomography shows a new left occipital CVA compared to outside CT scan of 2018.  I interpreted the cardiac telemetry as reveals normal sinus rhythm, rate is in the 80s, QRS is narrow, there is an occasional PVC noted..  My Independent EKG Interpretation: sinus rhythm with premature ventricular contractions, no acute changes, non-specific EKG, left ventricular hypertrophy, and prolonged QT interval      ST Segments:Nonspecific ST segments - NO STEMI   Rate: 89  The patient was admitted and I have discussed patient management with the admitting

## 2024-10-10 NOTE — CONSULTS
WC  budesonide (PULMICORT) nebulizer suspension 500 mcg, BID RT  arformoterol tartrate (BROVANA) nebulizer solution 15 mcg, BID RT  ipratropium (ATROVENT) 0.02 % nebulizer solution 0.5 mg, 4x Daily RT  cefTRIAXone (ROCEPHIN) 1,000 mg in sterile water 10 mL IV syringe, Q24H  octreotide (SANDOSTATIN) 500 mcg in sodium chloride 0.9 % 100 mL infusion, Continuous        Allergies   Lisinopril    Social History     Social History       Tobacco History       Smoking Status  Every Day Current Packs/Day  2.0 packs/day Smoking Tobacco Type  Cigarettes   Pack Year History     Packs/Day From To Years    2   0.0      Smokeless Tobacco Use  Current              Alcohol History       Alcohol Use Status  Yes              Drug Use       Drug Use Status  No              Sexual Activity       Sexually Active  Not Asked                    Family History     Family History   Problem Relation Age of Onset    Lung Disease Mother        Review of Systems   Review of Systems   Gastrointestinal:  Positive for abdominal pain and blood in stool. Negative for diarrhea, nausea and vomiting.   All other systems reviewed and are negative.       Physical Exam   BP (!) 199/106   Pulse 65   Temp 98.2 °F (36.8 °C) (Oral)   Resp 16   Ht 1.651 m (5' 5\")   Wt 83.9 kg (185 lb)   SpO2 100%   BMI 30.79 kg/m²      Physical Exam  Vitals and nursing note reviewed.   Constitutional:       Appearance: Normal appearance.   HENT:      Head: Normocephalic and atraumatic.   Eyes:      Conjunctiva/sclera: Conjunctivae normal.   Cardiovascular:      Rate and Rhythm: Normal rate and regular rhythm.      Heart sounds: Normal heart sounds.   Pulmonary:      Breath sounds: Normal breath sounds.   Abdominal:      Palpations: Abdomen is soft.      Tenderness: There is abdominal tenderness.   Neurological:      Mental Status: She is alert and oriented to person, place, and time.         Labs    CBC:  Recent Labs     10/09/24  2321 10/10/24  0312 10/10/24  1142   WBC

## 2024-10-10 NOTE — ED PROVIDER NOTES
Patient is admitted to the hospital secondary to concerns over CVA.  Patient has not been seen or evaluated by the admitting doctor yet and the nursing staff came to get me because she had a bowel movement which had blood in it.  With her history of alcoholism and fecal occult positive I did order Protonix 40 mg IV along with Sandostatin bolus and drip.  Repeat H&H will be obtained as well.  Admitting doctor will be updated about the new findings.     Kaz Barnes,   10/10/24 0300      Hospitalist requested a CT angio abdomen pelvis which has been ordered.     Kaz Barnes, DO  10/10/24 0339

## 2024-10-10 NOTE — ED NOTES
TRANSFER - OUT REPORT:    Verbal report given to Nelly GIL on Kerri Daniels  being transferred to [facility] for routine progression of patient care       Report consisted of patient's Situation, Background, Assessment and   Recommendations(SBAR).     Information from the following report(s) Nurse Handoff Report, ED SBAR, Intake/Output, MAR, and Recent Results was reviewed with the receiving nurse.    [place] Fall Assessment:    Presents to emergency department  because of falls (Syncope, seizure, or loss of consciousness): No  Age > 70: No  Altered Mental Status, Intoxication with alcohol or substance confusion (Disorientation, impaired judgment, poor safety awaremess, or inability to follow instructions): Yes  Impaired Mobility: Ambulates or transfers with assistive devices or assistance; Unable to ambulate or transer.: Yes  Nursing Judgement: Yes          Lines:   Peripheral IV 10/10/24 Left Antecubital (Active)        Opportunity for questions and clarification was provided.      Patient transported with:  Pamela Bradshaw RN  10/10/24 9165

## 2024-10-11 ENCOUNTER — APPOINTMENT (OUTPATIENT)
Dept: CT IMAGING | Age: 64
DRG: 199 | End: 2024-10-11
Payer: MEDICAID

## 2024-10-11 ENCOUNTER — ANESTHESIA (OUTPATIENT)
Dept: ENDOSCOPY | Age: 64
End: 2024-10-11
Payer: MEDICAID

## 2024-10-11 LAB
AMMONIA PLAS-SCNC: 74 UMOL/L (ref 11–51)
ANION GAP SERPL CALC-SCNC: 12 MMOL/L (ref 9–18)
APTT PPP: 31.5 SEC (ref 23.3–37.4)
BUN SERPL-MCNC: 30 MG/DL (ref 8–23)
CALCIUM SERPL-MCNC: 9.4 MG/DL (ref 8.8–10.2)
CHLORIDE SERPL-SCNC: 110 MMOL/L (ref 98–107)
CO2 SERPL-SCNC: 19 MMOL/L (ref 20–28)
CREAT SERPL-MCNC: 1.41 MG/DL (ref 0.6–1.1)
GLUCOSE SERPL-MCNC: 102 MG/DL (ref 70–99)
HCT VFR BLD AUTO: 23.7 % (ref 35.8–46.3)
HCT VFR BLD AUTO: 23.8 % (ref 35.8–46.3)
HCT VFR BLD AUTO: 27.5 % (ref 35.8–46.3)
HCT VFR BLD AUTO: 28.4 % (ref 35.8–46.3)
HGB BLD-MCNC: 7.4 G/DL (ref 11.7–15.4)
HGB BLD-MCNC: 7.4 G/DL (ref 11.7–15.4)
HGB BLD-MCNC: 8.5 G/DL (ref 11.7–15.4)
HGB BLD-MCNC: 8.6 G/DL (ref 11.7–15.4)
INR PPP: 1.4
IRON SATN MFR SERPL: 7 % (ref 20–50)
IRON SERPL-MCNC: 25 UG/DL (ref 35–100)
POTASSIUM SERPL-SCNC: 4.1 MMOL/L (ref 3.5–5.1)
PROTHROMBIN TIME: 17.4 SEC (ref 11.3–14.9)
SODIUM SERPL-SCNC: 141 MMOL/L (ref 136–145)
TIBC SERPL-MCNC: 338 UG/DL (ref 240–450)
UIBC SERPL-MCNC: 313 UG/DL (ref 112–347)

## 2024-10-11 PROCEDURE — 97162 PT EVAL MOD COMPLEX 30 MIN: CPT

## 2024-10-11 PROCEDURE — 85014 HEMATOCRIT: CPT

## 2024-10-11 PROCEDURE — 85018 HEMOGLOBIN: CPT

## 2024-10-11 PROCEDURE — 82140 ASSAY OF AMMONIA: CPT

## 2024-10-11 PROCEDURE — 6360000002 HC RX W HCPCS: Performed by: FAMILY MEDICINE

## 2024-10-11 PROCEDURE — 74174 CTA ABD&PLVS W/CONTRAST: CPT

## 2024-10-11 PROCEDURE — 97166 OT EVAL MOD COMPLEX 45 MIN: CPT

## 2024-10-11 PROCEDURE — 94640 AIRWAY INHALATION TREATMENT: CPT

## 2024-10-11 PROCEDURE — 36415 COLL VENOUS BLD VENIPUNCTURE: CPT

## 2024-10-11 PROCEDURE — 85610 PROTHROMBIN TIME: CPT

## 2024-10-11 PROCEDURE — 2580000003 HC RX 258: Performed by: NURSE ANESTHETIST, CERTIFIED REGISTERED

## 2024-10-11 PROCEDURE — 6360000002 HC RX W HCPCS

## 2024-10-11 PROCEDURE — 2580000003 HC RX 258

## 2024-10-11 PROCEDURE — 83550 IRON BINDING TEST: CPT

## 2024-10-11 PROCEDURE — 3700000000 HC ANESTHESIA ATTENDED CARE: Performed by: INTERNAL MEDICINE

## 2024-10-11 PROCEDURE — 6360000002 HC RX W HCPCS: Performed by: NURSE ANESTHETIST, CERTIFIED REGISTERED

## 2024-10-11 PROCEDURE — 2709999900 HC NON-CHARGEABLE SUPPLY: Performed by: INTERNAL MEDICINE

## 2024-10-11 PROCEDURE — 74178 CT ABD&PLV WO CNTR FLWD CNTR: CPT | Performed by: RADIOLOGY

## 2024-10-11 PROCEDURE — 74178 CT ABD&PLV WO CNTR FLWD CNTR: CPT

## 2024-10-11 PROCEDURE — 6360000004 HC RX CONTRAST MEDICATION: Performed by: EMERGENCY MEDICINE

## 2024-10-11 PROCEDURE — 2580000003 HC RX 258: Performed by: FAMILY MEDICINE

## 2024-10-11 PROCEDURE — 85730 THROMBOPLASTIN TIME PARTIAL: CPT

## 2024-10-11 PROCEDURE — 83540 ASSAY OF IRON: CPT

## 2024-10-11 PROCEDURE — 97530 THERAPEUTIC ACTIVITIES: CPT

## 2024-10-11 PROCEDURE — 80048 BASIC METABOLIC PNL TOTAL CA: CPT

## 2024-10-11 PROCEDURE — 43235 EGD DIAGNOSTIC BRUSH WASH: CPT | Performed by: INTERNAL MEDICINE

## 2024-10-11 PROCEDURE — 3609017100 HC EGD: Performed by: INTERNAL MEDICINE

## 2024-10-11 PROCEDURE — 6370000000 HC RX 637 (ALT 250 FOR IP): Performed by: INTERNAL MEDICINE

## 2024-10-11 PROCEDURE — 97535 SELF CARE MNGMENT TRAINING: CPT

## 2024-10-11 PROCEDURE — 0DJ08ZZ INSPECTION OF UPPER INTESTINAL TRACT, VIA NATURAL OR ARTIFICIAL OPENING ENDOSCOPIC: ICD-10-PCS | Performed by: INTERNAL MEDICINE

## 2024-10-11 PROCEDURE — 6370000000 HC RX 637 (ALT 250 FOR IP)

## 2024-10-11 PROCEDURE — 7100000010 HC PHASE II RECOVERY - FIRST 15 MIN: Performed by: INTERNAL MEDICINE

## 2024-10-11 PROCEDURE — 2500000003 HC RX 250 WO HCPCS: Performed by: NURSE ANESTHETIST, CERTIFIED REGISTERED

## 2024-10-11 PROCEDURE — 2060000000 HC ICU INTERMEDIATE R&B

## 2024-10-11 PROCEDURE — 99222 1ST HOSP IP/OBS MODERATE 55: CPT | Performed by: INTERNAL MEDICINE

## 2024-10-11 PROCEDURE — 7100000011 HC PHASE II RECOVERY - ADDTL 15 MIN: Performed by: INTERNAL MEDICINE

## 2024-10-11 PROCEDURE — 94761 N-INVAS EAR/PLS OXIMETRY MLT: CPT

## 2024-10-11 PROCEDURE — 76937 US GUIDE VASCULAR ACCESS: CPT

## 2024-10-11 RX ORDER — SODIUM CHLORIDE, SODIUM LACTATE, POTASSIUM CHLORIDE, CALCIUM CHLORIDE 600; 310; 30; 20 MG/100ML; MG/100ML; MG/100ML; MG/100ML
INJECTION, SOLUTION INTRAVENOUS CONTINUOUS
Status: DISCONTINUED | OUTPATIENT
Start: 2024-10-11 | End: 2024-10-11

## 2024-10-11 RX ORDER — SODIUM CHLORIDE 0.9 % (FLUSH) 0.9 %
5-40 SYRINGE (ML) INJECTION EVERY 12 HOURS SCHEDULED
Status: DISCONTINUED | OUTPATIENT
Start: 2024-10-11 | End: 2024-10-11

## 2024-10-11 RX ORDER — SODIUM CHLORIDE 0.9 % (FLUSH) 0.9 %
5-40 SYRINGE (ML) INJECTION PRN
Status: DISCONTINUED | OUTPATIENT
Start: 2024-10-11 | End: 2024-10-11

## 2024-10-11 RX ORDER — LIDOCAINE HYDROCHLORIDE 20 MG/ML
INJECTION, SOLUTION EPIDURAL; INFILTRATION; INTRACAUDAL; PERINEURAL
Status: DISCONTINUED | OUTPATIENT
Start: 2024-10-11 | End: 2024-10-11 | Stop reason: SDUPTHER

## 2024-10-11 RX ORDER — SODIUM CHLORIDE 9 MG/ML
INJECTION, SOLUTION INTRAVENOUS PRN
Status: DISCONTINUED | OUTPATIENT
Start: 2024-10-11 | End: 2024-10-11

## 2024-10-11 RX ORDER — DEXTROSE MONOHYDRATE 100 MG/ML
INJECTION, SOLUTION INTRAVENOUS CONTINUOUS PRN
Status: DISCONTINUED | OUTPATIENT
Start: 2024-10-11 | End: 2024-10-11

## 2024-10-11 RX ORDER — VALSARTAN 40 MG/1
20 TABLET ORAL DAILY
Status: DISCONTINUED | OUTPATIENT
Start: 2024-10-11 | End: 2024-10-16 | Stop reason: HOSPADM

## 2024-10-11 RX ORDER — IBUPROFEN 600 MG/1
1 TABLET ORAL PRN
Status: DISCONTINUED | OUTPATIENT
Start: 2024-10-11 | End: 2024-10-11

## 2024-10-11 RX ORDER — FOLIC ACID 1 MG/1
1 TABLET ORAL DAILY
Status: DISCONTINUED | OUTPATIENT
Start: 2024-10-11 | End: 2024-10-16 | Stop reason: HOSPADM

## 2024-10-11 RX ORDER — PROPOFOL 10 MG/ML
INJECTION, EMULSION INTRAVENOUS
Status: DISCONTINUED | OUTPATIENT
Start: 2024-10-11 | End: 2024-10-11 | Stop reason: SDUPTHER

## 2024-10-11 RX ORDER — IOPAMIDOL 755 MG/ML
100 INJECTION, SOLUTION INTRAVASCULAR
Status: COMPLETED | OUTPATIENT
Start: 2024-10-11 | End: 2024-10-11

## 2024-10-11 RX ORDER — LIDOCAINE HYDROCHLORIDE 10 MG/ML
1 INJECTION, SOLUTION INFILTRATION; PERINEURAL
Status: DISCONTINUED | OUTPATIENT
Start: 2024-10-11 | End: 2024-10-11

## 2024-10-11 RX ORDER — SODIUM CHLORIDE 0.9 % (FLUSH) 0.9 %
SYRINGE (ML) INJECTION
Status: DISCONTINUED | OUTPATIENT
Start: 2024-10-11 | End: 2024-10-11 | Stop reason: SDUPTHER

## 2024-10-11 RX ORDER — NALOXONE HYDROCHLORIDE 0.4 MG/ML
INJECTION, SOLUTION INTRAMUSCULAR; INTRAVENOUS; SUBCUTANEOUS PRN
Status: DISCONTINUED | OUTPATIENT
Start: 2024-10-11 | End: 2024-10-11

## 2024-10-11 RX ADMIN — LACTULOSE 10 G: 10 SOLUTION ORAL at 21:56

## 2024-10-11 RX ADMIN — THIAMINE HYDROCHLORIDE 100 MG: 100 INJECTION, SOLUTION INTRAMUSCULAR; INTRAVENOUS at 09:15

## 2024-10-11 RX ADMIN — ARFORMOTEROL TARTRATE 15 MCG: 15 SOLUTION RESPIRATORY (INHALATION) at 09:03

## 2024-10-11 RX ADMIN — SODIUM CHLORIDE, PRESERVATIVE FREE 10 ML: 5 INJECTION INTRAVENOUS at 22:07

## 2024-10-11 RX ADMIN — LIDOCAINE HYDROCHLORIDE 100 MG: 20 INJECTION, SOLUTION EPIDURAL; INFILTRATION; INTRACAUDAL; PERINEURAL at 08:05

## 2024-10-11 RX ADMIN — PROPOFOL 50 MG: 10 INJECTION, EMULSION INTRAVENOUS at 08:10

## 2024-10-11 RX ADMIN — VALSARTAN 20 MG: 40 TABLET, FILM COATED ORAL at 13:34

## 2024-10-11 RX ADMIN — FOLIC ACID 1 MG: 1 TABLET ORAL at 13:34

## 2024-10-11 RX ADMIN — SODIUM CHLORIDE, PRESERVATIVE FREE 40 MG: 5 INJECTION INTRAVENOUS at 00:07

## 2024-10-11 RX ADMIN — SODIUM CHLORIDE, PRESERVATIVE FREE 50 ML: 5 INJECTION INTRAVENOUS at 08:12

## 2024-10-11 RX ADMIN — LEVETIRACETAM 750 MG: 500 TABLET, FILM COATED ORAL at 09:15

## 2024-10-11 RX ADMIN — ARFORMOTEROL TARTRATE 15 MCG: 15 SOLUTION RESPIRATORY (INHALATION) at 19:23

## 2024-10-11 RX ADMIN — PROPOFOL 50 MG: 10 INJECTION, EMULSION INTRAVENOUS at 08:06

## 2024-10-11 RX ADMIN — IPRATROPIUM BROMIDE 0.5 MG: 0.5 SOLUTION RESPIRATORY (INHALATION) at 09:03

## 2024-10-11 RX ADMIN — SODIUM CHLORIDE, PRESERVATIVE FREE 40 MG: 5 INJECTION INTRAVENOUS at 12:30

## 2024-10-11 RX ADMIN — SODIUM CHLORIDE 1 MG: 9 INJECTION INTRAMUSCULAR; INTRAVENOUS; SUBCUTANEOUS at 22:07

## 2024-10-11 RX ADMIN — LACTULOSE 10 G: 10 SOLUTION ORAL at 09:15

## 2024-10-11 RX ADMIN — AMLODIPINE BESYLATE 10 MG: 10 TABLET ORAL at 09:15

## 2024-10-11 RX ADMIN — WATER 1000 MG: 1 INJECTION INTRAMUSCULAR; INTRAVENOUS; SUBCUTANEOUS at 12:30

## 2024-10-11 RX ADMIN — LEVETIRACETAM 750 MG: 500 TABLET, FILM COATED ORAL at 21:56

## 2024-10-11 RX ADMIN — LACTULOSE 10 G: 10 SOLUTION ORAL at 13:33

## 2024-10-11 RX ADMIN — SODIUM CHLORIDE, PRESERVATIVE FREE 100 ML: 5 INJECTION INTRAVENOUS at 08:06

## 2024-10-11 RX ADMIN — BUDESONIDE INHALATION 500 MCG: 0.5 SUSPENSION RESPIRATORY (INHALATION) at 19:23

## 2024-10-11 RX ADMIN — BUDESONIDE INHALATION 500 MCG: 0.5 SUSPENSION RESPIRATORY (INHALATION) at 09:03

## 2024-10-11 RX ADMIN — IOPAMIDOL 100 ML: 755 INJECTION, SOLUTION INTRAVENOUS at 11:39

## 2024-10-11 RX ADMIN — OCTREOTIDE ACETATE 25 MCG/HR: 500 INJECTION, SOLUTION INTRAVENOUS; SUBCUTANEOUS at 14:17

## 2024-10-11 ASSESSMENT — PAIN SCALES - GENERAL
PAINLEVEL_OUTOF10: 0
PAINLEVEL_OUTOF10: 5
PAINLEVEL_OUTOF10: 0

## 2024-10-11 ASSESSMENT — PAIN - FUNCTIONAL ASSESSMENT
PAIN_FUNCTIONAL_ASSESSMENT: ADULT NONVERBAL PAIN SCALE (NPVS)
PAIN_FUNCTIONAL_ASSESSMENT: NONE - DENIES PAIN

## 2024-10-11 NOTE — PLAN OF CARE
EGD complete. Significant for PHG, this is likely the source of ongoing anemia and blood loss. No active bleeding seen on exam. See procedure report for complete findings.     - Continue Carvedilol   - Continue to stress importance of stopping all alcohol use   - Follow up with [facility]     GI team will sign off at this time. Please call with any questions or concerns.

## 2024-10-11 NOTE — PLAN OF CARE
Problem: Discharge Planning  Goal: Discharge to home or other facility with appropriate resources  Outcome: Progressing     Problem: Pain  Goal: Verbalizes/displays adequate comfort level or baseline comfort level  Outcome: Progressing     Problem: Safety - Adult  Goal: Free from fall injury  Outcome: Progressing     Problem: ABCDS Injury Assessment  Goal: Absence of physical injury  Outcome: Progressing     Problem: Respiratory - Adult  Goal: Achieves optimal ventilation and oxygenation  10/11/2024 0132 by Naveed Agustin RN  Outcome: Progressing  10/10/2024 1607 by Tony Dunlap RCP  Outcome: Progressing     Problem: Skin/Tissue Integrity  Goal: Absence of new skin breakdown  Description: 1.  Monitor for areas of redness and/or skin breakdown  2.  Assess vascular access sites hourly  3.  Every 4-6 hours minimum:  Change oxygen saturation probe site  4.  Every 4-6 hours:  If on nasal continuous positive airway pressure, respiratory therapy assess nares and determine need for appliance change or resting period.  Outcome: Progressing

## 2024-10-11 NOTE — CARE COORDINATION
CM spoke to patient at bedside on this day. Patient confirmed demographic information and insurance information. Patient reports that she lives with alone, in an apartment with 2 steps to enter. Patient reports that she is independent with ADLs, uses no DMEs, and is not an active . Patient has no home care services. Patient confirmed PCP information and last PCP visit is unknown. PT/OT recommending STR for patient.     CM will continue to follow patient for any discharge planning needs.        10/11/24 0083   Service Assessment   Patient Orientation Alert and Oriented   Cognition Alert   History Provided By Patient   Primary Caregiver Self   Support Systems Children   Patient's Healthcare Decision Maker is: Legal Next of Kin   PCP Verified by CM Yes  (confirmed PCP is Dr. Tete De Guzman)   Last Visit to PCP   (unknown)   Prior Functional Level Independent in ADLs/IADLs   Current Functional Level Other (see comment)  (TBD by clinicals)   Can patient return to prior living arrangement Yes   Ability to make needs known: Good   Family able to assist with home care needs: Yes   Would you like for me to discuss the discharge plan with any other family members/significant others, and if so, who? Yes   Financial Resources Medicaid  ([employer] [place] Medicaid)   Community Resources None   CM/SW Referral Other (see comment)  (discharge planning)   Social/Functional History   Lives With Alone   Type of Home Apartment   Home Layout One level   Home Access Stairs to enter with rails   Entrance Stairs - Number of Steps 2 steps   Entrance Stairs - Rails Both   Bathroom Shower/Tub Tub/Shower unit   Bathroom Toilet Standard   Bathroom Equipment None   Bathroom Accessibility Accessible   Home Equipment None   Receives Help From Family   ADL Assistance Independent   Homemaking Assistance Independent   Homemaking Responsibilities Yes   Ambulation Assistance Independent   Transfer Assistance Independent   Active  No

## 2024-10-11 NOTE — ANESTHESIA POSTPROCEDURE EVALUATION
Department of Anesthesiology  Postprocedure Note    Patient: Kerri Daniels  MRN: [id number]  YOB: 1960  Date of evaluation: 10/11/2024    Procedure Summary       Date: 10/11/24 Room / Location: [facility] ENDO 03 / [facility] ENDOSCOPY    Anesthesia Start: 0800 Anesthesia Stop: 0818    Procedure: ESOPHAGOGASTRODUODENOSCOPY (Upper GI Region) Diagnosis:       Melena      Varices, esophageal (HCC)      (Melena [K92.1])      (Varices, esophageal (HCC) [I85.00])    Surgeons: Ivonne Silva MD Responsible Provider: Roberto Eng MD    Anesthesia Type: TIVA ASA Status: 4 - Emergent            Anesthesia Type: TIVA    José Miguel Phase I: José Miguel Score: 10    José Miguel Phase II: José Miguel Score: 10    Anesthesia Post Evaluation    Patient location during evaluation: PACU  Patient participation: complete - patient participated  Level of consciousness: awake and alert  Airway patency: patent  Nausea: well controlled.  Cardiovascular status: acceptable.  Respiratory status: acceptable  Hydration status: stable  Pain management: adequate    No notable events documented.

## 2024-10-11 NOTE — ANESTHESIA PRE PROCEDURE
Department of Anesthesiology  Preprocedure Note       Name:  Kerri Daniels   Age:  64 y.o.  :  1960                                          MRN:  [id number]         Date:  10/11/2024      Surgeon: Surgeon(s):  Ivonne Silva MD    Procedure: Procedure(s):  ESOPHAGOGASTRODUODENOSCOPY    Medications prior to admission:   Prior to Admission medications    Medication Sig Start Date End Date Taking? Authorizing Provider   lactulose (CHRONULAC) 10 GM/15ML solution Take 15 mLs by mouth daily    Elana Dempsey MD   nicotine polacrilex (NICORETTE) 2 MG gum Take 1 each by mouth as needed for Smoking cessation Take 1 piece every 2 hours as needed    Elana Dempsey MD   fluticasone-umeclidin-vilant (TRELEGY ELLIPTA) 100-62.5-25 MCG/INH AEPB Inhale 1 puff into the lungs daily 10/31/22   Isabel Ferrer PA   levETIRAcetam (KEPPRA) 750 MG tablet Take 1 tablet by mouth 2 times daily 10/31/22 3/13/23  Isabel Ferrer PA   hydrALAZINE (APRESOLINE) 50 MG tablet Take 1 tablet by mouth every 8 hours 10/31/22 3/13/23  Isabel Ferrer PA   losartan (COZAAR) 100 MG tablet Take 1 tablet by mouth daily 11/1/22 3/13/23  Isabel Ferrer PA   amLODIPine (NORVASC) 10 MG tablet Take 1 tablet by mouth daily 11/1/22 3/13/23  Isabel Ferrer PA   pantoprazole (PROTONIX) 40 MG tablet Take 1 tablet by mouth 2 times daily (before meals) 10/31/22 11/30/22  Isabel Ferrer PA   spironolactone (ALDACTONE) 50 MG tablet Take 50 mg by mouth daily  Patient not taking: Reported on 3/13/2023    Elana Dempsey MD   cholestyramine light (PREVALITE) 4 GM/DOSE powder Take by mouth 2 times daily (with meals)  Patient not taking: Reported on 3/13/2023    Automatic Reconciliation, Ar   ergocalciferol (ERGOCALCIFEROL) 1.25 MG (11367 UT) capsule Take 50,000 Units by mouth  Patient not taking: Reported on 3/13/2023    Automatic Reconciliation, Ar   hydrOXYzine (ATARAX) 25 MG tablet Take 25 mg by mouth every 4 hours as needed  Patient not taking: Reported

## 2024-10-11 NOTE — ICUWATCH
RRT Clinical Rounding Nurse Update    Vitals:    10/10/24 2341 10/11/24 0000 10/11/24 0100 10/11/24 0200   BP: (!) 154/85 (!) 163/82 (!) 151/84 (!) 144/81   Pulse: 51 50 52 52   Resp: 14      Temp: 97.5 °F (36.4 °C)      TempSrc: Oral      SpO2: 98%      Weight:       Height:            DETERIORATION INDEX SCORE: 44    ASSESSMENT:  Previous outreach assessment was reviewed.  Pt is resting in bed, eyes closed at time of exam. Unlabored regular breathing on RA. MRI earlier in the shift. Spoke with primary RN no major complaints. Hypertension better controlled tonight. VSS.     MRI Brain Impression:  Right occipital lobe chronic infarct     PLAN:  Will discharge from RRT Clinical Rounding Program per protocol. Please call if needed.    Michael Kaplan RN  [facility]: [phone]  [facility], [place]: [phone]

## 2024-10-12 LAB
HCT VFR BLD AUTO: 24.6 % (ref 35.8–46.3)
HCT VFR BLD AUTO: 25.1 % (ref 35.8–46.3)
HCT VFR BLD AUTO: 27.4 % (ref 35.8–46.3)
HCT VFR BLD AUTO: 27.7 % (ref 35.8–46.3)
HGB BLD-MCNC: 7.7 G/DL (ref 11.7–15.4)
HGB BLD-MCNC: 7.7 G/DL (ref 11.7–15.4)
HGB BLD-MCNC: 8.5 G/DL (ref 11.7–15.4)
HGB BLD-MCNC: 8.6 G/DL (ref 11.7–15.4)

## 2024-10-12 PROCEDURE — 36415 COLL VENOUS BLD VENIPUNCTURE: CPT

## 2024-10-12 PROCEDURE — 94640 AIRWAY INHALATION TREATMENT: CPT

## 2024-10-12 PROCEDURE — 6360000002 HC RX W HCPCS

## 2024-10-12 PROCEDURE — 6360000002 HC RX W HCPCS: Performed by: FAMILY MEDICINE

## 2024-10-12 PROCEDURE — 6370000000 HC RX 637 (ALT 250 FOR IP)

## 2024-10-12 PROCEDURE — 6370000000 HC RX 637 (ALT 250 FOR IP): Performed by: INTERNAL MEDICINE

## 2024-10-12 PROCEDURE — 6360000002 HC RX W HCPCS: Performed by: INTERNAL MEDICINE

## 2024-10-12 PROCEDURE — 2580000003 HC RX 258

## 2024-10-12 PROCEDURE — 2580000003 HC RX 258: Performed by: FAMILY MEDICINE

## 2024-10-12 PROCEDURE — 85018 HEMOGLOBIN: CPT

## 2024-10-12 PROCEDURE — 85014 HEMATOCRIT: CPT

## 2024-10-12 PROCEDURE — 2060000000 HC ICU INTERMEDIATE R&B

## 2024-10-12 RX ORDER — CARVEDILOL 25 MG/1
25 TABLET ORAL 2 TIMES DAILY WITH MEALS
Qty: 60 TABLET | Refills: 0 | Status: SHIPPED | OUTPATIENT
Start: 2024-10-12 | End: 2024-11-11

## 2024-10-12 RX ORDER — FOLIC ACID 1 MG/1
1 TABLET ORAL DAILY
Qty: 30 TABLET | Refills: 0 | Status: SHIPPED | OUTPATIENT
Start: 2024-10-13 | End: 2024-11-12

## 2024-10-12 RX ADMIN — SODIUM CHLORIDE, PRESERVATIVE FREE 10 ML: 5 INJECTION INTRAVENOUS at 08:36

## 2024-10-12 RX ADMIN — BUDESONIDE INHALATION 500 MCG: 0.5 SUSPENSION RESPIRATORY (INHALATION) at 19:46

## 2024-10-12 RX ADMIN — SODIUM CHLORIDE, PRESERVATIVE FREE 40 MG: 5 INJECTION INTRAVENOUS at 12:09

## 2024-10-12 RX ADMIN — LACTULOSE 10 G: 10 SOLUTION ORAL at 08:36

## 2024-10-12 RX ADMIN — IPRATROPIUM BROMIDE 0.5 MG: 0.5 SOLUTION RESPIRATORY (INHALATION) at 19:47

## 2024-10-12 RX ADMIN — FOLIC ACID 1 MG: 1 TABLET ORAL at 08:35

## 2024-10-12 RX ADMIN — VALSARTAN 20 MG: 40 TABLET, FILM COATED ORAL at 08:35

## 2024-10-12 RX ADMIN — SODIUM CHLORIDE, PRESERVATIVE FREE 10 ML: 5 INJECTION INTRAVENOUS at 20:27

## 2024-10-12 RX ADMIN — ARFORMOTEROL TARTRATE 15 MCG: 15 SOLUTION RESPIRATORY (INHALATION) at 07:33

## 2024-10-12 RX ADMIN — BUDESONIDE INHALATION 500 MCG: 0.5 SUSPENSION RESPIRATORY (INHALATION) at 07:33

## 2024-10-12 RX ADMIN — WATER 1000 MG: 1 INJECTION INTRAMUSCULAR; INTRAVENOUS; SUBCUTANEOUS at 12:09

## 2024-10-12 RX ADMIN — LACTULOSE 10 G: 10 SOLUTION ORAL at 20:24

## 2024-10-12 RX ADMIN — LEVETIRACETAM 750 MG: 500 TABLET, FILM COATED ORAL at 20:24

## 2024-10-12 RX ADMIN — ARFORMOTEROL TARTRATE 15 MCG: 15 SOLUTION RESPIRATORY (INHALATION) at 19:47

## 2024-10-12 RX ADMIN — AMLODIPINE BESYLATE 10 MG: 10 TABLET ORAL at 08:36

## 2024-10-12 RX ADMIN — THIAMINE HYDROCHLORIDE 100 MG: 100 INJECTION, SOLUTION INTRAMUSCULAR; INTRAVENOUS at 08:36

## 2024-10-12 RX ADMIN — LEVETIRACETAM 750 MG: 500 TABLET, FILM COATED ORAL at 08:35

## 2024-10-12 RX ADMIN — LACTULOSE 10 G: 10 SOLUTION ORAL at 14:48

## 2024-10-12 RX ADMIN — IPRATROPIUM BROMIDE 0.5 MG: 0.5 SOLUTION RESPIRATORY (INHALATION) at 07:33

## 2024-10-12 RX ADMIN — SODIUM CHLORIDE, PRESERVATIVE FREE 40 MG: 5 INJECTION INTRAVENOUS at 00:26

## 2024-10-12 NOTE — DISCHARGE SUMMARY
following:    Height as of this encounter: 1.651 m (5' 5\").    Weight as of this encounter: 61.2 kg (135 lb).    Intake/Output Summary (Last 24 hours) at 10/12/2024 0906  Last data filed at 10/12/2024 0411  Gross per 24 hour   Intake 670 ml   Output 0 ml   Net 670 ml         Physical Exam:  BP (!) 145/87   Pulse 55   Temp 97.3 °F (36.3 °C) (Axillary)   Resp 18   Ht 1.651 m (5' 5\")   Wt 61.2 kg (135 lb)   SpO2 99%   BMI 22.47 kg/m²      General:          Well nourished.    Patient is alert and oriented to place, time and person.   She is sitting up in bed and eating breakfast.   No shortness of breath.    No respiratory distress.   Pale.   Not icteric.   Head:               Normocephalic, atraumatic  Eyes:               Sclerae appear normal.  Pupils equally round.  ENT:                Nares appear normal.  Moist oral mucosa  Neck:               No restricted ROM.  Trachea midline   CV:                  RRR.  No m/r/g.  No jugular venous distension.  Lungs:             CTAB.  No wheezing, rhonchi, or rales.  Symmetric expansion.  Abdomen:        Soft, nontender, nondistended.  Extremities:     No cyanosis or clubbing.  No edema  Skin:                No rashes.  Normal coloration.   Warm and dry.    Neuro:             CN II-XII grossly intact.    Psych:             Normal mood and affect.        Signed:  Katina Spence MD    Part of this note may have been written by using a voice dictation software.  The note has been proof read but may still contain some grammatical/other typographical errors.

## 2024-10-12 NOTE — DISCHARGE INSTRUCTIONS
You were admitted to the hospital for concerns of a GI bleed and for elevated blood pressures.  A scope was done which showed extensive inflammation related to your underlying portal hypertension in the setting of your cirrhosis.  You were started on a medication called pantoprazole which you will take twice a day for the next 4 to 8 weeks.  Your blood pressure medications were also changed and you were started on a medication called valsartan 20 mg.  He will take this instead of losartan.  Your Coreg was reduced to 6.25 mg twice a day to help control your blood pressure and your heart rate.  You also had your lactulose increased to 3 times a day which you should take for goal bowel movement of 2 to 3/day.    New medication  -Start valsartan 20 mg daily  -Start folic acid and thiamine    Medications that were changed  -Decrease carvedilol to 6.25 mg twice a day  -Increase lactulose to 3 times a day for goal bowel meant of 2-3    Medications that were stop  -Losartan

## 2024-10-13 LAB
HCT VFR BLD AUTO: 25.3 % (ref 35.8–46.3)
HGB BLD-MCNC: 8 G/DL (ref 11.7–15.4)

## 2024-10-13 PROCEDURE — 6360000002 HC RX W HCPCS: Performed by: FAMILY MEDICINE

## 2024-10-13 PROCEDURE — 85014 HEMATOCRIT: CPT

## 2024-10-13 PROCEDURE — 94761 N-INVAS EAR/PLS OXIMETRY MLT: CPT

## 2024-10-13 PROCEDURE — 2580000003 HC RX 258

## 2024-10-13 PROCEDURE — 94640 AIRWAY INHALATION TREATMENT: CPT

## 2024-10-13 PROCEDURE — 2580000003 HC RX 258: Performed by: FAMILY MEDICINE

## 2024-10-13 PROCEDURE — 2060000000 HC ICU INTERMEDIATE R&B

## 2024-10-13 PROCEDURE — 6360000002 HC RX W HCPCS: Performed by: INTERNAL MEDICINE

## 2024-10-13 PROCEDURE — 6360000002 HC RX W HCPCS

## 2024-10-13 PROCEDURE — 36415 COLL VENOUS BLD VENIPUNCTURE: CPT

## 2024-10-13 PROCEDURE — 6370000000 HC RX 637 (ALT 250 FOR IP)

## 2024-10-13 PROCEDURE — 6370000000 HC RX 637 (ALT 250 FOR IP): Performed by: INTERNAL MEDICINE

## 2024-10-13 PROCEDURE — 85018 HEMOGLOBIN: CPT

## 2024-10-13 RX ADMIN — BUDESONIDE INHALATION 500 MCG: 0.5 SUSPENSION RESPIRATORY (INHALATION) at 18:54

## 2024-10-13 RX ADMIN — VALSARTAN 20 MG: 40 TABLET, FILM COATED ORAL at 08:58

## 2024-10-13 RX ADMIN — LACTULOSE 10 G: 10 SOLUTION ORAL at 08:58

## 2024-10-13 RX ADMIN — ARFORMOTEROL TARTRATE 15 MCG: 15 SOLUTION RESPIRATORY (INHALATION) at 18:54

## 2024-10-13 RX ADMIN — ARFORMOTEROL TARTRATE 15 MCG: 15 SOLUTION RESPIRATORY (INHALATION) at 09:25

## 2024-10-13 RX ADMIN — IPRATROPIUM BROMIDE 0.5 MG: 0.5 SOLUTION RESPIRATORY (INHALATION) at 09:25

## 2024-10-13 RX ADMIN — LEVETIRACETAM 750 MG: 500 TABLET, FILM COATED ORAL at 08:58

## 2024-10-13 RX ADMIN — LACTULOSE 10 G: 10 SOLUTION ORAL at 21:09

## 2024-10-13 RX ADMIN — OCTREOTIDE ACETATE 25 MCG/HR: 500 INJECTION, SOLUTION INTRAVENOUS; SUBCUTANEOUS at 05:31

## 2024-10-13 RX ADMIN — AMLODIPINE BESYLATE 10 MG: 10 TABLET ORAL at 08:58

## 2024-10-13 RX ADMIN — FOLIC ACID 1 MG: 1 TABLET ORAL at 08:58

## 2024-10-13 RX ADMIN — LACTULOSE 10 G: 10 SOLUTION ORAL at 13:21

## 2024-10-13 RX ADMIN — LEVETIRACETAM 750 MG: 500 TABLET, FILM COATED ORAL at 21:08

## 2024-10-13 RX ADMIN — BUDESONIDE INHALATION 500 MCG: 0.5 SUSPENSION RESPIRATORY (INHALATION) at 09:25

## 2024-10-13 RX ADMIN — IPRATROPIUM BROMIDE 0.5 MG: 0.5 SOLUTION RESPIRATORY (INHALATION) at 18:54

## 2024-10-13 RX ADMIN — SODIUM CHLORIDE, PRESERVATIVE FREE 40 MG: 5 INJECTION INTRAVENOUS at 00:56

## 2024-10-13 RX ADMIN — THIAMINE HYDROCHLORIDE 100 MG: 100 INJECTION, SOLUTION INTRAMUSCULAR; INTRAVENOUS at 08:58

## 2024-10-13 RX ADMIN — SODIUM CHLORIDE, PRESERVATIVE FREE 10 ML: 5 INJECTION INTRAVENOUS at 21:10

## 2024-10-13 RX ADMIN — SODIUM CHLORIDE, PRESERVATIVE FREE 40 MG: 5 INJECTION INTRAVENOUS at 13:21

## 2024-10-13 RX ADMIN — WATER 1000 MG: 1 INJECTION INTRAMUSCULAR; INTRAVENOUS; SUBCUTANEOUS at 13:20

## 2024-10-13 NOTE — CARE COORDINATION
CM met with pt/significant other at the bedside to obtain STR choices.  Pt sleeping and would not respond to CM.  Significant other does not know if she reviewed the list or made any choices. Weekday CM notified for follow up.

## 2024-10-14 LAB — AMMONIA PLAS-SCNC: 71 UMOL/L (ref 11–51)

## 2024-10-14 PROCEDURE — 97535 SELF CARE MNGMENT TRAINING: CPT

## 2024-10-14 PROCEDURE — 6360000002 HC RX W HCPCS

## 2024-10-14 PROCEDURE — 97530 THERAPEUTIC ACTIVITIES: CPT

## 2024-10-14 PROCEDURE — 6360000002 HC RX W HCPCS: Performed by: INTERNAL MEDICINE

## 2024-10-14 PROCEDURE — 2060000000 HC ICU INTERMEDIATE R&B

## 2024-10-14 PROCEDURE — 94640 AIRWAY INHALATION TREATMENT: CPT

## 2024-10-14 PROCEDURE — 2580000003 HC RX 258

## 2024-10-14 PROCEDURE — 36415 COLL VENOUS BLD VENIPUNCTURE: CPT

## 2024-10-14 PROCEDURE — 6370000000 HC RX 637 (ALT 250 FOR IP): Performed by: INTERNAL MEDICINE

## 2024-10-14 PROCEDURE — 94760 N-INVAS EAR/PLS OXIMETRY 1: CPT

## 2024-10-14 PROCEDURE — 6360000002 HC RX W HCPCS: Performed by: FAMILY MEDICINE

## 2024-10-14 PROCEDURE — 2580000003 HC RX 258: Performed by: FAMILY MEDICINE

## 2024-10-14 PROCEDURE — 82140 ASSAY OF AMMONIA: CPT

## 2024-10-14 PROCEDURE — 94761 N-INVAS EAR/PLS OXIMETRY MLT: CPT

## 2024-10-14 PROCEDURE — 6370000000 HC RX 637 (ALT 250 FOR IP)

## 2024-10-14 RX ADMIN — IPRATROPIUM BROMIDE 0.5 MG: 0.5 SOLUTION RESPIRATORY (INHALATION) at 20:17

## 2024-10-14 RX ADMIN — OCTREOTIDE ACETATE 25 MCG/HR: 500 INJECTION, SOLUTION INTRAVENOUS; SUBCUTANEOUS at 02:25

## 2024-10-14 RX ADMIN — BUDESONIDE INHALATION 500 MCG: 0.5 SUSPENSION RESPIRATORY (INHALATION) at 08:33

## 2024-10-14 RX ADMIN — SODIUM CHLORIDE, PRESERVATIVE FREE 40 MG: 5 INJECTION INTRAVENOUS at 12:27

## 2024-10-14 RX ADMIN — SODIUM CHLORIDE, PRESERVATIVE FREE 10 ML: 5 INJECTION INTRAVENOUS at 07:55

## 2024-10-14 RX ADMIN — LACTULOSE 10 G: 10 SOLUTION ORAL at 20:59

## 2024-10-14 RX ADMIN — HYDRALAZINE HYDROCHLORIDE 10 MG: 20 INJECTION INTRAMUSCULAR; INTRAVENOUS at 16:36

## 2024-10-14 RX ADMIN — SODIUM CHLORIDE, PRESERVATIVE FREE 10 ML: 5 INJECTION INTRAVENOUS at 21:03

## 2024-10-14 RX ADMIN — FOLIC ACID 1 MG: 1 TABLET ORAL at 07:54

## 2024-10-14 RX ADMIN — LEVETIRACETAM 750 MG: 500 TABLET, FILM COATED ORAL at 20:59

## 2024-10-14 RX ADMIN — LEVETIRACETAM 750 MG: 500 TABLET, FILM COATED ORAL at 07:52

## 2024-10-14 RX ADMIN — ARFORMOTEROL TARTRATE 15 MCG: 15 SOLUTION RESPIRATORY (INHALATION) at 20:17

## 2024-10-14 RX ADMIN — THIAMINE HYDROCHLORIDE 100 MG: 100 INJECTION, SOLUTION INTRAMUSCULAR; INTRAVENOUS at 07:52

## 2024-10-14 RX ADMIN — SODIUM CHLORIDE, PRESERVATIVE FREE 40 MG: 5 INJECTION INTRAVENOUS at 23:55

## 2024-10-14 RX ADMIN — VALSARTAN 20 MG: 40 TABLET, FILM COATED ORAL at 07:54

## 2024-10-14 RX ADMIN — SODIUM CHLORIDE, PRESERVATIVE FREE 40 MG: 5 INJECTION INTRAVENOUS at 00:27

## 2024-10-14 RX ADMIN — WATER 1000 MG: 1 INJECTION INTRAMUSCULAR; INTRAVENOUS; SUBCUTANEOUS at 12:27

## 2024-10-14 RX ADMIN — AMLODIPINE BESYLATE 10 MG: 10 TABLET ORAL at 07:54

## 2024-10-14 RX ADMIN — ARFORMOTEROL TARTRATE 15 MCG: 15 SOLUTION RESPIRATORY (INHALATION) at 08:34

## 2024-10-14 RX ADMIN — LACTULOSE 10 G: 10 SOLUTION ORAL at 07:53

## 2024-10-14 RX ADMIN — LACTULOSE 10 G: 10 SOLUTION ORAL at 13:59

## 2024-10-14 RX ADMIN — OCTREOTIDE ACETATE 25 MCG/HR: 500 INJECTION, SOLUTION INTRAVENOUS; SUBCUTANEOUS at 23:57

## 2024-10-14 RX ADMIN — IPRATROPIUM BROMIDE 0.5 MG: 0.5 SOLUTION RESPIRATORY (INHALATION) at 08:33

## 2024-10-14 RX ADMIN — BUDESONIDE INHALATION 500 MCG: 0.5 SUSPENSION RESPIRATORY (INHALATION) at 20:17

## 2024-10-14 ASSESSMENT — PAIN SCALES - GENERAL
PAINLEVEL_OUTOF10: 0

## 2024-10-14 ASSESSMENT — PULMONARY FUNCTION TESTS: PEFR_L/MIN: 16

## 2024-10-14 NOTE — PLAN OF CARE
Problem: Discharge Planning  Goal: Discharge to home or other facility with appropriate resources  10/14/2024 1120 by Kelli Garcia, RN  Outcome: Progressing  Flowsheets (Taken 10/14/2024 0751)  Discharge to home or other facility with appropriate resources: Identify barriers to discharge with patient and caregiver  10/13/2024 2252 by Marcus Toledo, RN  Outcome: Progressing  Flowsheets (Taken 10/13/2024 2252)  Discharge to home or other facility with appropriate resources:   Arrange for needed discharge resources and transportation as appropriate   Identify discharge learning needs (meds, wound care, etc)   Arrange for interpreters to assist at discharge as needed   Refer to discharge planning if patient needs post-hospital services based on physician order or complex needs related to functional status, cognitive ability or social support system     Problem: Pain  Goal: Verbalizes/displays adequate comfort level or baseline comfort level  Outcome: Progressing  Flowsheets (Taken 10/14/2024 0751)  Verbalizes/displays adequate comfort level or baseline comfort level:   Encourage patient to monitor pain and request assistance   Administer analgesics based on type and severity of pain and evaluate response   Assess pain using appropriate pain scale   Implement non-pharmacological measures as appropriate and evaluate response   Consider cultural and social influences on pain and pain management   Notify Licensed Independent Practitioner if interventions unsuccessful or patient reports new pain     Problem: Safety - Adult  Goal: Free from fall injury  Outcome: Progressing  Flowsheets (Taken 10/14/2024 0751)  Free From Fall Injury: Instruct family/caregiver on patient safety     Problem: ABCDS Injury Assessment  Goal: Absence of physical injury  Outcome: Progressing     Problem: Respiratory - Adult  Goal: Achieves optimal ventilation and oxygenation  Outcome: Progressing  Flowsheets (Taken 10/14/2024 0751)  Achieves

## 2024-10-14 NOTE — PLAN OF CARE
Problem: Discharge Planning  Goal: Discharge to home or other facility with appropriate resources  Outcome: Progressing  Flowsheets (Taken 10/13/2024 6620)  Discharge to home or other facility with appropriate resources:   Arrange for needed discharge resources and transportation as appropriate   Identify discharge learning needs (meds, wound care, etc)   Arrange for interpreters to assist at discharge as needed   Refer to discharge planning if patient needs post-hospital services based on physician order or complex needs related to functional status, cognitive ability or social support system

## 2024-10-14 NOTE — CARE COORDINATION
Pt chart reviewed. Therapy recommends SNF for pt, MSW met with pt and daughter to discuss placement. MSW provided a list of facilities for review. MSW to follow up with decision to discharge with SNF vs HH. No other needs expressed at this time for case management. Discharge to be determined. MSW will follow patient for care.

## 2024-10-15 PROCEDURE — 6370000000 HC RX 637 (ALT 250 FOR IP)

## 2024-10-15 PROCEDURE — 2060000000 HC ICU INTERMEDIATE R&B

## 2024-10-15 PROCEDURE — 6360000002 HC RX W HCPCS: Performed by: INTERNAL MEDICINE

## 2024-10-15 PROCEDURE — 6370000000 HC RX 637 (ALT 250 FOR IP): Performed by: INTERNAL MEDICINE

## 2024-10-15 PROCEDURE — 2580000003 HC RX 258

## 2024-10-15 PROCEDURE — 6360000002 HC RX W HCPCS: Performed by: FAMILY MEDICINE

## 2024-10-15 PROCEDURE — 2580000003 HC RX 258: Performed by: FAMILY MEDICINE

## 2024-10-15 PROCEDURE — 6360000002 HC RX W HCPCS

## 2024-10-15 PROCEDURE — 97530 THERAPEUTIC ACTIVITIES: CPT

## 2024-10-15 PROCEDURE — 94761 N-INVAS EAR/PLS OXIMETRY MLT: CPT

## 2024-10-15 PROCEDURE — 94640 AIRWAY INHALATION TREATMENT: CPT

## 2024-10-15 PROCEDURE — 94760 N-INVAS EAR/PLS OXIMETRY 1: CPT

## 2024-10-15 RX ORDER — PANTOPRAZOLE SODIUM 40 MG/1
40 TABLET, DELAYED RELEASE ORAL
Status: DISCONTINUED | OUTPATIENT
Start: 2024-10-15 | End: 2024-10-16 | Stop reason: HOSPADM

## 2024-10-15 RX ORDER — ASPIRIN 81 MG/1
81 TABLET, CHEWABLE ORAL DAILY
Status: DISCONTINUED | OUTPATIENT
Start: 2024-10-15 | End: 2024-10-15

## 2024-10-15 RX ORDER — LANOLIN ALCOHOL/MO/W.PET/CERES
100 CREAM (GRAM) TOPICAL DAILY
Status: DISCONTINUED | OUTPATIENT
Start: 2024-10-16 | End: 2024-10-16 | Stop reason: HOSPADM

## 2024-10-15 RX ORDER — CARVEDILOL 6.25 MG/1
6.25 TABLET ORAL 2 TIMES DAILY WITH MEALS
Status: DISCONTINUED | OUTPATIENT
Start: 2024-10-15 | End: 2024-10-16 | Stop reason: HOSPADM

## 2024-10-15 RX ADMIN — IPRATROPIUM BROMIDE 0.5 MG: 0.5 SOLUTION RESPIRATORY (INHALATION) at 09:04

## 2024-10-15 RX ADMIN — FOLIC ACID 1 MG: 1 TABLET ORAL at 10:00

## 2024-10-15 RX ADMIN — VALSARTAN 20 MG: 40 TABLET, FILM COATED ORAL at 10:00

## 2024-10-15 RX ADMIN — WATER 1000 MG: 1 INJECTION INTRAMUSCULAR; INTRAVENOUS; SUBCUTANEOUS at 12:49

## 2024-10-15 RX ADMIN — CARVEDILOL 6.25 MG: 6.25 TABLET, FILM COATED ORAL at 18:29

## 2024-10-15 RX ADMIN — PANTOPRAZOLE SODIUM 40 MG: 40 TABLET, DELAYED RELEASE ORAL at 18:29

## 2024-10-15 RX ADMIN — LACTULOSE 10 G: 10 SOLUTION ORAL at 20:32

## 2024-10-15 RX ADMIN — AMLODIPINE BESYLATE 10 MG: 10 TABLET ORAL at 10:00

## 2024-10-15 RX ADMIN — LEVETIRACETAM 750 MG: 500 TABLET, FILM COATED ORAL at 10:00

## 2024-10-15 RX ADMIN — BUDESONIDE INHALATION 500 MCG: 0.5 SUSPENSION RESPIRATORY (INHALATION) at 09:04

## 2024-10-15 RX ADMIN — THIAMINE HYDROCHLORIDE 100 MG: 100 INJECTION, SOLUTION INTRAMUSCULAR; INTRAVENOUS at 10:00

## 2024-10-15 RX ADMIN — ARFORMOTEROL TARTRATE 15 MCG: 15 SOLUTION RESPIRATORY (INHALATION) at 09:04

## 2024-10-15 RX ADMIN — IPRATROPIUM BROMIDE 0.5 MG: 0.5 SOLUTION RESPIRATORY (INHALATION) at 19:55

## 2024-10-15 RX ADMIN — SODIUM CHLORIDE, PRESERVATIVE FREE 10 ML: 5 INJECTION INTRAVENOUS at 10:08

## 2024-10-15 RX ADMIN — BUDESONIDE INHALATION 500 MCG: 0.5 SUSPENSION RESPIRATORY (INHALATION) at 19:55

## 2024-10-15 RX ADMIN — LEVETIRACETAM 750 MG: 500 TABLET, FILM COATED ORAL at 20:33

## 2024-10-15 RX ADMIN — SODIUM CHLORIDE, PRESERVATIVE FREE 10 ML: 5 INJECTION INTRAVENOUS at 20:34

## 2024-10-15 RX ADMIN — LACTULOSE 10 G: 10 SOLUTION ORAL at 13:56

## 2024-10-15 RX ADMIN — ARFORMOTEROL TARTRATE 15 MCG: 15 SOLUTION RESPIRATORY (INHALATION) at 19:55

## 2024-10-15 RX ADMIN — SODIUM CHLORIDE, PRESERVATIVE FREE 40 MG: 5 INJECTION INTRAVENOUS at 12:50

## 2024-10-15 RX ADMIN — LACTULOSE 10 G: 10 SOLUTION ORAL at 10:00

## 2024-10-15 ASSESSMENT — PAIN SCALES - GENERAL
PAINLEVEL_OUTOF10: 0

## 2024-10-15 NOTE — PLAN OF CARE
Problem: Respiratory - Adult  Goal: Achieves optimal ventilation and oxygenation  Outcome: Progressing  Flowsheets (Taken 10/15/2024 0907)  Achieves optimal ventilation and oxygenation:   Assess for changes in mentation and behavior   Assess for changes in respiratory status   Oxygen supplementation based on oxygen saturation or arterial blood gases   Position to facilitate oxygenation and minimize respiratory effort   Assess and instruct to report shortness of breath or any respiratory difficulty   Respiratory therapy support as indicated   Encourage broncho-pulmonary hygiene including cough, deep breathe, incentive spirometry

## 2024-10-15 NOTE — PLAN OF CARE
Problem: Discharge Planning  Goal: Discharge to home or other facility with appropriate resources  10/15/2024 0108 by Marcus Toledo RN  Outcome: Progressing  Flowsheets (Taken 10/15/2024 0108)  Discharge to home or other facility with appropriate resources:   Identify barriers to discharge with patient and caregiver   Identify discharge learning needs (meds, wound care, etc)   Arrange for interpreters to assist at discharge as needed  10/14/2024 1120 by Kelli Garcia RN  Outcome: Progressing  Flowsheets (Taken 10/14/2024 0751)  Discharge to home or other facility with appropriate resources: Identify barriers to discharge with patient and caregiver     Problem: Pain  Goal: Verbalizes/displays adequate comfort level or baseline comfort level  Recent Flowsheet Documentation  Taken 10/14/2024 1612 by Kelli Garcia, RN  Verbalizes/displays adequate comfort level or baseline comfort level:   Encourage patient to monitor pain and request assistance   Assess pain using appropriate pain scale   Administer analgesics based on type and severity of pain and evaluate response   Implement non-pharmacological measures as appropriate and evaluate response   Consider cultural and social influences on pain and pain management   Notify Licensed Independent Practitioner if interventions unsuccessful or patient reports new pain  Taken 10/14/2024 1201 by Kelli Garcia, RN  Verbalizes/displays adequate comfort level or baseline comfort level:   Encourage patient to monitor pain and request assistance   Assess pain using appropriate pain scale   Administer analgesics based on type and severity of pain and evaluate response   Implement non-pharmacological measures as appropriate and evaluate response   Consider cultural and social influences on pain and pain management   Notify Licensed Independent Practitioner if interventions unsuccessful or patient reports new pain  10/14/2024 1120 by Kelli Garcia RN  Outcome:

## 2024-10-16 VITALS
DIASTOLIC BLOOD PRESSURE: 71 MMHG | HEIGHT: 65 IN | OXYGEN SATURATION: 97 % | RESPIRATION RATE: 15 BRPM | HEART RATE: 62 BPM | BODY MASS INDEX: 23.47 KG/M2 | WEIGHT: 140.9 LBS | SYSTOLIC BLOOD PRESSURE: 124 MMHG | TEMPERATURE: 97.5 F

## 2024-10-16 PROBLEM — K76.82 HEPATIC ENCEPHALOPATHY (HCC): Status: RESOLVED | Noted: 2024-10-10 | Resolved: 2024-10-16

## 2024-10-16 PROBLEM — K92.1 MELENA: Status: RESOLVED | Noted: 2024-10-09 | Resolved: 2024-10-16

## 2024-10-16 PROBLEM — R41.82 ALTERED MENTAL STATUS: Status: RESOLVED | Noted: 2024-10-10 | Resolved: 2024-10-16

## 2024-10-16 PROBLEM — K92.2 UGI BLEED: Status: RESOLVED | Noted: 2024-10-10 | Resolved: 2024-10-16

## 2024-10-16 PROBLEM — I16.1 HYPERTENSIVE EMERGENCY: Status: RESOLVED | Noted: 2022-10-27 | Resolved: 2024-10-16

## 2024-10-16 PROBLEM — I16.0 HYPERTENSIVE URGENCY: Status: RESOLVED | Noted: 2022-10-27 | Resolved: 2024-10-16

## 2024-10-16 PROBLEM — K70.30 ALCOHOLIC CIRRHOSIS (HCC): Status: ACTIVE | Noted: 2024-10-16

## 2024-10-16 PROCEDURE — 94640 AIRWAY INHALATION TREATMENT: CPT

## 2024-10-16 PROCEDURE — 6360000002 HC RX W HCPCS: Performed by: INTERNAL MEDICINE

## 2024-10-16 PROCEDURE — 6360000002 HC RX W HCPCS

## 2024-10-16 PROCEDURE — 6370000000 HC RX 637 (ALT 250 FOR IP)

## 2024-10-16 PROCEDURE — 94760 N-INVAS EAR/PLS OXIMETRY 1: CPT

## 2024-10-16 PROCEDURE — 97535 SELF CARE MNGMENT TRAINING: CPT

## 2024-10-16 PROCEDURE — 2580000003 HC RX 258: Performed by: FAMILY MEDICINE

## 2024-10-16 PROCEDURE — 6360000002 HC RX W HCPCS: Performed by: FAMILY MEDICINE

## 2024-10-16 PROCEDURE — 2580000003 HC RX 258

## 2024-10-16 PROCEDURE — 6370000000 HC RX 637 (ALT 250 FOR IP): Performed by: INTERNAL MEDICINE

## 2024-10-16 RX ORDER — LACTULOSE 10 G/15ML
10 SOLUTION ORAL 3 TIMES DAILY
Qty: 237 ML | Refills: 1 | Status: SHIPPED | OUTPATIENT
Start: 2024-10-16

## 2024-10-16 RX ORDER — LANOLIN ALCOHOL/MO/W.PET/CERES
100 CREAM (GRAM) TOPICAL DAILY
Qty: 30 TABLET | Refills: 3 | Status: SHIPPED | OUTPATIENT
Start: 2024-10-17

## 2024-10-16 RX ORDER — VALSARTAN 40 MG/1
20 TABLET ORAL DAILY
Qty: 30 TABLET | Refills: 3 | Status: SHIPPED | OUTPATIENT
Start: 2024-10-17

## 2024-10-16 RX ORDER — CARVEDILOL 6.25 MG/1
6.25 TABLET ORAL 2 TIMES DAILY WITH MEALS
Qty: 60 TABLET | Refills: 3 | Status: SHIPPED | OUTPATIENT
Start: 2024-10-16

## 2024-10-16 RX ADMIN — VALSARTAN 20 MG: 40 TABLET, FILM COATED ORAL at 08:06

## 2024-10-16 RX ADMIN — WATER 1000 MG: 1 INJECTION INTRAMUSCULAR; INTRAVENOUS; SUBCUTANEOUS at 12:33

## 2024-10-16 RX ADMIN — FOLIC ACID 1 MG: 1 TABLET ORAL at 08:07

## 2024-10-16 RX ADMIN — BUDESONIDE INHALATION 500 MCG: 0.5 SUSPENSION RESPIRATORY (INHALATION) at 07:24

## 2024-10-16 RX ADMIN — Medication 100 MG: at 08:06

## 2024-10-16 RX ADMIN — SODIUM CHLORIDE, PRESERVATIVE FREE 10 ML: 5 INJECTION INTRAVENOUS at 08:09

## 2024-10-16 RX ADMIN — PANTOPRAZOLE SODIUM 40 MG: 40 TABLET, DELAYED RELEASE ORAL at 08:07

## 2024-10-16 RX ADMIN — PANTOPRAZOLE SODIUM 40 MG: 40 TABLET, DELAYED RELEASE ORAL at 16:31

## 2024-10-16 RX ADMIN — LEVETIRACETAM 750 MG: 500 TABLET, FILM COATED ORAL at 08:06

## 2024-10-16 RX ADMIN — CARVEDILOL 6.25 MG: 6.25 TABLET, FILM COATED ORAL at 16:31

## 2024-10-16 RX ADMIN — CARVEDILOL 6.25 MG: 6.25 TABLET, FILM COATED ORAL at 08:06

## 2024-10-16 RX ADMIN — AMLODIPINE BESYLATE 10 MG: 10 TABLET ORAL at 08:06

## 2024-10-16 RX ADMIN — ARFORMOTEROL TARTRATE 15 MCG: 15 SOLUTION RESPIRATORY (INHALATION) at 07:24

## 2024-10-16 RX ADMIN — IPRATROPIUM BROMIDE 0.5 MG: 0.5 SOLUTION RESPIRATORY (INHALATION) at 07:24

## 2024-10-16 RX ADMIN — LACTULOSE 10 G: 10 SOLUTION ORAL at 08:07

## 2024-10-16 RX ADMIN — LACTULOSE 10 G: 10 SOLUTION ORAL at 14:09

## 2024-10-16 ASSESSMENT — PAIN SCALES - GENERAL
PAINLEVEL_OUTOF10: 0
PAINLEVEL_OUTOF10: 0

## 2024-10-16 NOTE — CARE COORDINATION
CM informed in IDR that patient is medically stable for discharge. CM met with patient. Patient verified she lives alone in an apartment. CM inquired if patient remembered how she was found. Patient states she just remembers hearing people call her name. CM inquired how often patient sees her family or neighbors. Patient reported \"when they come but I do stay at my daughter's on the weekend\". Patient reported she could stay at her daughter's for awhile until she's she stronger.   CM contacted patient's daughter, Yarely Daniels [phone]. Yarely states she is agreeable to patient staying with her but \"not sure how long she'll stay\".   CM offered home health and both patient and family are agreeable. Yarely provided home address:  [address] [address].  CM placed home health order and sent to [facility]. Rafaela with [facility] accepted referral. CM selected.   Therapy recommending RW. Patient denies preference of DME company. CM provided RW from [facility] and placed in patient's room.   Yarely to transport patient to her home.

## 2024-10-16 NOTE — PLAN OF CARE
Problem: Discharge Planning  Goal: Discharge to home or other facility with appropriate resources  Outcome: Completed     Problem: Pain  Goal: Verbalizes/displays adequate comfort level or baseline comfort level  Outcome: Completed     Problem: Safety - Adult  Goal: Free from fall injury  Outcome: Completed     Problem: ABCDS Injury Assessment  Goal: Absence of physical injury  Outcome: Completed     Problem: Respiratory - Adult  Goal: Achieves optimal ventilation and oxygenation  10/16/2024 1536 by Lakisha Ortiz RN  Outcome: Completed  10/16/2024 0725 by Jerad Larry RCP  Outcome: Progressing  Flowsheets (Taken 10/16/2024 0725)  Achieves optimal ventilation and oxygenation:   Assess for changes in respiratory status   Position to facilitate oxygenation and minimize respiratory effort   Respiratory therapy support as indicated   Assess for changes in mentation and behavior   Oxygen supplementation based on oxygen saturation or arterial blood gases   Encourage broncho-pulmonary hygiene including cough, deep breathe, incentive spirometry   Assess and instruct to report shortness of breath or any respiratory difficulty     Problem: Skin/Tissue Integrity  Goal: Absence of new skin breakdown  Description: 1.  Monitor for areas of redness and/or skin breakdown  2.  Assess vascular access sites hourly  3.  Every 4-6 hours minimum:  Change oxygen saturation probe site  4.  Every 4-6 hours:  If on nasal continuous positive airway pressure, respiratory therapy assess nares and determine need for appliance change or resting period.  Outcome: Completed

## 2024-10-16 NOTE — PROGRESS NOTES
Hospitalist Progress Note   Admit Date:  10/9/2024 10:03 PM   Name:  Kerri Daniels   Age:  64 y.o.  Sex:  female  :  1960   MRN:  [id number]   Room:  [facility]/    Presenting/Chief Complaint: Altered Mental Status     Reason(s) for Admission: Hyperammonemia (HCC) [E72.20]  Altered mental status [R41.82]  Anemia due to acute blood loss [D62]  Hypertensive urgency [I16.0]  Uncomplicated alcohol dependence (HCC) [F10.20]  Gastrointestinal hemorrhage with melena [K92.1]  Altered mental status, unspecified altered mental status type [R41.82]  Cerebrovascular accident (CVA), unspecified mechanism (HCC) [I63.9]  Alcoholic cirrhosis, unspecified whether ascites present (HCC) [K70.30]     Hospital Course:   Kerri Daniels is a 64 y.o. female with medical history of   Hepatitis C  Alcohol abuse   Tobacco use   Gastric varices      who presented after neighbors found her at home covered in urine and feces and not acting herself.     Daughter stated that patient drank alcohol every day. Patient was confused upon presentation at ER.     She was found to have high ammonia,   Patient has large bowel movement in ER. That was positive for blood.     Head CT shows interval infarction in the right occipital lobe.     BP was high on admission.       Subjective & 24hr Events:     10/11/24   Patient went for EGD today.   No new complaints.   She is eating liquid diet OK.   No abdominal pain.   No shortness of breath.   No fever. No shaking. No chills.     10/12/24   Patient had EGD which showed \" Significant for PHG, this is likely the source of ongoing anemia and blood loss. No active bleeding seen on exam. See procedure report for complete findings. \"   Patient is eating breakfast well.   Afebrile.   No chest pain.   No shortness of breath.      10/13/24   Patient is resting well.   Afebrile.   No chest pain.   No shortness of breath.        Assessment & Plan:     Principal Problem:    Hypertensive urgency    Hypertensive 
       Hospitalist Progress Note   Admit Date:  10/9/2024 10:03 PM   Name:  Kerri Daniels   Age:  64 y.o.  Sex:  female  :  1960   MRN:  [id number]   [facility]  [facility]    Presenting/Chief Complaint: Altered Mental Status     Reason(s) for Admission: Hyperammonemia (HCC) [E72.20]  Altered mental status [R41.82]  Anemia due to acute blood loss [D62]  Hypertensive urgency [I16.0]  Uncomplicated alcohol dependence (HCC) [F10.20]  Gastrointestinal hemorrhage with melena [K92.1]  Altered mental status, unspecified altered mental status type [R41.82]  Cerebrovascular accident (CVA), unspecified mechanism (HCC) [I63.9]  Alcoholic cirrhosis, unspecified whether ascites present (HCC) [K70.30]     Hospital Course:   Kerri Daniels is a 64 y.o. female with medical history of   Hepatitis C  Alcohol abuse   Tobacco use   Gastric varices      who presented after neighbors found her at home covered in urine and feces and not acting herself.     Daughter stated that patient drank alcohol every day. Patient was confused upon presentation at ER.     She was found to have high ammonia,   Patient has large bowel movement in ER. That was positive for blood.     Head CT shows interval infarction in the right occipital lobe.     BP was high on admission.       Subjective & 24hr Events:     10/11/24   Patient went for EGD today.   No new complaints.   She is eating liquid diet OK.   No abdominal pain.   No shortness of breath.   No fever. No shaking. No chills.     10/12/24   Patient had EGD which showed \" Significant for PHG, this is likely the source of ongoing anemia and blood loss. No active bleeding seen on exam. See procedure report for complete findings. \"   Patient is eating breakfast well.   Afebrile.   No chest pain.   No shortness of breath.      Assessment & Plan:     Principal Problem:    Hypertensive urgency    Hypertensive emergency  Plan:   Patient is on Amlodipine 10 mg po q day.   BP has improved.   Patient is started on 
       Hospitalist Progress Note   Admit Date:  10/9/2024 10:03 PM   Name:  Kerri Daniels   Age:  64 y.o.  Sex:  female  :  1960   MRN:  [id number]   Room:  [facility]/    Presenting/Chief Complaint: Altered Mental Status     Reason(s) for Admission: Hyperammonemia (HCC) [E72.20]  Altered mental status [R41.82]  Anemia due to acute blood loss [D62]  Hypertensive urgency [I16.0]  Uncomplicated alcohol dependence (HCC) [F10.20]  Gastrointestinal hemorrhage with melena [K92.1]  Altered mental status, unspecified altered mental status type [R41.82]  Cerebrovascular accident (CVA), unspecified mechanism (HCC) [I63.9]  Alcoholic cirrhosis, unspecified whether ascites present (HCC) [K70.30]     Hospital Course:   Kerri Daniels is a 64 y.o. female with medical history of   Hepatitis C  Alcohol abuse   Tobacco use   Gastric varices      who presented after neighbors found her at home covered in urine and feces and not acting herself.     Daughter stated that patient drank alcohol every day. Patient was confused upon presentation at ER.     She was found to have high ammonia,   Patient has large bowel movement in ER. That was positive for blood.     Head CT shows interval infarction in the right occipital lobe.     BP was high on admission.       Subjective & 24hr Events:     10/11/24   Patient went for EGD today.   No new complaints.   She is eating liquid diet OK.   No abdominal pain.   No shortness of breath.   No fever. No shaking. No chills.       Assessment & Plan:     Principal Problem:    Hypertensive urgency    Hypertensive emergency  Plan:   Patient is on Amlodipine 10 mg po q day.   BP has improved.   I will give Valsartan.   Monitor closely.        Thrombocytopenia, unspecified (HCC)  Plan:   Monitor.       Iron deficiency anemia due to chronic blood loss  Plan:   No indication for blood transfusion thus far.       Hepatic encephalopathy (HCC)    UGI bleed    Hx of esophageal varices    Melena  Plan:   Patient is 
 visited with patient. Patient said she was doing well and was grateful to be seen. Patient's son said they had been well taken care of.  
4 Eyes Skin Assessment     NAME:  Kerri Daniels  YOB: 1960  MEDICAL RECORD NUMBER:  [id number]    The patient is being assessed for  Admission    I agree that at least one RN has performed a thorough Head to Toe Skin Assessment on the patient. ALL assessment sites listed below have been assessed.      Areas assessed by both nurses:    Head, Face, Ears, Shoulders, Back, Chest, Arms, Elbows, Hands, Sacrum. Buttock, Coccyx, Ischium, Legs. Feet and Heels, and Under Medical Devices         Does the Patient have a Wound? No noted wound(s)       Colby Prevention initiated by RN: No  Wound Care Orders initiated by RN: No    Pressure Injury (Stage 3,4, Unstageable, DTI, NWPT, and Complex wounds) if present, place Wound referral order by RN under : No    New Ostomies, if present place, Ostomy referral order under : No     Nurse 1 eSignature: Electronically signed by Cheyenne Meza RN on 10/10/24 at 2:02 PM EDT    **SHARE this note so that the co-signing nurse can place an eSignature**    Nurse 2 eSignature: Electronically signed by Lyla Bedoya RN on 10/10/24 at 3:53 PM EDT      
ACUTE OCCUPATIONAL THERAPY GOALS:   (Developed with and agreed upon by patient and/or caregiver.)  1. Pt will toilet with CGA   2. Pt will complete functional mobility for ADLs with CGA using AD as needed  3. Pt will complete lower body dressing with CGA using AE as needed  4. Pt will complete grooming and hygiene at sink with CGA  5. Pt will tolerate 23 minutes functional activity with min or fewer rest breaks to promote increased endurance for ADLs    Timeframe: 7 visits      OCCUPATIONAL THERAPY Initial Assessment and Daily Note       OT Visit Days: 1  Acknowledge Orders  Time  OT Charge Capture  Rehab Caseload Tracker      Kerri Daniels is a 64 y.o. female   PRIMARY DIAGNOSIS: Hypertensive urgency  Hyperammonemia (HCC) [E72.20]  Altered mental status [R41.82]  Anemia due to acute blood loss [D62]  Hypertensive urgency [I16.0]  Uncomplicated alcohol dependence (HCC) [F10.20]  Gastrointestinal hemorrhage with melena [K92.1]  Altered mental status, unspecified altered mental status type [R41.82]  Cerebrovascular accident (CVA), unspecified mechanism (HCC) [I63.9]  Alcoholic cirrhosis, unspecified whether ascites present (HCC) [K70.30]  Procedure(s) (LRB):  ESOPHAGOGASTRODUODENOSCOPY (N/A)  Day of Surgery  Reason for Referral: Generalized Muscle Weakness (M62.81)  Inpatient: Payor: [employer] [place] MEDICA[place] / Plan: [id number] / Product Type: *No Product type* /     ASSESSMENT:     REHAB RECOMMENDATIONS:   Recommendation to date pending progress:  Setting:  Short-term Rehab    Equipment:    To Be Determined     ASSESSMENT:  Ms. Daniels was admitted with AMS after she was found at home naked and covered with feces and urine. Pt presented with deficits in cognition, mobility, balance, and activity tolerance impacting ADLs. Pt confused with impaired memory and delayed processing, required mod-max multi modal cues and extra time to initiate and complete all functional tasks. Pt required min x2 to 
ACUTE OCCUPATIONAL THERAPY GOALS:   (Developed with and agreed upon by patient and/or caregiver.)  1. Pt will toilet with CGA   2. Pt will complete functional mobility for ADLs with CGA using AD as needed (met)  3. Pt will complete lower body dressing with CGA using AE as needed (Met)  4. Pt will complete grooming and hygiene at sink with CGA (met)  5. Pt will tolerate 23 minutes functional activity with min or fewer rest breaks to promote increased endurance for ADLs     Timeframe: 7 visits    OCCUPATIONAL THERAPY: Daily Note PM   OT Visit Days: 2   Time In/Out  OT Charge Capture  Rehab Caseload Tracker  OT Orders    Kerri Daniels is a 64 y.o. female   PRIMARY DIAGNOSIS: Hypertensive urgency  Hyperammonemia (HCC) [E72.20]  Altered mental status [R41.82]  Anemia due to acute blood loss [D62]  Hypertensive urgency [I16.0]  Uncomplicated alcohol dependence (HCC) [F10.20]  Gastrointestinal hemorrhage with melena [K92.1]  Altered mental status, unspecified altered mental status type [R41.82]  Cerebrovascular accident (CVA), unspecified mechanism (HCC) [I63.9]  Alcoholic cirrhosis, unspecified whether ascites present (HCC) [K70.30]  Procedure(s) (LRB):  ESOPHAGOGASTRODUODENOSCOPY (N/A)  3 Days Post-Op  Inpatient: Payor: [employer] [place] MEDICA[place] / [place] / Product Type: *No Product type* /     ASSESSMENT:     REHAB RECOMMENDATIONS:   Recommendation to date pending progress:  Setting:  Short-term Rehab    Equipment:    To Be Determined     ASSESSMENT:  Ms. Daniels remains limited by deficits in balance/ ataxia, mobility, activity tolerance, and cognition impacting ADLs. Cognition is improved overall, however with flat affect and continued deficits in safety and executive function. Pt required CGA overall for ADLs and for functional mobility for ADLs using rolling walker with frequent cues for overall safety / safe use of rolling walker. Pt is progressing towards goals, continue POC.  
ACUTE OCCUPATIONAL THERAPY GOALS:   (Developed with and agreed upon by patient and/or caregiver.)  1. Pt will toilet with CGA (MET)  2. Pt will complete functional mobility for ADLs with CGA using AD as needed (met)  3. Pt will complete lower body dressing with CGA using AE as needed (Met)  4. Pt will complete grooming and hygiene at sink with CGA (met)  5. Pt will tolerate 23 minutes functional activity with min or fewer rest breaks to promote increased endurance for ADLs (MET)  6. Pt will demonstrate improved safety to complete functional tasks w/o cues  7. Pt will independently verbalize 2+ energy conservation techniques to promote > endurance for ADLs     Timeframe: 7 visits    OCCUPATIONAL THERAPY: Daily Note PM   OT Visit Days: 3   Time In/Out  OT Charge Capture  Rehab Caseload Tracker  OT Orders    Kerri Daniels is a 64 y.o. female   PRIMARY DIAGNOSIS: Hypertensive urgency  Hyperammonemia (HCC) [E72.20]  Altered mental status [R41.82]  Anemia due to acute blood loss [D62]  Hypertensive urgency [I16.0]  Uncomplicated alcohol dependence (HCC) [F10.20]  Gastrointestinal hemorrhage with melena [K92.1]  Altered mental status, unspecified altered mental status type [R41.82]  Cerebrovascular accident (CVA), unspecified mechanism (HCC) [I63.9]  Alcoholic cirrhosis, unspecified whether ascites present (HCC) [K70.30]  Procedure(s) (LRB):  ESOPHAGOGASTRODUODENOSCOPY (N/A)  5 Days Post-Op  Inpatient: Payor: [facility] MEDICAID / Plan: [facility] MEDICAID / Product Type: *No Product type* /     ASSESSMENT:     REHAB RECOMMENDATIONS:   Recommendation to date pending progress:  Setting:  Short-term Rehab    Equipment:    To Be Determined     ASSESSMENT:  Ms. Daniels required SBA overall for ADLs and for functional mobility for ADLs using rolling walker. Pt continues to need frequent cues for overall safety / safe use of rolling walker. Pt has met goals, new goals added to address current deficits. 
ACUTE PHYSICAL THERAPY GOALS:   (Developed with and agreed upon by patient and/or caregiver.)  LTG's:  Patient will demonstrate sup<>sit transfer with I using no bedrails in 7 therapy sessions.  Patient will demonstrate STS transfer with I using B UE support and no device in 7 therapy sessions.  Patient will ambulate with SBA and LRAD x 150 ft in 7 therapy sessions.   Patient will demonstrate toilet transfer with MI using B UE support in 7 therapy sessions.  Patient will improve AMPAC score to 20 / 24 in 7 therapy sessions.    PHYSICAL THERAPY: Daily Note AM   (Link to Caseload Tracking: PT Visit Days : 2  Time In/Out PT Charge Capture  Rehab Caseload Tracker  Orders    Kerri Daniels is a 64 y.o. female   PRIMARY DIAGNOSIS: Hypertensive urgency  Hyperammonemia (HCC) [E72.20]  Altered mental status [R41.82]  Anemia due to acute blood loss [D62]  Hypertensive urgency [I16.0]  Uncomplicated alcohol dependence (HCC) [F10.20]  Gastrointestinal hemorrhage with melena [K92.1]  Altered mental status, unspecified altered mental status type [R41.82]  Cerebrovascular accident (CVA), unspecified mechanism (HCC) [I63.9]  Alcoholic cirrhosis, unspecified whether ascites present (HCC) [K70.30]  Procedure(s) (LRB):  ESOPHAGOGASTRODUODENOSCOPY (N/A)  3 Days Post-Op  Inpatient: Payor: [facility] MEDICAID / Plan: [facility] MEDICAID / Product Type: *No Product type* /     ASSESSMENT:     REHAB RECOMMENDATIONS:   Recommendation to date pending progress:  Setting:  Short-term Rehab    Equipment:    Rolling Walker     ASSESSMENT:  Ms. Daniels was supine in bed with spouse at the bedside.  She performed all mobility with close CGA with additional time and effort.  She was able to ambulate 50' pushing IV pole, but very unsteady in standing.  Patient is progressing well towards all goals, increased gait distance and required less assistance today.  Will continue working towards goals..     SUBJECTIVE:   Ms. Daniels 
IP Consult to Vascular Access Team  Consult performed by: Alonzo Thrasher RN  Consult ordered by: Selin Spence MD  Reason for consult: PIV insertion for CTA  Assessment/Recommendations: Ultrasound was used to find the vein which was compressible and without any ultrasound features of an artery or nerve bundle. Skin was cleaned and disinfected prior to IV puncture.  Under real-time ultrasound guidance peripheral access was obtained in the left basilic using 20 G 2.5\" B Hirsch Deep Access after 1 attempt(s). No immediate complications noted. Patient tolerated the procedure well.  Refer to IV flowsheet for further documentation.             
MRI brain reviewed, the stroke is chronic and was likely hemorrhagic.  There is no indication for antiplatelets from neurology perspective.  Continue home Keppra. We will sign off.    Jacky Orr, DO  Neurology    
MRI screening complete. RN called Yarely, patient's daughter to obtain information for screening.     Also, patient's daughter states she does not know when her mom took her medicines last. Patient does not remember when she last took them as well.   
Patient was passive in her responses but spoke about good support from her children. She said she was not [employer] and preferred not to be in the hospital. Her hope is to live with one of her children soon.  
TRANSFER - IN REPORT:    Verbal report received from Naveed GIL on Kerri Daniels  being received from 423 for ordered procedure      Report consisted of patient's Situation, Background, Assessment and   Recommendations(SBAR).     Information from the following report(s) Nurse Handoff Report was reviewed with the receiving nurse.    Opportunity for questions and clarification was provided.      Assessment completed upon patient's arrival to unit and care assumed.    
TRANSFER - IN REPORT:    Verbal report received from Pamela hernandez on eKrri Daniels being received from er  for routine progression of care.     Report consisted of patient’s Situation, Background, Assessment and Recommendations(SBAR).     Information from the following report(s) SBAR was reviewed. Opportunity for questions and clarification was provided.      Assessment completed upon patient’s arrival to unit and care assumed.     Patient received to room 423. Patient connected to monitor and assessment completed. Plan of care reviewed. Patient oriented to room and call light. Patient aware to use call light to communicate any chest pain or needs.       
TRANSFER - IN REPORT:    Verbal report received from amisha hernandez on Kerri Daniels being received from gi lab  for routine progression of care.     Report consisted of patient’s Situation, Background, Assessment and Recommendations(SBAR).     Information from the following report(s) Procedure Summary was reviewed. Opportunity for questions and clarification was provided.      Assessment completed upon patient’s arrival to unit and care assumed.     Patient received to room 423. Patient connected to monitor and assessment completed. Plan of care reviewed. Patient oriented to room and call light. Patient aware to use call light to communicate any chest pain or needs.         
TRANSFER - OUT REPORT:    Verbal report given to Cheyenne GIL on Kerri Daniels  being transferred to [facility] for routine progression of patient care       Report consisted of patient's Situation, Background, Assessment and   Recommendations(SBAR).     Information from the following report(s) Nurse Handoff Report was reviewed with the receiving nurse.           Lines:   Peripheral IV 10/10/24 Right Forearm (Active)   Site Assessment Clean, dry & intact 10/11/24 0700   Line Status Infusing 10/11/24 0700   Line Care Connections checked and tightened 10/11/24 0000   Phlebitis Assessment No symptoms 10/11/24 0000   Infiltration Assessment 0 10/11/24 0000   Alcohol Cap Used Yes 10/11/24 0000   Dressing Status Clean, dry & intact 10/11/24 0000   Dressing Type Transparent 10/11/24 0000   Dressing Intervention New 10/10/24 1550        Opportunity for questions and clarification was provided.             
US Guided PIV access-    Ultrasound was used to find the vein which was compressible and without any ultrasound features of an artery or nerve bundle. Skin was cleaned and disinfected prior to IV puncture.  Under real-time ultrasound guidance peripheral access was obtained in the right forearm using 22 G 1.75\" Peripheral IV catheter after 1 attempt(s). Blood return was present and IV flushed without difficulty with no clinical signs of infiltration. IV dressing applied and no immediate complications noted. Patient tolerated the procedure well.      
Plan:     Principal Problem:    Hypertensive urgency    Hypertensive emergency  Plan:   Patient is on Amlodipine 10 mg po q day.   BP has improved.   Patient is started on Valsartan 20 mg po q day. .   BP has improved further.   Monitor closely.        Thrombocytopenia, unspecified (HCC)  Plan:   Monitor.       Iron deficiency anemia due to chronic blood loss  Plan:   No indication for blood transfusion thus far.       Hepatic encephalopathy (HCC)    UGI bleed    Hx of esophageal varices    Melena  Plan:   Patient is on Lactulose.   Consciousness and orientation is back to normal.   Patient is alert and oriented to place, time and person.   Continue Thiamine, Folic acid.   EGD shows findings as above.       Tobacco abuse    Alcohol use disorder  Plan:   Patient is advised to stop alcohol and smoking.     I have discussed the plan of care with patient.      Anticipated Discharge Arrangements:   Pending rehab facility placement.     PT/OT evals ordered?  Therapy evals ordered  Diet:  ADULT DIET; Regular; 4 carb choices (60 gm/meal)  VTE prophylaxis: SCD's   Code status: Full Code      Non-peripheral Lines and Tubes (if present):              Telemetry (if present):  Cardiac/Telemetry Monitor On: Portable telemetry pack applied        Hospital Problems:  Principal Problem:    Hypertensive urgency  Active Problems:    Hypertensive emergency    Thrombocytopenia, unspecified (HCC)    Iron deficiency anemia due to chronic blood loss    Hepatic encephalopathy (HCC)    UGI bleed    Hx of esophageal varices    Tobacco abuse    Alcohol use disorder    Altered mental status    Melena  Resolved Problems:    * No resolved hospital problems. *      Objective:   Patient Vitals for the past 24 hrs:   Temp Pulse Resp BP SpO2   10/14/24 0834 -- -- -- -- 98 %   10/14/24 0830 98.2 °F (36.8 °C) 64 18 (!) 147/95 99 %   10/14/24 0432 99.5 °F (37.5 °C) 71 16 (!) 145/73 95 %   10/13/24 2322 99.1 °F (37.3 °C) 72 16 (!) 143/85 98 %   10/13/24 
with occasional cues for sequencing. Patient took a rest beak in between ambulation bouts. Steady progress towards PT goals. Will continue PT efforts.     SUBJECTIVE:   Ms. Daniels states, \"My daughter\"     Social/Functional Lives With: Alone  Type of Home: Apartment  Home Layout: One level  Home Access: Stairs to enter with rails  Entrance Stairs - Number of Steps: 2 steps  Entrance Stairs - Rails: Both  Bathroom Shower/Tub: Tub/Shower unit  Bathroom Toilet: Standard  Bathroom Equipment: None  Bathroom Accessibility: Accessible  Home Equipment: None  Receives Help From: Family  ADL Assistance: Independent  Homemaking Assistance: Independent  Homemaking Responsibilities: Yes  Ambulation Assistance: Independent  Transfer Assistance: Independent  Active : No  Occupation: Unemployed  Additional Comments: Lives alone, sleeps on couch, independent, no AD, no DME, family checks on her  OBJECTIVE:     PAIN: VITALS / O2: PRECAUTION / LINES / DRAINS:   Pre Treatment:   Pain Assessment: None - Denies Pain      Post Treatment: 0 Vitals        Oxygen    IV and Telemetry     RESTRICTIONS/PRECAUTIONS:  Restrictions/Precautions  Restrictions/Precautions: Seizure, Fall Risk  Restrictions/Precautions: Seizure, Fall Risk     MOBILITY:  I Mod I S SBA CGA Min Mod Max Total  NT x2 Comments:   Bed Mobility    Rolling [] [] [] [] [] [] [] [] [] [] []    Supine to Sit [] [] [] [x] [] [] [] [] [] [] [] HOB elevated and use of bed rails   Scooting [] [] [] [] [] [] [] [] [] [] []    Sit to Supine [] [] [] [] [] [] [] [] [] [] []    Transfers    Sit to Stand [] [] [] [] [x] [] [] [] [] [] []    Bed to Chair [] [] [] [] [x] [] [] [] [] [] []    Stand to Sit [] [] [] [] [x] [] [] [] [] [] []     [] [] [] [] [] [] [] [] [] [] []    I=Independent, Mod I=Modified Independent, S=Supervision, SBA=Standby Assistance, CGA=Contact Guard Assistance,   Min=Minimal Assistance, Mod=Moderate Assistance, Max=Maximal Assistance, Total=Total Assistance, 
(37.6 °C) 72 18 (!) 140/82 98 %   10/15/24 0006 99.7 °F (37.6 °C) 80 16 127/76 97 %   10/14/24 2030 99.1 °F (37.3 °C) 77 18 (!) 144/74 99 %   10/14/24 2017 -- -- 22 -- 98 %   10/14/24 1733 -- 82 -- (!) 165/91 --   10/14/24 1634 98.6 °F (37 °C) 60 16 (!) 181/88 100 %       Oxygen Therapy  SpO2: 97 %  Pulse via Oximetry: 48 beats per minute  Pulse Oximeter Device Mode: Intermittent  Pulse Oximeter Device Location: Finger  O2 Device: None (Room air)  FiO2 : 21 %  O2 Flow Rate (L/min): 0 L/min    Estimated body mass index is 23.11 kg/m² as calculated from the following:    Height as of this encounter: 1.651 m (5' 5\").    Weight as of this encounter: 63 kg (138 lb 14.4 oz).    Intake/Output Summary (Last 24 hours) at 10/15/2024 1516  Last data filed at 10/15/2024 1249  Gross per 24 hour   Intake 1139.31 ml   Output 0 ml   Net 1139.31 ml         Physical Exam:     General:    No acute distress  Head:  Normocephalic, atraumatic  Eyes:  Sclerae appear normal.  Pupils equally round.  ENT:  Nares appear normal.  Moist oral mucosa  Neck:  No restricted ROM.  Trachea midline   CV:   RRR.  No m/r/g.  No jugular venous distension.  Lungs:   CTAB.  No wheezing, rhonchi, or rales.  Symmetric expansion.  Abdomen:   Soft, nontender, nondistended.  Extremities: No cyanosis or clubbing.  No edema  Skin:     No rashes.  Normal coloration.   Warm and dry.    Neuro:  CN II-XII grossly intact.  Answering questions appropriately  Psych:  Normal mood and affect.      I have personally reviewed labs and tests:  Recent Labs:  Recent Results (from the past 48 hour(s))   Ammonia    Collection Time: 10/14/24  4:41 AM   Result Value Ref Range    Ammonia 71 (H) 11 - 51 umol/L       No results for input(s): \"COVID19\" in the last 72 hours.    Current Meds:  Current Facility-Administered Medications   Medication Dose Route Frequency    dextrose bolus 10% 250 mL  250 mL IntraVENous PRN    valsartan (DIOVAN) tablet 20 mg  20 mg Oral Daily    folic acid 
65 18 122/75 --   10/15/24 2100 -- 67 -- 122/75 --   10/15/24 1955 -- 70 18 -- 96 %   10/15/24 1829 -- -- -- (!) 142/86 --   10/15/24 1659 -- 74 -- -- --   10/15/24 1625 99.9 °F (37.7 °C) 73 18 (!) 141/84 98 %       Oxygen Therapy  SpO2: 99 %  Pulse via Oximetry: 48 beats per minute  Pulse Oximeter Device Mode: Intermittent  Pulse Oximeter Device Location: Finger  O2 Device: None (Room air)  FiO2 : 21 %  O2 Flow Rate (L/min): 0 L/min    Estimated body mass index is 23.45 kg/m² as calculated from the following:    Height as of this encounter: 1.651 m (5' 5\").    Weight as of this encounter: 63.9 kg (140 lb 14.4 oz).    Intake/Output Summary (Last 24 hours) at 10/16/2024 1349  Last data filed at 10/15/2024 1659  Gross per 24 hour   Intake 600 ml   Output 0 ml   Net 600 ml         Physical Exam:     General:    No acute distress  Head:  Normocephalic, atraumatic  Eyes:  Sclerae appear normal.  Pupils equally round.  ENT:  Nares appear normal.  Moist oral mucosa  Neck:  No restricted ROM.  Trachea midline   CV:   RRR.  No m/r/g.  No jugular venous distension.  Lungs:   CTAB.  No wheezing, rhonchi, or rales.  Symmetric expansion.  Abdomen:   Mildly distended  Extremities: No cyanosis or clubbing.  No edema  Skin:     No rashes.  Normal coloration.   Warm and dry.    Neuro:  CN II-XII grossly intact.  Answering questions appropriately  Psych:  Normal mood and affect.      I have personally reviewed labs and tests:  Recent Labs:  No results found for this or any previous visit (from the past 48 hour(s)).      No results for input(s): \"COVID19\" in the last 72 hours.    Current Meds:  Current Facility-Administered Medications   Medication Dose Route Frequency    pantoprazole (PROTONIX) tablet 40 mg  40 mg Oral BID AC    carvedilol (COREG) tablet 6.25 mg  6.25 mg Oral BID WC    thiamine tablet 100 mg  100 mg Oral Daily    dextrose bolus 10% 250 mL  250 mL IntraVENous PRN    valsartan (DIOVAN) tablet 20 mg  20 mg Oral Daily    
Charge)  The initial evaluation charge encompasses clinical chart review, objective assessment, interpretation of assessment, and skilled monitoring of the patient's response to treatment in order to develop a plan of care.     TREATMENT:   Co-Treatment PT/OT necessary due to patient's decreased overall endurance/tolerance levels, as well as need for high level skilled assistance to complete functional transfers/mobility and functional tasks  Therapeutic Activity (15 Minutes): Therapeutic activity included Supine to Sit, Sit to Supine, Transfer Training, Ambulation on level ground, and Sitting balance  to improve functional Activity tolerance, Balance, Coordination, Mobility, and Strength.    TREATMENT GRID:  N/A    AFTER TREATMENT PRECAUTIONS: Alarm Activated, Bed, Bed/Chair Locked, Call light within reach, Needs within reach, and Side rails x3    INTERDISCIPLINARY COLLABORATION:  RN/ PCT and OT/ CHAMBERLAIN    EDUCATION: Education Given To: Patient  Education Provided: Role of Therapy;Plan of Care;Precautions;Transfer Training;Fall Prevention Strategies  Education Method: Demonstration;Verbal  Barriers to Learning: Cognition  Education Outcome: Continued education needed    TIME IN/OUT:  Time In: 0936  Time Out: 0955  Minutes: 19    MADDIE CASTILLO PT   
Oral Q1H PRN    Or    LORazepam (ATIVAN) 1 mg in sodium chloride (PF) 0.9 % 10 mL injection  1 mg IntraVENous Q1H PRN    Or    LORazepam (ATIVAN) tablet 2 mg  2 mg Oral Q1H PRN    Or    LORazepam (ATIVAN) 2 mg in sodium chloride (PF) 0.9 % 10 mL injection  2 mg IntraVENous Q1H PRN    Or    LORazepam (ATIVAN) tablet 3 mg  3 mg Oral Q1H PRN    Or    LORazepam (ATIVAN) 3 mg in sodium chloride (PF) 0.9 % 10 mL injection  3 mg IntraVENous Q1H PRN    Or    LORazepam (ATIVAN) tablet 4 mg  4 mg Oral Q1H PRN    Or    LORazepam (ATIVAN) 4 mg in sodium chloride (PF) 0.9 % 10 mL injection  4 mg IntraVENous Q1H PRN     Current Outpatient Medications   Medication Sig    lactulose (CHRONULAC) 10 GM/15ML solution Take 10 g by mouth daily.    nicotine polacrilex (NICORETTE) 2 MG gum Take 2 mg by mouth as needed for Smoking cessation. Take 1 piece every 2 hours as needed    fluticasone-umeclidin-vilant (TRELEGY ELLIPTA) 100-62.5-25 MCG/INH AEPB Inhale 1 puff into the lungs daily    levETIRAcetam (KEPPRA) 750 MG tablet Take 1 tablet by mouth 2 times daily    hydrALAZINE (APRESOLINE) 50 MG tablet Take 1 tablet by mouth every 8 hours    losartan (COZAAR) 100 MG tablet Take 1 tablet by mouth daily    amLODIPine (NORVASC) 10 MG tablet Take 1 tablet by mouth daily    pantoprazole (PROTONIX) 40 MG tablet Take 1 tablet by mouth 2 times daily (before meals)    spironolactone (ALDACTONE) 50 MG tablet Take 50 mg by mouth daily (Patient not taking: Reported on 3/13/2023)    cholestyramine light (PREVALITE) 4 GM/DOSE powder Take by mouth 2 times daily (with meals) (Patient not taking: Reported on 3/13/2023)    ergocalciferol (ERGOCALCIFEROL) 1.25 MG (57618 UT) capsule Take 50,000 Units by mouth (Patient not taking: Reported on 3/13/2023)    hydrOXYzine (ATARAX) 25 MG tablet Take 25 mg by mouth every 4 hours as needed (Patient not taking: Reported on 3/13/2023)    magnesium oxide (MAG-OX) 400 MG tablet Take 400 mg by mouth 2 times daily (Patient

## 2024-10-16 NOTE — PLAN OF CARE
Problem: Pain  Goal: Verbalizes/displays adequate comfort level or baseline comfort level  Outcome: Progressing  Flowsheets  Taken 10/15/2024 2124 by Marcus Toledo RN  Verbalizes/displays adequate comfort level or baseline comfort level:   Assess pain using appropriate pain scale   Implement non-pharmacological measures as appropriate and evaluate response   Notify Licensed Independent Practitioner if interventions unsuccessful or patient reports new pain  Taken 10/15/2024 1000 by Jozef Vang RN  Verbalizes/displays adequate comfort level or baseline comfort level: Encourage patient to monitor pain and request assistance     Problem: Respiratory - Adult  Goal: Achieves optimal ventilation and oxygenation  Recent Flowsheet Documentation  Taken 10/15/2024 1000 by Jozef Vang RN  Achieves optimal ventilation and oxygenation: Assess for changes in respiratory status  10/15/2024 0907 by Diamond Ceja RCP  Outcome: Progressing  Flowsheets (Taken 10/15/2024 0907)  Achieves optimal ventilation and oxygenation:   Assess for changes in mentation and behavior   Assess for changes in respiratory status   Oxygen supplementation based on oxygen saturation or arterial blood gases   Position to facilitate oxygenation and minimize respiratory effort   Assess and instruct to report shortness of breath or any respiratory difficulty   Respiratory therapy support as indicated   Encourage broncho-pulmonary hygiene including cough, deep breathe, incentive spirometry

## 2024-10-16 NOTE — DISCHARGE SUMMARY
Intermittent  Pulse Oximeter Device Location: Finger  O2 Device: None (Room air)  FiO2 : 21 %  O2 Flow Rate (L/min): 0 L/min    Estimated body mass index is 23.45 kg/m² as calculated from the following:    Height as of this encounter: 1.651 m (5' 5\").    Weight as of this encounter: 63.9 kg (140 lb 14.4 oz).    Intake/Output Summary (Last 24 hours) at 10/16/2024 1526  Last data filed at 10/15/2024 1659  Gross per 24 hour   Intake 600 ml   Output 0 ml   Net 600 ml         Physical Exam:    General:          No acute distress  Head:               Normocephalic, atraumatic  Eyes:               Sclerae appear normal.  Pupils equally round.  ENT:                Nares appear normal.  Moist oral mucosa  Neck:               No restricted ROM.  Trachea midline   CV:                  RRR.  No m/r/g.  No jugular venous distension.  Lungs:             CTAB.  No wheezing, rhonchi, or rales.  Symmetric expansion.  Abdomen:        Mildly distended  Extremities:     No cyanosis or clubbing.  No edema  Skin:                No rashes.  Normal coloration.   Warm and dry.    Neuro:             CN II-XII grossly intact.  Answering questions appropriately  Psych:             Normal mood and affect.       Signed:  Beto Iqbal MD    Part of this note may have been written by using a voice dictation software.  The note has been proof read but may still contain some grammatical/other typographical errors.

## 2024-10-16 NOTE — CARE COORDINATION
10/16/24 1542   Discharge Planning   Type of Residence Apartment  (Daughter, Edi [address] [place] [address])   Living Arrangements Family Members   Current Services Prior To Admission None   Potential Assistance Needed Home Care;Durable Medical Equipment   Potential DME Needed [facility]   DME Ordered? Walker   Potential Assistance Purchasing Medications No   Type of Home Care Services OT;PT;Nursing Services   Patient expects to be discharged to: Apartment   Services At/After Discharge   Transition of Care Consult (CM Consult) Home Health   Internal Home Health Yes   Services At/After Discharge PT;OT;Nursing services   [place] Resource Information Provided? No   Mode of Transport at Discharge Other (see comment)  (Son to transport by car)   Confirm Follow Up Transport Family   Condition of Participation: Discharge Planning   The Plan for Transition of Care is related to the following treatment goals: Home with home health therapy to assist and support return to baseline function.   The Patient and/or Patient Representative was provided with a Choice of Provider? Patient   The Patient and/Or Patient Representative agree with the Discharge Plan? Yes   Freedom of Choice list was provided with basic dialogue that supports the patient's individualized plan of care/goals, treatment preferences, and shares the quality data associated with the providers?  Yes     Son at bedside awaiting discharge instructions.

## 2024-10-16 NOTE — PLAN OF CARE
Problem: Respiratory - Adult  Goal: Achieves optimal ventilation and oxygenation  Outcome: Progressing  Flowsheets (Taken 10/16/2024 4059)  Achieves optimal ventilation and oxygenation:   Assess for changes in respiratory status   Position to facilitate oxygenation and minimize respiratory effort   Respiratory therapy support as indicated   Assess for changes in mentation and behavior   Oxygen supplementation based on oxygen saturation or arterial blood gases   Encourage broncho-pulmonary hygiene including cough, deep breathe, incentive spirometry   Assess and instruct to report shortness of breath or any respiratory difficulty

## 2025-08-05 ENCOUNTER — APPOINTMENT (OUTPATIENT)
Dept: GENERAL RADIOLOGY | Age: 65
End: 2025-08-05
Payer: MEDICAID

## 2025-08-05 ENCOUNTER — HOSPITAL ENCOUNTER (EMERGENCY)
Age: 65
Discharge: HOME | End: 2025-08-06
Payer: MEDICAID

## 2025-08-05 DIAGNOSIS — I10 ASYMPTOMATIC HYPERTENSION: ICD-10-CM

## 2025-08-05 DIAGNOSIS — M54.50 ACUTE LEFT-SIDED LOW BACK PAIN WITHOUT SCIATICA: Primary | ICD-10-CM

## 2025-08-05 PROCEDURE — 71046 X-RAY EXAM CHEST 2 VIEWS: CPT

## 2025-08-05 PROCEDURE — 80053 COMPREHEN METABOLIC PANEL: CPT

## 2025-08-05 PROCEDURE — 99285 EMERGENCY DEPT VISIT HI MDM: CPT

## 2025-08-05 PROCEDURE — 93005 ELECTROCARDIOGRAM TRACING: CPT

## 2025-08-05 PROCEDURE — 85025 COMPLETE CBC W/AUTO DIFF WBC: CPT

## 2025-08-05 PROCEDURE — 84484 ASSAY OF TROPONIN QUANT: CPT

## 2025-08-05 ASSESSMENT — PAIN SCALES - GENERAL: PAINLEVEL_OUTOF10: 8

## 2025-08-05 ASSESSMENT — LIFESTYLE VARIABLES
HOW MANY STANDARD DRINKS CONTAINING ALCOHOL DO YOU HAVE ON A TYPICAL DAY: 1 OR 2
HOW OFTEN DO YOU HAVE A DRINK CONTAINING ALCOHOL: MONTHLY OR LESS

## 2025-08-05 ASSESSMENT — PAIN - FUNCTIONAL ASSESSMENT: PAIN_FUNCTIONAL_ASSESSMENT: 0-10

## 2025-08-05 ASSESSMENT — PAIN DESCRIPTION - LOCATION: LOCATION: BACK

## 2025-08-06 VITALS
SYSTOLIC BLOOD PRESSURE: 191 MMHG | BODY MASS INDEX: 29.32 KG/M2 | TEMPERATURE: 98.2 F | OXYGEN SATURATION: 94 % | HEART RATE: 54 BPM | HEIGHT: 65 IN | WEIGHT: 176 LBS | RESPIRATION RATE: 16 BRPM | DIASTOLIC BLOOD PRESSURE: 101 MMHG

## 2025-08-06 LAB
ALBUMIN SERPL-MCNC: 2.6 G/DL (ref 3.2–4.6)
ALBUMIN/GLOB SERPL: 0.5 (ref 1–1.9)
ALP SERPL-CCNC: 159 U/L (ref 35–104)
ALT SERPL-CCNC: 12 U/L (ref 8–45)
ANION GAP SERPL CALC-SCNC: 11 MMOL/L (ref 7–16)
AST SERPL-CCNC: 36 U/L (ref 15–37)
BASOPHILS # BLD: 0.02 K/UL (ref 0–0.2)
BASOPHILS NFR BLD: 0.6 % (ref 0–2)
BILIRUB SERPL-MCNC: 0.9 MG/DL (ref 0–1.2)
BUN SERPL-MCNC: 19 MG/DL (ref 8–23)
CALCIUM SERPL-MCNC: 9.3 MG/DL (ref 8.8–10.2)
CHLORIDE SERPL-SCNC: 109 MMOL/L (ref 98–107)
CO2 SERPL-SCNC: 17 MMOL/L (ref 20–29)
CREAT SERPL-MCNC: 1.3 MG/DL (ref 0.6–1.1)
DIFFERENTIAL METHOD BLD: ABNORMAL
EKG ATRIAL RATE: 70 BPM
EKG DIAGNOSIS: NORMAL
EKG P AXIS: 10 DEGREES
EKG P-R INTERVAL: 170 MS
EKG Q-T INTERVAL: 455 MS
EKG QRS DURATION: 89 MS
EKG QTC CALCULATION (BAZETT): 491 MS
EKG R AXIS: -24 DEGREES
EKG T AXIS: 11 DEGREES
EKG VENTRICULAR RATE: 70 BPM
EOSINOPHIL # BLD: 0.09 K/UL (ref 0–0.8)
EOSINOPHIL NFR BLD: 2.5 % (ref 0.5–7.8)
ERYTHROCYTE [DISTWIDTH] IN BLOOD BY AUTOMATED COUNT: 16.5 % (ref 11.9–14.6)
GLOBULIN SER CALC-MCNC: 4.9 G/DL (ref 2.3–3.5)
GLUCOSE SERPL-MCNC: 101 MG/DL (ref 70–99)
HCT VFR BLD AUTO: 30.1 % (ref 35.8–46.3)
HGB BLD-MCNC: 9.9 G/DL (ref 11.7–15.4)
IMM GRANULOCYTES # BLD AUTO: 0.02 K/UL (ref 0–0.5)
IMM GRANULOCYTES NFR BLD AUTO: 0.6 % (ref 0–5)
LYMPHOCYTES # BLD: 0.78 K/UL (ref 0.5–4.6)
LYMPHOCYTES NFR BLD: 21.6 % (ref 13–44)
MCH RBC QN AUTO: 29.7 PG (ref 26.1–32.9)
MCHC RBC AUTO-ENTMCNC: 32.9 G/DL (ref 31.4–35)
MCV RBC AUTO: 90.4 FL (ref 82–102)
MONOCYTES # BLD: 0.35 K/UL (ref 0.1–1.3)
MONOCYTES NFR BLD: 9.7 % (ref 4–12)
NEUTS SEG # BLD: 2.35 K/UL (ref 1.7–8.2)
NEUTS SEG NFR BLD: 65 % (ref 43–78)
NRBC # BLD: 0 K/UL (ref 0–0.2)
PLATELET # BLD AUTO: 92 K/UL (ref 150–450)
PMV BLD AUTO: 10.9 FL (ref 9.4–12.3)
POTASSIUM SERPL-SCNC: 4.3 MMOL/L (ref 3.5–5.1)
PROT SERPL-MCNC: 7.6 G/DL (ref 6.3–8.2)
RBC # BLD AUTO: 3.33 M/UL (ref 4.05–5.2)
SODIUM SERPL-SCNC: 137 MMOL/L (ref 136–145)
TROPONIN T SERPL HS-MCNC: 11.2 NG/L (ref 0–14)
TROPONIN T SERPL HS-MCNC: 15.5 NG/L (ref 0–14)
WBC # BLD AUTO: 3.6 K/UL (ref 4.3–11.1)

## 2025-08-06 PROCEDURE — 84484 ASSAY OF TROPONIN QUANT: CPT

## 2025-08-06 PROCEDURE — 6370000000 HC RX 637 (ALT 250 FOR IP)

## 2025-08-06 PROCEDURE — 93010 ELECTROCARDIOGRAM REPORT: CPT | Performed by: INTERNAL MEDICINE

## 2025-08-06 RX ORDER — AMLODIPINE BESYLATE 10 MG/1
10 TABLET ORAL DAILY
Qty: 30 TABLET | Refills: 0 | Status: SHIPPED | OUTPATIENT
Start: 2025-08-06 | End: 2025-09-05

## 2025-08-06 RX ORDER — CARVEDILOL 6.25 MG/1
6.25 TABLET ORAL
Status: COMPLETED | OUTPATIENT
Start: 2025-08-06 | End: 2025-08-06

## 2025-08-06 RX ADMIN — CARVEDILOL 6.25 MG: 6.25 TABLET, FILM COATED ORAL at 00:41

## (undated) DEVICE — SOLUTION IRRIG 1000ML 0.9% SOD CHL USP POUR PLAS BTL

## (undated) DEVICE — CONNECTOR TBNG OD5-7MM O2 END DISP

## (undated) DEVICE — GAUZE,SPONGE,4"X4",12PLY,WOVEN,NS,LF: Brand: MEDLINE

## (undated) DEVICE — CANNULA NSL ORAL AD FOR CAPNOFLEX CO2 O2 AIRLFE

## (undated) DEVICE — KENDALL RADIOLUCENT FOAM MONITORING ELECTRODE RECTANGULAR SHAPE: Brand: KENDALL

## (undated) DEVICE — AIRLIFE™ OXYGEN TUBING 7 FEET (2.1 M) CRUSH RESISTANT OXYGEN TUBING, VINYL TIPPED: Brand: AIRLIFE™

## (undated) DEVICE — AIRSEAL 8 MM CANNULA CAP AND OBTURATOR WITH BLADELESS OPTICAL TIP COMPATIBLE WITH INTUITIVE DA VINCI XI AND DA VINCI X 8 MM INSTRUMENT CANNULA, STANDARD LENGTH: Brand: AIRSEAL

## (undated) DEVICE — ENDOSCOPIC KIT 1.1+ OP4 CA DE 2 GWN AAMI LEVEL 3

## (undated) DEVICE — SUTURE MCRYL SZ 3-0 L18IN ABSRB UD L19MM PS-2 3/8 CIR PRIM Y497G

## (undated) DEVICE — GLOVE SURG SZ 7 L12IN FNGR THK79MIL GRN LTX FREE

## (undated) DEVICE — BLOCK BITE AD 60FR W/ VELC STRP ADDRESSES MOST PT AND

## (undated) DEVICE — MASTISOL ADHESIVE LIQ 2/3ML

## (undated) DEVICE — CANNULA SEAL

## (undated) DEVICE — SOLUTION ANTIFOG VIS SYS CLEARIFY LAPSCP

## (undated) DEVICE — SYRINGE MED 10ML LUERLOCK TIP W/O SFTY DISP

## (undated) DEVICE — AIRSEAL BIFURCATED FILTERED TUBESET WITH ACTIVATED CHARCOAL FILTER: Brand: AIRSEAL

## (undated) DEVICE — GENERAL LAPAROSCOPY: Brand: MEDLINE INDUSTRIES, INC.

## (undated) DEVICE — ELECTRO LUBE IS A SINGLE PATIENT USE DEVICE THAT IS INTENDED TO BE USED ON ELECTROSURGICAL ELECTRODES TO REDUCE STICKING.: Brand: KEY SURGICAL ELECTRO LUBE

## (undated) DEVICE — MOUTHPIECE ENDOSCP L CTRL OPN AND SIDE PORTS DISP

## (undated) DEVICE — GLOVE ORANGE PI 7   MSG9070

## (undated) DEVICE — SYRINGE MED 3ML CLR PLAS STD N CTRL LUERLOCK TIP DISP

## (undated) DEVICE — NEEDLE SYR 18GA L1.5IN RED PLAS HUB S STL BLNT FILL W/O

## (undated) DEVICE — LUBE JELLY FOIL PACK 1.4 OZ: Brand: MEDLINE INDUSTRIES, INC.

## (undated) DEVICE — YANKAUER,BULB TIP,W/O VENT,RIGID,STERILE: Brand: MEDLINE

## (undated) DEVICE — [HIGH FLOW INSUFFLATOR,  DO NOT USE IF PACKAGE IS DAMAGED,  KEEP DRY,  KEEP AWAY FROM SUNLIGHT,  PROTECT FROM HEAT AND RADIOACTIVE SOURCES.]: Brand: PNEUMOSURE

## (undated) DEVICE — SINGLE PORT MANIFOLD: Brand: NEPTUNE 2

## (undated) DEVICE — COLUMN DRAPE

## (undated) DEVICE — PAD PT POS 36 IN SURGYPAD DISP

## (undated) DEVICE — STRIP,CLOSURE,WOUND,MEDI-STRIP,1/2X4: Brand: MEDLINE

## (undated) DEVICE — DRAPE,TOP,102X53,STERILE: Brand: MEDLINE

## (undated) DEVICE — CONTAINER FORMALIN PREFILLED 10% NBF 60ML

## (undated) DEVICE — BLADELESS OBTURATOR: Brand: WECK VISTA

## (undated) DEVICE — SUTURE ABSORBABLE ANTIBACT 2-0 CT-2 9 IN STRATAFIX PDS + SXPP1A422

## (undated) DEVICE — ARM DRAPE

## (undated) DEVICE — TROCAR: Brand: KII FIOS FIRST ENTRY

## (undated) DEVICE — SUTURE ABSRB L12IN L12MM SZ 2-0 GS-22 VLT GLYCOLIDE VLOCM2115

## (undated) DEVICE — SYRINGE, LUER SLIP, STERILE, 60ML: Brand: MEDLINE

## (undated) DEVICE — FORCEPS BX L240CM JAW DIA2.8MM L CAP W/ NDL MIC MESH TOOTH